# Patient Record
Sex: MALE | Race: WHITE | NOT HISPANIC OR LATINO | Employment: FULL TIME | URBAN - METROPOLITAN AREA
[De-identification: names, ages, dates, MRNs, and addresses within clinical notes are randomized per-mention and may not be internally consistent; named-entity substitution may affect disease eponyms.]

---

## 2023-05-30 LAB
ALANINE AMINOTRANSFERASE (SGPT) (U/L) IN SER/PLAS: 13 U/L (ref 10–52)
ALBUMIN (G/DL) IN SER/PLAS: 4.4 G/DL (ref 3.4–5)
ALKALINE PHOSPHATASE (U/L) IN SER/PLAS: 62 U/L (ref 33–136)
ANION GAP IN SER/PLAS: 12 MMOL/L (ref 10–20)
ASPARTATE AMINOTRANSFERASE (SGOT) (U/L) IN SER/PLAS: 11 U/L (ref 9–39)
BILIRUBIN TOTAL (MG/DL) IN SER/PLAS: 0.7 MG/DL (ref 0–1.2)
CALCIUM (MG/DL) IN SER/PLAS: 9.7 MG/DL (ref 8.6–10.6)
CARBON DIOXIDE, TOTAL (MMOL/L) IN SER/PLAS: 25 MMOL/L (ref 21–32)
CHLORIDE (MMOL/L) IN SER/PLAS: 105 MMOL/L (ref 98–107)
CREATININE (MG/DL) IN SER/PLAS: 0.96 MG/DL (ref 0.5–1.3)
ESTIMATED AVERAGE GLUCOSE FOR HBA1C: 111 MG/DL
GFR MALE: 87 ML/MIN/1.73M2
GLUCOSE (MG/DL) IN SER/PLAS: 83 MG/DL (ref 74–99)
HEMOGLOBIN A1C/HEMOGLOBIN TOTAL IN BLOOD: 5.5 %
POTASSIUM (MMOL/L) IN SER/PLAS: 4.5 MMOL/L (ref 3.5–5.3)
PROTEIN TOTAL: 6.1 G/DL (ref 6.4–8.2)
SODIUM (MMOL/L) IN SER/PLAS: 137 MMOL/L (ref 136–145)
UREA NITROGEN (MG/DL) IN SER/PLAS: 20 MG/DL (ref 6–23)

## 2023-07-25 LAB — PROSTATE SPECIFIC AG (NG/ML) IN SER/PLAS: 0.45 NG/ML (ref 0–4)

## 2023-09-12 ENCOUNTER — HOSPITAL ENCOUNTER (OUTPATIENT)
Dept: DATA CONVERSION | Facility: HOSPITAL | Age: 68
End: 2023-09-12

## 2023-09-12 DIAGNOSIS — M17.12 UNILATERAL PRIMARY OSTEOARTHRITIS, LEFT KNEE: ICD-10-CM

## 2023-10-04 ENCOUNTER — LAB (OUTPATIENT)
Dept: LAB | Facility: LAB | Age: 68
End: 2023-10-04
Payer: MEDICARE

## 2023-10-04 ENCOUNTER — HOSPITAL ENCOUNTER (OUTPATIENT)
Dept: RADIOLOGY | Facility: HOSPITAL | Age: 68
Discharge: HOME | End: 2023-10-04
Payer: MEDICARE

## 2023-10-04 ENCOUNTER — PRE-ADMISSION TESTING (OUTPATIENT)
Dept: PREADMISSION TESTING | Facility: HOSPITAL | Age: 68
End: 2023-10-04
Payer: MEDICARE

## 2023-10-04 ENCOUNTER — DOCUMENTATION (OUTPATIENT)
Dept: ORTHOPEDICS | Facility: HOSPITAL | Age: 68
End: 2023-10-04

## 2023-10-04 VITALS
TEMPERATURE: 97.4 F | OXYGEN SATURATION: 98 % | RESPIRATION RATE: 16 BRPM | SYSTOLIC BLOOD PRESSURE: 126 MMHG | WEIGHT: 189.49 LBS | DIASTOLIC BLOOD PRESSURE: 75 MMHG | HEART RATE: 72 BPM | HEIGHT: 71 IN | BODY MASS INDEX: 26.53 KG/M2

## 2023-10-04 DIAGNOSIS — Z01.818 PREOPERATIVE TESTING: ICD-10-CM

## 2023-10-04 DIAGNOSIS — Z01.818 PREOPERATIVE TESTING: Primary | ICD-10-CM

## 2023-10-04 PROBLEM — R23.9 SKIN CHANGE: Status: ACTIVE | Noted: 2023-10-04

## 2023-10-04 PROBLEM — B02.9 SHINGLES: Status: ACTIVE | Noted: 2023-10-04

## 2023-10-04 PROBLEM — M84.452A: Status: ACTIVE | Noted: 2023-10-04

## 2023-10-04 PROBLEM — Q66.72 CAVUS DEFORMITY OF LEFT FOOT: Status: ACTIVE | Noted: 2023-10-04

## 2023-10-04 PROBLEM — H25.11 AGE-RELATED NUCLEAR CATARACT OF RIGHT EYE: Status: ACTIVE | Noted: 2023-10-04

## 2023-10-04 PROBLEM — M67.952 TENDINOPATHY OF LEFT GLUTEUS MEDIUS: Status: ACTIVE | Noted: 2023-10-04

## 2023-10-04 PROBLEM — N52.9 MALE ERECTILE DISORDER: Status: ACTIVE | Noted: 2023-10-04

## 2023-10-04 PROBLEM — M67.02 ACQUIRED SHORT ACHILLES TENDON OF LEFT LOWER EXTREMITY: Status: ACTIVE | Noted: 2023-10-04

## 2023-10-04 PROBLEM — R71.8 ELEVATED MCV: Status: ACTIVE | Noted: 2023-10-04

## 2023-10-04 PROBLEM — F43.20 ADULT SITUATIONAL STRESS DISORDER: Status: ACTIVE | Noted: 2023-10-04

## 2023-10-04 PROBLEM — M67.951 TENDINOPATHY OF RIGHT GLUTEUS MEDIUS: Status: ACTIVE | Noted: 2023-10-04

## 2023-10-04 PROBLEM — M17.12 PRIMARY LOCALIZED OSTEOARTHRITIS OF LEFT KNEE: Status: ACTIVE | Noted: 2023-10-04

## 2023-10-04 PROBLEM — R79.9 ABNORMAL BLOOD CHEMISTRY: Status: ACTIVE | Noted: 2023-10-04

## 2023-10-04 PROBLEM — H52.03 HYPEROPIA OF BOTH EYES: Status: ACTIVE | Noted: 2023-10-04

## 2023-10-04 PROBLEM — E55.9 VITAMIN D DEFICIENCY: Status: ACTIVE | Noted: 2023-10-04

## 2023-10-04 PROBLEM — M25.851 FEMOROACETABULAR IMPINGEMENT OF BOTH HIPS: Status: ACTIVE | Noted: 2023-10-04

## 2023-10-04 PROBLEM — S83.249A MEDIAL MENISCUS TEAR: Status: ACTIVE | Noted: 2023-10-04

## 2023-10-04 PROBLEM — N40.1 BPH WITH OBSTRUCTION/LOWER URINARY TRACT SYMPTOMS: Status: ACTIVE | Noted: 2023-10-04

## 2023-10-04 PROBLEM — E66.3 OVERWEIGHT WITH BODY MASS INDEX (BMI) OF 28 TO 28.9 IN ADULT: Status: ACTIVE | Noted: 2023-10-04

## 2023-10-04 PROBLEM — N40.0 BPH (BENIGN PROSTATIC HYPERPLASIA): Status: ACTIVE | Noted: 2023-10-04

## 2023-10-04 PROBLEM — G47.9 SLEEP DISTURBANCE: Status: ACTIVE | Noted: 2023-10-04

## 2023-10-04 PROBLEM — H52.03 HYPEROPIA WITH PRESBYOPIA OF BOTH EYES: Status: ACTIVE | Noted: 2023-10-04

## 2023-10-04 PROBLEM — N13.8 BPH WITH OBSTRUCTION/LOWER URINARY TRACT SYMPTOMS: Status: ACTIVE | Noted: 2023-10-04

## 2023-10-04 PROBLEM — R35.1 NOCTURIA: Status: ACTIVE | Noted: 2023-10-04

## 2023-10-04 PROBLEM — H25.12 AGE-RELATED NUCLEAR CATARACT OF LEFT EYE: Status: ACTIVE | Noted: 2023-10-04

## 2023-10-04 PROBLEM — M25.852 FEMOROACETABULAR IMPINGEMENT OF BOTH HIPS: Status: ACTIVE | Noted: 2023-10-04

## 2023-10-04 PROBLEM — R03.0 BLOOD PRESSURE ELEVATED WITHOUT HISTORY OF HTN: Status: ACTIVE | Noted: 2023-10-04

## 2023-10-04 PROBLEM — L30.8 HERPES ZOSTER DERMATITIS: Status: ACTIVE | Noted: 2023-10-04

## 2023-10-04 PROBLEM — R82.90 URINE ABNORMALITY: Status: ACTIVE | Noted: 2023-10-04

## 2023-10-04 PROBLEM — R79.89 LOW TESTOSTERONE: Status: ACTIVE | Noted: 2023-10-04

## 2023-10-04 PROBLEM — H52.4 HYPEROPIA WITH PRESBYOPIA OF BOTH EYES: Status: ACTIVE | Noted: 2023-10-04

## 2023-10-04 PROBLEM — E78.00 ELEVATED LDL CHOLESTEROL LEVEL: Status: ACTIVE | Noted: 2023-10-04

## 2023-10-04 PROBLEM — G47.33 OBSTRUCTIVE SLEEP APNEA SYNDROME: Status: ACTIVE | Noted: 2023-10-04

## 2023-10-04 PROBLEM — B02.8 HERPES ZOSTER DERMATITIS: Status: ACTIVE | Noted: 2023-10-04

## 2023-10-04 PROBLEM — H53.8 BLURRED VISION: Status: ACTIVE | Noted: 2023-10-04

## 2023-10-04 LAB
ANION GAP SERPL CALC-SCNC: 12 MMOL/L
BASOPHILS # BLD AUTO: 0.02 X10*3/UL (ref 0–0.1)
BASOPHILS NFR BLD AUTO: 0.3 %
BUN SERPL-MCNC: 16 MG/DL (ref 8–25)
CALCIUM SERPL-MCNC: 9.3 MG/DL (ref 8.5–10.4)
CHLORIDE SERPL-SCNC: 106 MMOL/L (ref 97–107)
CO2 SERPL-SCNC: 26 MMOL/L (ref 24–31)
CREAT SERPL-MCNC: 0.8 MG/DL (ref 0.4–1.6)
EOSINOPHIL # BLD AUTO: 0.33 X10*3/UL (ref 0–0.7)
EOSINOPHIL NFR BLD AUTO: 5.3 %
ERYTHROCYTE [DISTWIDTH] IN BLOOD BY AUTOMATED COUNT: 13.2 % (ref 11.5–14.5)
GFR SERPL CREATININE-BSD FRML MDRD: >90 ML/MIN/1.73M*2
GLUCOSE SERPL-MCNC: 103 MG/DL (ref 65–99)
HCT VFR BLD AUTO: 41.5 % (ref 41–52)
HGB BLD-MCNC: 13.6 G/DL (ref 13.5–17.5)
IMM GRANULOCYTES # BLD AUTO: 0.01 X10*3/UL (ref 0–0.7)
IMM GRANULOCYTES NFR BLD AUTO: 0.2 % (ref 0–0.9)
LYMPHOCYTES # BLD AUTO: 1.62 X10*3/UL (ref 1.2–4.8)
LYMPHOCYTES NFR BLD AUTO: 25.9 %
MCH RBC QN AUTO: 31 PG (ref 26–34)
MCHC RBC AUTO-ENTMCNC: 32.8 G/DL (ref 32–36)
MCV RBC AUTO: 95 FL (ref 80–100)
MONOCYTES # BLD AUTO: 0.46 X10*3/UL (ref 0.1–1)
MONOCYTES NFR BLD AUTO: 7.4 %
NEUTROPHILS # BLD AUTO: 3.81 X10*3/UL (ref 1.2–7.7)
NEUTROPHILS NFR BLD AUTO: 60.9 %
NRBC BLD-RTO: ABNORMAL /100{WBCS}
PLATELET # BLD AUTO: 176 X10*3/UL (ref 150–450)
PMV BLD AUTO: 10.6 FL (ref 7.5–11.5)
POTASSIUM SERPL-SCNC: 4.5 MMOL/L (ref 3.4–5.1)
RBC # BLD AUTO: 4.39 X10*6/UL (ref 4.5–5.9)
SODIUM SERPL-SCNC: 144 MMOL/L (ref 133–145)
WBC # BLD AUTO: 6.3 X10*3/UL (ref 4.4–11.3)

## 2023-10-04 PROCEDURE — 73562 X-RAY EXAM OF KNEE 3: CPT | Mod: LT

## 2023-10-04 PROCEDURE — 93005 ELECTROCARDIOGRAM TRACING: CPT

## 2023-10-04 PROCEDURE — 87081 CULTURE SCREEN ONLY: CPT

## 2023-10-04 PROCEDURE — 77073 BONE LENGTH STUDIES: CPT | Mod: FY

## 2023-10-04 PROCEDURE — 80048 BASIC METABOLIC PNL TOTAL CA: CPT | Performed by: NURSE PRACTITIONER

## 2023-10-04 PROCEDURE — 85025 COMPLETE CBC W/AUTO DIFF WBC: CPT

## 2023-10-04 PROCEDURE — 36415 COLL VENOUS BLD VENIPUNCTURE: CPT

## 2023-10-04 RX ORDER — BUPROPION HYDROCHLORIDE 100 MG/1
100 TABLET, EXTENDED RELEASE ORAL 2 TIMES DAILY
COMMUNITY
End: 2023-12-21 | Stop reason: SDUPTHER

## 2023-10-04 RX ORDER — TADALAFIL 10 MG/1
1 TABLET ORAL AS NEEDED
COMMUNITY
End: 2023-12-01 | Stop reason: ALTCHOICE

## 2023-10-04 RX ORDER — TRAZODONE HYDROCHLORIDE 50 MG/1
1 TABLET ORAL NIGHTLY
COMMUNITY
End: 2023-12-01 | Stop reason: SDUPTHER

## 2023-10-04 RX ORDER — MELOXICAM 15 MG/1
15 TABLET ORAL DAILY
COMMUNITY
Start: 2023-04-25 | End: 2023-12-01 | Stop reason: ALTCHOICE

## 2023-10-04 RX ORDER — CHLORHEXIDINE GLUCONATE ORAL RINSE 1.2 MG/ML
15 SOLUTION DENTAL AS NEEDED
Qty: 30 ML | Refills: 0 | Status: SHIPPED | OUTPATIENT
Start: 2023-10-22 | End: 2023-12-01 | Stop reason: ALTCHOICE

## 2023-10-04 ASSESSMENT — PAIN DESCRIPTION - DESCRIPTORS: DESCRIPTORS: ACHING;TIGHTNESS

## 2023-10-04 ASSESSMENT — PAIN SCALES - GENERAL: PAINLEVEL_OUTOF10: 1

## 2023-10-04 ASSESSMENT — PAIN - FUNCTIONAL ASSESSMENT: PAIN_FUNCTIONAL_ASSESSMENT: 0-10

## 2023-10-04 NOTE — PROGRESS NOTES
Patient attended Total Joint Replacement class today in preparation for their upcoming surgery.  Topics discussed include:    [id number]  Program Information  Consent to Enroll    Background/Understanding of Joint Replacement Surgery  Potential for same day discharge  Any questions or concerns to be directed to the surgeon's office    How to Prepare for Surgery  Clearance for Surgery  Medical Clearance by Specialists  Dental Clearance  The importance of post-op antibiotics for dental visits per surgeon protocol  Preadmission Testing  **Potential for postponed surgery if appropriate clearance is not obtained  Tips for Preparing the home for discharge from the hospital  Care Partner  Requirement for surgery, the patient must have a plan to have help at home  Potential for postponed surgery if plan for home support cannot be established  How the care partner can help after surgery    What to Expect in the Hospital/At Home  Morning of Surgery NPO Guidelines  Nothing to eat after midnight  Water can be consumed up to 2 hours prior to arrival  Surgical and Post-Surgical Care Team  Surgical Team  Anesthesia Team  Nursing  Physical Therapy  Care Coordinating  Pharmacy  Hospital Arrival Instructions  Arrive at the time provided to you  Consider traffic patterns (rush-hour) based on arrival time  Have arrangements made for a ride home  If discharging same day, care partner should remain at the hospital  Recovering after Surgery  Recovery Room - Visitors are not brought back  Transition to hospital room - 2nd Floor, Visitors will be directed to your room  The presence of and strategies for controlling surgical pain and swelling  The importance of early mobility  Side effects after surgery  What to expect if staying overnight    Discharge Planning  The intended plan for discharge will be for patients to discharge home  All patients require a care partner (family, friend, neighbor, etc.) to stay with the patient for 3-5 nights  after surgery  Home Care Services set up per surgeon order  Physical Therapy  Occupational Therapy  **If desired, private duty care can be arranged by the patient ahead of time**  Outpatient Physical Therapy per surgeon order    Recovering at Home  Wound Care  Follow wound care instructions found in your discharge paperwork  Bandage is waterproof and you may shower with the bandage  Do not scrub directly over the bandage    Post-Op Risk Prevention  Infection Prevention  Promptly seek treatment for any infections post-operatively  Routine dental visits must be postponed for 3 months after surgery  Your surgeon may require antibiotics prior to future dental visits  Any concerns for infection not related directly to the knee or the hip should be managed by your primary care provider  Blood Clots  Be sure to complete the course of blood thinning medication as prescribed by your surgeon  Movement every 1-2 hours during the day is encouraged to prevent blood clots  Monitor for signs of blood clots  Wear compression stockings as prescribed by your surgeon  Constipation  Constipation is common following surgery  Drink plenty of fluids  Take stool softener/laxative as prescribed by your surgeon  Move around frequently  Eat foods high in fiber    Durable Medical Equipment  Cold Therapy  Breg Cold Therapy Machines  Ice/Gel Packs  Assistive Devices  Folding Walker with Wheels (in the front only)  No Rollators  Crutches if approved by Physical Therapy and Surgeon after surgery  Hip Kits  Raised Toilet Seats  Additional Compression Stockings    Joint Preservation  Healthy Activities when Cleared  Walking  Swimming  Bike Riding  Activities to Avoid  Refrain from repetitive motions which have a high impact on the joint  Gradual Progression  Progress activity slowly, listen to your body  Common Findings - NORMAL after surgery  Clicking/Grinding  Numbness near incision    Physical Therapy  Prehabilitation exercises  START TODAY ON  BOTH LEGS  Surgery Specific Precautions  Follow precautions based on your discharge paperwork    For questions regarding class - please send a message to your care team via [facility]

## 2023-10-04 NOTE — CPM/PAT H&P
CPM/PAT Evaluation           Mohit Villatoro is a 67 y.o. male   Chief Complaint: knee pain    HPI:  Patient is a 67 y/o alert and oriented male coming in for PAT for a scheduled left total knee arthroplasty on 10/23/23 w/ Dr. Abdul.  The patient reports dull achy knee pain with prolonged sitting or standing.  His knee does not swell and does not give out. He has tried cortisone injections as well as physical therapy w/o improvement in his discomfort.  Patient denies chest pain, SOB, MAYERS and NVDC.  Patient also denies Hx: DVT/PE.  Current medications were reviewed and a presurgical mediation schedule was provided.  He has no questions at this time.     Past Medical History:   Diagnosis Date    Abnormal finding of blood chemistry, unspecified 11/30/2022    Abnormal blood chemistry    Adjustment disorder, unspecified 12/17/2021    Adult situational stress disorder    Allergic rhinitis, unspecified 09/01/2017    Allergic rhinitis    Benign prostatic hyperplasia without lower urinary tract symptoms 12/16/2022    BPH (benign prostatic hyperplasia)    Encounter for general adult medical examination without abnormal findings 11/30/2022    Welcome to Medicare preventive visit    Encounter for screening for malignant neoplasm of prostate 10/12/2022    Screening for prostate cancer    Nocturia 09/01/2017    Nocturia    Other abnormality of red blood cells 10/12/2022    Elevated MCV    Pain in right knee 01/05/2023    Bilateral knee pain    Poor urinary stream 12/16/2022    Weak urine stream    Pure hypercholesterolemia, unspecified 10/12/2022    Elevated LDL cholesterol level    Sleep disorder, unspecified 04/09/2021    Sleep disturbance    Unspecified abnormal findings in urine 09/10/2020    Urine abnormality    Unspecified skin changes 04/26/2021    Skin change      Past Surgical History:   Procedure Laterality Date    CYSTOSCOPY      OTHER SURGICAL HISTORY  11/27/2018    Hernia repair   Holmium laser of prostate on  4/14/2023    No Known Allergies     Current Outpatient Medications on File Prior to Visit   Medication Sig Dispense Refill    buPROPion SR (Wellbutrin SR) 100 mg 12 hr tablet Take 1 tablet (100 mg) by mouth 2 times a day. Do not crush, chew, or split.      traZODone (Desyrel) 50 mg tablet Take 1 tablet (50 mg) by mouth once daily at bedtime.       No current facility-administered medications on file prior to visit.       Review of Systems   Musculoskeletal:         Dull achy knee pain.  Denies any swelling or giving out of his knee   All other systems reviewed and are negative.     Physical Exam  Vitals and nursing note reviewed.   Constitutional:       Appearance: Normal appearance.   HENT:      Head: Normocephalic and atraumatic.   Eyes:      Pupils: Pupils are equal, round, and reactive to light.   Cardiovascular:      Rate and Rhythm: Normal rate and regular rhythm.      Pulses: Normal pulses.      Heart sounds: Normal heart sounds.      Comments: EKG today shows NSR rate of 71  Pulmonary:      Effort: Pulmonary effort is normal.      Breath sounds: Normal breath sounds.   Abdominal:      General: Bowel sounds are normal.   Musculoskeletal:         General: Normal range of motion.   Skin:     General: Skin is warm and dry.   Neurological:      General: No focal deficit present.      Mental Status: He is alert and oriented to person, place, and time.   Psychiatric:         Mood and Affect: Mood normal.         Behavior: Behavior normal.         Thought Content: Thought content normal.         Judgment: Judgment normal.              Airway  Vitals:    10/04/23 1304   BP: 126/75   Pulse: 72   Resp: 16   Temp: 36.3 °C (97.4 °F)   SpO2: 98%     Stop Bang Score      Assessment and Plan:   Advanced Left Knee Osteoarthritis  Left total knee arthroplasty    ASA II  RCRI - 0 points  Class I Risk 3.9%  BRIAN - points Risk for BRUCE - care notes provided  NSQIP - Predicted length of stay days  ARISCAT - 3 points Low Risk  1.6%  DASI 42.7 Points 7.99 Mets  YAYA - 0.1%  JHFRAT - points risk for falls  Clearance - not indicated  PAT Testing - CBC, CMP, PT/PTT, UA, T&S, MRSA PCR, EKG    CHLORHEXIDINE .12% DENTAL RINSE E PRESCIRBED PER  INFECTION PREVENTION PROTOCOL. PATIENT EDUCATED   Kayce Crystal, APRN-CNP

## 2023-10-04 NOTE — PREPROCEDURE INSTRUCTIONS
Medication List            Accurate as of October 4, 2023  1:33 PM. Always use your most recent med list.                buPROPion  mg 12 hr tablet  Commonly known as: Wellbutrin SR  Medication Adjustments for Surgery: Take morning of surgery with sip of water, no other fluids     traZODone 50 mg tablet  Commonly known as: Desyrel  Medication Adjustments for Surgery: Continue until night before surgery                              NPO Instructions:    Do not eat any food after midnight the night before your surgery/procedure.    Additional Instructions:     Seven/Six Days before Surgery:  Review your medication instructions, stop indicated medications  Five Days before Surgery:  Review your medication instructions, stop indicated medications  Three Days before Surgery:  Review your medication instructions, stop indicated medications  The Day before Surgery:  Review your medication instructions, stop indicated medications  You will be contacted regarding the time of your arrival to facility and surgery time  Do not eat any food after Midnight  Day of Surgery:  Review your medication instructions, take indicated medications  Wear  comfortable loose fitting clothing  Do not use moisturizers, creams, lotions or perfume  All jewelry and valuables should be left at home

## 2023-10-05 ENCOUNTER — APPOINTMENT (OUTPATIENT)
Dept: PHYSICAL THERAPY | Facility: CLINIC | Age: 68
End: 2023-10-05
Payer: MEDICARE

## 2023-10-06 LAB — STAPHYLOCOCCUS SPEC CULT: ABNORMAL

## 2023-10-11 ENCOUNTER — TELEPHONE (OUTPATIENT)
Dept: ORTHOPEDICS | Facility: HOSPITAL | Age: 68
End: 2023-10-11
Payer: MEDICARE

## 2023-10-11 NOTE — TELEPHONE ENCOUNTER
Patient called to confirm appointment and discuss RTW clearance and driving after surgery.  Instructed patient to call post-op when feeling like he can complete his daily work requirements and feels comfortable to drive.  At that time the patient and I will have a follow-up conversation to discuss.  Patient verbalized understanding of conversation and to call with any other questions or concerns.

## 2023-10-12 ENCOUNTER — APPOINTMENT (OUTPATIENT)
Dept: PHYSICAL THERAPY | Facility: CLINIC | Age: 68
End: 2023-10-12
Payer: MEDICARE

## 2023-10-17 ENCOUNTER — PROCEDURE VISIT (OUTPATIENT)
Dept: UROLOGY | Facility: CLINIC | Age: 68
End: 2023-10-17
Payer: MEDICARE

## 2023-10-17 ENCOUNTER — TELEPHONE (OUTPATIENT)
Dept: ORTHOPEDICS | Facility: HOSPITAL | Age: 68
End: 2023-10-17

## 2023-10-17 ENCOUNTER — ANESTHESIA EVENT (OUTPATIENT)
Dept: OPERATING ROOM | Facility: HOSPITAL | Age: 68
End: 2023-10-17
Payer: MEDICARE

## 2023-10-17 VITALS
SYSTOLIC BLOOD PRESSURE: 115 MMHG | DIASTOLIC BLOOD PRESSURE: 76 MMHG | BODY MASS INDEX: 26.74 KG/M2 | HEIGHT: 71 IN | WEIGHT: 191 LBS | HEART RATE: 79 BPM

## 2023-10-17 DIAGNOSIS — N40.1 BPH WITH OBSTRUCTION/LOWER URINARY TRACT SYMPTOMS: Primary | ICD-10-CM

## 2023-10-17 DIAGNOSIS — N13.8 BPH WITH OBSTRUCTION/LOWER URINARY TRACT SYMPTOMS: Primary | ICD-10-CM

## 2023-10-17 DIAGNOSIS — N32.0 BLADDER NECK CONTRACTURE: ICD-10-CM

## 2023-10-17 LAB
POC APPEARANCE, URINE: CLEAR
POC BILIRUBIN, URINE: NEGATIVE
POC BLOOD, URINE: NEGATIVE
POC COLOR, URINE: YELLOW
POC GLUCOSE, URINE: NEGATIVE MG/DL
POC KETONES, URINE: NEGATIVE MG/DL
POC LEUKOCYTES, URINE: NEGATIVE
POC NITRITE,URINE: NEGATIVE
POC PH, URINE: 5.5 PH
POC PROTEIN, URINE: NEGATIVE MG/DL
POC SPECIFIC GRAVITY, URINE: >=1.03
POC UROBILINOGEN, URINE: 0.2 EU/DL

## 2023-10-17 PROCEDURE — 81003 URINALYSIS AUTO W/O SCOPE: CPT | Performed by: UROLOGY

## 2023-10-17 PROCEDURE — 52000 CYSTOURETHROSCOPY: CPT | Performed by: UROLOGY

## 2023-10-17 PROCEDURE — 99214 OFFICE O/P EST MOD 30 MIN: CPT | Performed by: UROLOGY

## 2023-10-17 NOTE — PROGRESS NOTES
Subjective   Mohit Villatoro is a 67 y.o. male presenting today for cystoscopy.   HPI:     Patient is a 67 year old male with history of BPH with LUTs s/p HoLEP on 04/13/2023 presenting today for cystoscopy to r/o [facility]. Patient reports that since his procedure he has had decreased urinary stream.         Past Medical History:   Diagnosis Date    Abnormal finding of blood chemistry, unspecified 11/30/2022    Abnormal blood chemistry    Adjustment disorder, unspecified 12/17/2021    Adult situational stress disorder    Allergic rhinitis, unspecified 09/01/2017    Allergic rhinitis    Benign prostatic hyperplasia without lower urinary tract symptoms 12/16/2022    BPH (benign prostatic hyperplasia)    Encounter for general adult medical examination without abnormal findings 11/30/2022    Welcome to Medicare preventive visit    Encounter for screening for malignant neoplasm of prostate 10/12/2022    Screening for prostate cancer    Nocturia 09/01/2017    Nocturia    Other abnormality of red blood cells 10/12/2022    Elevated MCV    Pain in right knee 01/05/2023    Bilateral knee pain    Poor urinary stream 12/16/2022    Weak urine stream    Pure hypercholesterolemia, unspecified 10/12/2022    Elevated LDL cholesterol level    Sleep disorder, unspecified 04/09/2021    Sleep disturbance    Unspecified abnormal findings in urine 09/10/2020    Urine abnormality    Unspecified skin changes 04/26/2021    Skin change     Past Surgical History:   Procedure Laterality Date    LASER OF PROSTATE W/ GREEN LIGHT PVP      Holmium laser of prostate(not green light)    OTHER SURGICAL HISTORY  11/27/2018    Hernia repair     Family History   Problem Relation Name Age of Onset    Hypertension Mother      Diabetes Sister      Other (cardiac disorder) Other      Other (allergy) Other      Hypertension Other       Current Outpatient Medications   Medication Sig Dispense Refill    buPROPion SR (Wellbutrin SR) 100 mg 12 hr tablet Take 1 tablet  (100 mg) by mouth 2 times a day. Do not crush, chew, or split.      traZODone (Desyrel) 50 mg tablet Take 1 tablet (50 mg) by mouth once daily at bedtime.      [START ON 10/22/2023] chlorhexidine (Peridex) 0.12 % solution Use 15 mL in the mouth or throat if needed for wound care for up to 2 doses. Do not start before October 22, 2023. (Patient not taking: Reported on 10/17/2023 Do not start before October 22, 2023.) 30 mL 0    meloxicam (Mobic) 15 mg tablet Take 1 tablet (15 mg) by mouth once daily. Take with food.      tadalafil (Cialis) 10 mg tablet Take 1 tablet (10 mg) by mouth if needed.       No current facility-administered medications for this visit.     Allergies   Allergen Reactions    Fluoxetine Hcl Unknown     sex dysfunction     Social History     Socioeconomic History    Marital status:      Spouse name: Not on file    Number of children: Not on file    Years of education: Not on file    Highest education level: Not on file   Occupational History    Not on file   Tobacco Use    Smoking status: Former     Packs/day: 0.50     Years: 20.00     Additional pack years: 0.00     Total pack years: 10.00     Types: Cigarettes    Smokeless tobacco: Never    Tobacco comments:     Quit smoking cigarettes 1997, smoked 0.5 ppd x 20 yrs   Vaping Use    Vaping Use: Never used   Substance and Sexual Activity    Alcohol use: Yes     Alcohol/week: 4.0 standard drinks of alcohol     Types: 4 Glasses of wine per week     Comment: occasionally    Drug use: Never    Sexual activity: Not on file   Other Topics Concern    Not on file   Social History Narrative    Not on file     Social Determinants of Health     Financial Resource Strain: Not on file   Food Insecurity: Not on file   Transportation Needs: Not on file   Physical Activity: Not on file   Stress: Not on file   Social Connections: Not on file   Intimate Partner Violence: Not on file   Housing Stability: Not on file       PROCEDURE NOTE:    PREOPERATIVE  "DIAGNOSIS:  BPH     POSTOPERATIVE DIAGNOSIS:  Same    OPERATION:  Flexible Cystourethroscopy          ANESTHESIA:  2%  lidocaine jelly    COMPLICATIONS:  None    EBL: Minimal    DISPOSITION:  The patient was discharged home after the procedure, per routine.    INDICATIONS: :  Mr. Villatoro is a 67 y.o. patient with a history of BPH who presents today for Cystoscopy.     The indications, risks and benefits of this procedure were discussed with the patient, consent was obtained prior to the procedure, and to the best of my judgement the patient seemed to understand and agree to the procedure.    PROCEDURE:  The patient  was brought into the procedure suite and informed consent was reviewed and confirmed. Vital signs were obtained prior to the procedure: /76 (BP Location: Right arm, Patient Position: Sitting, BP Cuff Size: Small adult)   Pulse 79   Ht 1.803 m (5' 11\")   Wt 86.6 kg (191 lb)   BMI 26.64 kg/m² .  The patient was escorted onto the stretcher, placed supine, prepped with betadine and draped in the usual standard surgical fashion.  Intraurethral 2% viscous lidocaine jelly was used for local analgesia.  A 16 Pashto flexible cystourethroscope was inserted into the urethra.       Bladder: Unable to negotiate scope in the bladder      IMPRESSION:    I was unable to negotiate the scope through the bladder, patient has bladder neck contracture.     PLAN:    Will proceed with bladder neck incision.     Objective     Lab Review  Lab Results   Component Value Date    WBC 6.3 10/04/2023    RBC 4.39 (L) 10/04/2023    HGB 13.6 10/04/2023    HCT 41.5 10/04/2023     10/04/2023      Lab Results   Component Value Date    BUN 16 10/04/2023    CREATININE 0.80 10/04/2023      Lab Results   Component Value Date    PSA 0.45 07/25/2023         Assessment/Plan   Diagnoses and all orders for this visit:  BPH with obstruction/lower urinary tract symptoms  -     Cystourethroscopy; Future  -     POCT UA Automated " manually resulted  Bladder neck contracture  -     Case Request Operating Room: Incision Transurethral Bladder Neck; Standing  Other orders  -     Weigh patient; Standing  -     Measure height and length; Standing  -     Place in outpatient/hospital ambulatory surgery; Standing  -     Full code; Standing  -     ceFAZolin (Ancef) 2 g in dextrose 5 % in water (D5W) 50 mL IV      Bladder neck contracture s/p HoLEP on 4/13/2023     Patient has decreased urinary stream due to BNC. We will proceed with bladder neck incision.     Discussed with patient. Risks, benefits, and alternatives were discussed. Patient desires to proceed.    All questions were answered to the patient's satisfaction. Patient agrees with the plan and wishes to proceed. Follow-up will be scheduled appropriately.     I spent 30 minutes of dedicated E&M time, including preparation and review of records, notes, and data, time spent with patient/family, and documentation.     I spent  30 minutes of dedicated E&M time, including preparation and review of records, notes, and data, time spent with patient/family, and documentation.     Scribed for Dr. Leon by Dena Benitez. I , Dr Leon, have personally reviewed and agreed with the information entered by the Virtual Scribe.

## 2023-10-17 NOTE — H&P (VIEW-ONLY)
Subjective   Mohit Villatoro is a 67 y.o. male presenting today for cystoscopy.   HPI:     Patient is a 67 year old male with history of BPH with LUTs s/p HoLEP on 04/13/2023 presenting today for cystoscopy to r/o [facility]. Patient reports that since his procedure he has had decreased urinary stream.         Past Medical History:   Diagnosis Date    Abnormal finding of blood chemistry, unspecified 11/30/2022    Abnormal blood chemistry    Adjustment disorder, unspecified 12/17/2021    Adult situational stress disorder    Allergic rhinitis, unspecified 09/01/2017    Allergic rhinitis    Benign prostatic hyperplasia without lower urinary tract symptoms 12/16/2022    BPH (benign prostatic hyperplasia)    Encounter for general adult medical examination without abnormal findings 11/30/2022    Welcome to Medicare preventive visit    Encounter for screening for malignant neoplasm of prostate 10/12/2022    Screening for prostate cancer    Nocturia 09/01/2017    Nocturia    Other abnormality of red blood cells 10/12/2022    Elevated MCV    Pain in right knee 01/05/2023    Bilateral knee pain    Poor urinary stream 12/16/2022    Weak urine stream    Pure hypercholesterolemia, unspecified 10/12/2022    Elevated LDL cholesterol level    Sleep disorder, unspecified 04/09/2021    Sleep disturbance    Unspecified abnormal findings in urine 09/10/2020    Urine abnormality    Unspecified skin changes 04/26/2021    Skin change     Past Surgical History:   Procedure Laterality Date    LASER OF PROSTATE W/ GREEN LIGHT PVP      Holmium laser of prostate(not green light)    OTHER SURGICAL HISTORY  11/27/2018    Hernia repair     Family History   Problem Relation Name Age of Onset    Hypertension Mother      Diabetes Sister      Other (cardiac disorder) Other      Other (allergy) Other      Hypertension Other       Current Outpatient Medications   Medication Sig Dispense Refill    buPROPion SR (Wellbutrin SR) 100 mg 12 hr tablet Take 1 tablet  (100 mg) by mouth 2 times a day. Do not crush, chew, or split.      traZODone (Desyrel) 50 mg tablet Take 1 tablet (50 mg) by mouth once daily at bedtime.      [START ON 10/22/2023] chlorhexidine (Peridex) 0.12 % solution Use 15 mL in the mouth or throat if needed for wound care for up to 2 doses. Do not start before October 22, 2023. (Patient not taking: Reported on 10/17/2023 Do not start before October 22, 2023.) 30 mL 0    meloxicam (Mobic) 15 mg tablet Take 1 tablet (15 mg) by mouth once daily. Take with food.      tadalafil (Cialis) 10 mg tablet Take 1 tablet (10 mg) by mouth if needed.       No current facility-administered medications for this visit.     Allergies   Allergen Reactions    Fluoxetine Hcl Unknown     sex dysfunction     Social History     Socioeconomic History    Marital status:      Spouse name: Not on file    Number of children: Not on file    Years of education: Not on file    Highest education level: Not on file   Occupational History    Not on file   Tobacco Use    Smoking status: Former     Packs/day: 0.50     Years: 20.00     Additional pack years: 0.00     Total pack years: 10.00     Types: Cigarettes    Smokeless tobacco: Never    Tobacco comments:     Quit smoking cigarettes 1997, smoked 0.5 ppd x 20 yrs   Vaping Use    Vaping Use: Never used   Substance and Sexual Activity    Alcohol use: Yes     Alcohol/week: 4.0 standard drinks of alcohol     Types: 4 Glasses of wine per week     Comment: occasionally    Drug use: Never    Sexual activity: Not on file   Other Topics Concern    Not on file   Social History Narrative    Not on file     Social Determinants of Health     Financial Resource Strain: Not on file   Food Insecurity: Not on file   Transportation Needs: Not on file   Physical Activity: Not on file   Stress: Not on file   Social Connections: Not on file   Intimate Partner Violence: Not on file   Housing Stability: Not on file       PROCEDURE NOTE:    PREOPERATIVE  "DIAGNOSIS:  BPH     POSTOPERATIVE DIAGNOSIS:  Same    OPERATION:  Flexible Cystourethroscopy          ANESTHESIA:  2%  lidocaine jelly    COMPLICATIONS:  None    EBL: Minimal    DISPOSITION:  The patient was discharged home after the procedure, per routine.    INDICATIONS: :  Mr. Villatoro is a 67 y.o. patient with a history of BPH who presents today for Cystoscopy.     The indications, risks and benefits of this procedure were discussed with the patient, consent was obtained prior to the procedure, and to the best of my judgement the patient seemed to understand and agree to the procedure.    PROCEDURE:  The patient  was brought into the procedure suite and informed consent was reviewed and confirmed. Vital signs were obtained prior to the procedure: /76 (BP Location: Right arm, Patient Position: Sitting, BP Cuff Size: Small adult)   Pulse 79   Ht 1.803 m (5' 11\")   Wt 86.6 kg (191 lb)   BMI 26.64 kg/m² .  The patient was escorted onto the stretcher, placed supine, prepped with betadine and draped in the usual standard surgical fashion.  Intraurethral 2% viscous lidocaine jelly was used for local analgesia.  A 16 Azeri flexible cystourethroscope was inserted into the urethra.       Bladder: Unable to negotiate scope in the bladder      IMPRESSION:    I was unable to negotiate the scope through the bladder, patient has bladder neck contracture.     PLAN:    Will proceed with bladder neck incision.     Objective     Lab Review  Lab Results   Component Value Date    WBC 6.3 10/04/2023    RBC 4.39 (L) 10/04/2023    HGB 13.6 10/04/2023    HCT 41.5 10/04/2023     10/04/2023      Lab Results   Component Value Date    BUN 16 10/04/2023    CREATININE 0.80 10/04/2023      Lab Results   Component Value Date    PSA 0.45 07/25/2023         Assessment/Plan   Diagnoses and all orders for this visit:  BPH with obstruction/lower urinary tract symptoms  -     Cystourethroscopy; Future  -     POCT UA Automated " manually resulted  Bladder neck contracture  -     Case Request Operating Room: Incision Transurethral Bladder Neck; Standing  Other orders  -     Weigh patient; Standing  -     Measure height and length; Standing  -     Place in outpatient/hospital ambulatory surgery; Standing  -     Full code; Standing  -     ceFAZolin (Ancef) 2 g in dextrose 5 % in water (D5W) 50 mL IV      Bladder neck contracture s/p HoLEP on 4/13/2023     Patient has decreased urinary stream due to BNC. We will proceed with bladder neck incision.     Discussed with patient. Risks, benefits, and alternatives were discussed. Patient desires to proceed.    All questions were answered to the patient's satisfaction. Patient agrees with the plan and wishes to proceed. Follow-up will be scheduled appropriately.     I spent 30 minutes of dedicated E&M time, including preparation and review of records, notes, and data, time spent with patient/family, and documentation.     I spent  30 minutes of dedicated E&M time, including preparation and review of records, notes, and data, time spent with patient/family, and documentation.     Scribed for Dr. Leon by Dena Benitez. I , Dr Leon, have personally reviewed and agreed with the information entered by the Virtual Scribe.

## 2023-10-17 NOTE — TELEPHONE ENCOUNTER
Thank you for calling today.  All questions were answered at the time of the call, but please feel free to reach out to me via [email or web address] or phone, [phone], with any new questions or concerns.       We confirmed that you opted to enroll in our [id number] program so your discharge prescriptions will be available to take home at the time of discharge.       We confirmed that your plan would be to Discharge Home same day (Rapid Recovery).     Use the provided body wash for 4 days before surgery and complete a 5th shower on the morning of surgery, this includes your body and hair.  Follow the directions as provided during preadmission testing.  The mouth wash will be used the night before and the morning of surgery.       As a reminder, if you do not hear from our team, please call [phone] between 2pm and 3pm the business day before your surgery to confirm your arrival time and details.

## 2023-10-19 ENCOUNTER — HOSPITAL ENCOUNTER (OUTPATIENT)
Facility: HOSPITAL | Age: 68
Setting detail: OUTPATIENT SURGERY
Discharge: HOME | End: 2023-10-19
Attending: UROLOGY | Admitting: UROLOGY
Payer: MEDICARE

## 2023-10-19 ENCOUNTER — ANESTHESIA (OUTPATIENT)
Dept: OPERATING ROOM | Facility: HOSPITAL | Age: 68
End: 2023-10-19
Payer: MEDICARE

## 2023-10-19 VITALS
RESPIRATION RATE: 16 BRPM | SYSTOLIC BLOOD PRESSURE: 116 MMHG | WEIGHT: 189.6 LBS | BODY MASS INDEX: 26.54 KG/M2 | OXYGEN SATURATION: 99 % | HEIGHT: 71 IN | HEART RATE: 66 BPM | TEMPERATURE: 97.7 F | DIASTOLIC BLOOD PRESSURE: 69 MMHG

## 2023-10-19 DIAGNOSIS — N13.8 BPH WITH OBSTRUCTION/LOWER URINARY TRACT SYMPTOMS: Primary | ICD-10-CM

## 2023-10-19 DIAGNOSIS — N32.0 BLADDER NECK CONTRACTURE: ICD-10-CM

## 2023-10-19 DIAGNOSIS — N40.1 BPH WITH OBSTRUCTION/LOWER URINARY TRACT SYMPTOMS: Primary | ICD-10-CM

## 2023-10-19 PROCEDURE — 2500000004 HC RX 250 GENERAL PHARMACY W/ HCPCS (ALT 636 FOR OP/ED): Performed by: NURSE ANESTHETIST, CERTIFIED REGISTERED

## 2023-10-19 PROCEDURE — 3600000008 HC OR TIME - EACH INCREMENTAL 1 MINUTE - PROCEDURE LEVEL THREE: Performed by: UROLOGY

## 2023-10-19 PROCEDURE — 7100000001 HC RECOVERY ROOM TIME - INITIAL BASE CHARGE: Performed by: UROLOGY

## 2023-10-19 PROCEDURE — 2720000007 HC OR 272 NO HCPCS: Performed by: UROLOGY

## 2023-10-19 PROCEDURE — 3700000001 HC GENERAL ANESTHESIA TIME - INITIAL BASE CHARGE: Performed by: UROLOGY

## 2023-10-19 PROCEDURE — A52400 PR CYSTOSCOPY,RESEC CONGEN MUCOSAL FOLDS: Performed by: ANESTHESIOLOGY

## 2023-10-19 PROCEDURE — 52640 RELIEVE BLADDER CONTRACTURE: CPT | Performed by: UROLOGY

## 2023-10-19 PROCEDURE — 2500000004 HC RX 250 GENERAL PHARMACY W/ HCPCS (ALT 636 FOR OP/ED): Performed by: ANESTHESIOLOGIST ASSISTANT

## 2023-10-19 PROCEDURE — 3700000002 HC GENERAL ANESTHESIA TIME - EACH INCREMENTAL 1 MINUTE: Performed by: UROLOGY

## 2023-10-19 PROCEDURE — 7100000010 HC PHASE TWO TIME - EACH INCREMENTAL 1 MINUTE: Performed by: UROLOGY

## 2023-10-19 PROCEDURE — 2500000005 HC RX 250 GENERAL PHARMACY W/O HCPCS: Performed by: NURSE ANESTHETIST, CERTIFIED REGISTERED

## 2023-10-19 PROCEDURE — 2500000004 HC RX 250 GENERAL PHARMACY W/ HCPCS (ALT 636 FOR OP/ED): Performed by: UROLOGY

## 2023-10-19 PROCEDURE — 7100000009 HC PHASE TWO TIME - INITIAL BASE CHARGE: Performed by: UROLOGY

## 2023-10-19 PROCEDURE — 7100000002 HC RECOVERY ROOM TIME - EACH INCREMENTAL 1 MINUTE: Performed by: UROLOGY

## 2023-10-19 PROCEDURE — 3600000003 HC OR TIME - INITIAL BASE CHARGE - PROCEDURE LEVEL THREE: Performed by: UROLOGY

## 2023-10-19 PROCEDURE — A52400 PR CYSTOSCOPY,RESEC CONGEN MUCOSAL FOLDS: Performed by: NURSE ANESTHETIST, CERTIFIED REGISTERED

## 2023-10-19 RX ORDER — ONDANSETRON HYDROCHLORIDE 2 MG/ML
INJECTION, SOLUTION INTRAVENOUS AS NEEDED
Status: DISCONTINUED | OUTPATIENT
Start: 2023-10-19 | End: 2023-10-19

## 2023-10-19 RX ORDER — DEXAMETHASONE SODIUM PHOSPHATE 4 MG/ML
INJECTION, SOLUTION INTRA-ARTICULAR; INTRALESIONAL; INTRAMUSCULAR; INTRAVENOUS; SOFT TISSUE AS NEEDED
Status: DISCONTINUED | OUTPATIENT
Start: 2023-10-19 | End: 2023-10-19

## 2023-10-19 RX ORDER — PROPOFOL 10 MG/ML
INJECTION, EMULSION INTRAVENOUS AS NEEDED
Status: DISCONTINUED | OUTPATIENT
Start: 2023-10-19 | End: 2023-10-19

## 2023-10-19 RX ORDER — SODIUM CHLORIDE, SODIUM LACTATE, POTASSIUM CHLORIDE, CALCIUM CHLORIDE 600; 310; 30; 20 MG/100ML; MG/100ML; MG/100ML; MG/100ML
INJECTION, SOLUTION INTRAVENOUS CONTINUOUS PRN
Status: DISCONTINUED | OUTPATIENT
Start: 2023-10-19 | End: 2023-10-19

## 2023-10-19 RX ORDER — SODIUM CHLORIDE, SODIUM LACTATE, POTASSIUM CHLORIDE, CALCIUM CHLORIDE 600; 310; 30; 20 MG/100ML; MG/100ML; MG/100ML; MG/100ML
100 INJECTION, SOLUTION INTRAVENOUS CONTINUOUS
Status: DISCONTINUED | OUTPATIENT
Start: 2023-10-19 | End: 2023-10-19 | Stop reason: HOSPADM

## 2023-10-19 RX ORDER — LIDOCAINE HYDROCHLORIDE 10 MG/ML
0.1 INJECTION, SOLUTION EPIDURAL; INFILTRATION; INTRACAUDAL; PERINEURAL ONCE
Status: DISCONTINUED | OUTPATIENT
Start: 2023-10-19 | End: 2023-10-19 | Stop reason: HOSPADM

## 2023-10-19 RX ORDER — ONDANSETRON HYDROCHLORIDE 2 MG/ML
4 INJECTION, SOLUTION INTRAVENOUS ONCE AS NEEDED
Status: DISCONTINUED | OUTPATIENT
Start: 2023-10-19 | End: 2023-10-19 | Stop reason: HOSPADM

## 2023-10-19 RX ORDER — LABETALOL HYDROCHLORIDE 5 MG/ML
5 INJECTION, SOLUTION INTRAVENOUS ONCE AS NEEDED
Status: DISCONTINUED | OUTPATIENT
Start: 2023-10-19 | End: 2023-10-19 | Stop reason: HOSPADM

## 2023-10-19 RX ORDER — MIDAZOLAM HYDROCHLORIDE 1 MG/ML
INJECTION, SOLUTION INTRAMUSCULAR; INTRAVENOUS AS NEEDED
Status: DISCONTINUED | OUTPATIENT
Start: 2023-10-19 | End: 2023-10-19

## 2023-10-19 RX ORDER — LIDOCAINE HYDROCHLORIDE 10 MG/ML
INJECTION, SOLUTION EPIDURAL; INFILTRATION; INTRACAUDAL; PERINEURAL AS NEEDED
Status: DISCONTINUED | OUTPATIENT
Start: 2023-10-19 | End: 2023-10-19

## 2023-10-19 RX ORDER — FENTANYL CITRATE 50 UG/ML
INJECTION, SOLUTION INTRAMUSCULAR; INTRAVENOUS AS NEEDED
Status: DISCONTINUED | OUTPATIENT
Start: 2023-10-19 | End: 2023-10-19

## 2023-10-19 RX ORDER — CEFAZOLIN SODIUM 2 G/100ML
2 INJECTION, SOLUTION INTRAVENOUS ONCE
Status: COMPLETED | OUTPATIENT
Start: 2023-10-19 | End: 2023-10-19

## 2023-10-19 RX ADMIN — FENTANYL CITRATE 50 MCG: 50 INJECTION INTRAMUSCULAR; INTRAVENOUS at 12:48

## 2023-10-19 RX ADMIN — LIDOCAINE HYDROCHLORIDE 30 ML: 10 INJECTION, SOLUTION EPIDURAL; INFILTRATION; INTRACAUDAL; PERINEURAL at 12:40

## 2023-10-19 RX ADMIN — SODIUM CHLORIDE, POTASSIUM CHLORIDE, SODIUM LACTATE AND CALCIUM CHLORIDE: 600; 310; 30; 20 INJECTION, SOLUTION INTRAVENOUS at 12:22

## 2023-10-19 RX ADMIN — CEFAZOLIN SODIUM 2 G: 2 INJECTION, SOLUTION INTRAVENOUS at 12:40

## 2023-10-19 RX ADMIN — PROPOFOL 200 MG: 10 INJECTION, EMULSION INTRAVENOUS at 12:40

## 2023-10-19 RX ADMIN — FENTANYL CITRATE 25 MCG: 50 INJECTION INTRAMUSCULAR; INTRAVENOUS at 12:50

## 2023-10-19 RX ADMIN — ONDANSETRON 4 MG: 2 INJECTION, SOLUTION INTRAMUSCULAR; INTRAVENOUS at 12:51

## 2023-10-19 RX ADMIN — DEXAMETHASONE SODIUM PHOSPHATE 4 MG: 4 INJECTION, SOLUTION INTRAMUSCULAR; INTRAVENOUS at 12:51

## 2023-10-19 RX ADMIN — FENTANYL CITRATE 25 MCG: 50 INJECTION INTRAMUSCULAR; INTRAVENOUS at 12:49

## 2023-10-19 RX ADMIN — MIDAZOLAM 2 MG: 1 INJECTION INTRAMUSCULAR; INTRAVENOUS at 12:40

## 2023-10-19 SDOH — HEALTH STABILITY: MENTAL HEALTH: CURRENT SMOKER: 0

## 2023-10-19 ASSESSMENT — PAIN SCALES - GENERAL
PAINLEVEL_OUTOF10: 0 - NO PAIN
PAIN_LEVEL: 3

## 2023-10-19 ASSESSMENT — PAIN - FUNCTIONAL ASSESSMENT
PAIN_FUNCTIONAL_ASSESSMENT: 0-10
PAIN_FUNCTIONAL_ASSESSMENT: WONG-BAKER FACES
PAIN_FUNCTIONAL_ASSESSMENT: 0-10
PAIN_FUNCTIONAL_ASSESSMENT: 0-10

## 2023-10-19 NOTE — PERIOPERATIVE NURSING NOTE
Patient in Phase 2; dressed and up to chair with RN assist. Tolerating po fluids, no complaint of pain and no complaint of nausea.     Family at bedside; discussed discharge instructions with patient and Family. All questions at this time answered.     Patient clinically appropriate for discharge. IV removed and patient transported to discharge area via wheelchair.     Educated patient and son how to remove and care for gutierrez catheter.   Supplies given to patient.

## 2023-10-19 NOTE — ANESTHESIA PROCEDURE NOTES
Airway  Date/Time: 10/19/2023 12:45 PM  Urgency: elective    Airway not difficult    Staffing  Performed: CRNA   Authorized by: Ugo Anand MD    Performed by: JN Galvan-ANSELMO  Patient location during procedure: OR    Indications and Patient Condition  Indications for airway management: anesthesia and airway protection  Spontaneous ventilation: present  Preoxygenated: yes  Patient position: sniffing      Final Airway Details  Final airway type: supraglottic airway      Successful airway: classic  Size 4             DATE OF ADMISSION:  2018

 

DATE OF DISCHARGE:  2018

 

DIAGNOSES:

1.  A 27 to 28 weeks gestation.

2.  Chronic hypertension superimposed gestational hypertension.

 

SUMMARY OF HOSPITAL COURSE:  Ms. Chan is a 26-year-old  female G1, P0 who is 27 to 2
8 weeks gestation by first trimester confirmatory ultrasound who by history reports some previous katie
vated blood pressures while not pregnant in the past while giving plasma at the blood center.  She aust[place]
d initial blood pressure reading 130 over high 80s and 90 at one time, but had been normotensive unti
l recently.  She had initially been assessed for this elevation of blood pressure 150/96 on last week
 and was brought into the hospital with serial blood pressures, labs which were all normalized and 
e patient was discharged home with recommendation for blood pressure checks at home.  She began using
 her mother's blood pressure cuff and had elevated reading in the 160s-170s over 1 teens and came to 
labor and delivery.  When she arrived in L&D, her blood pressure was in the 150-160 over 90s and felt
 that it was technique related due to the fact that the patient does have some morbid obesity and the
 blood pressure cuff, which she was using was not fitting her arm properly.  She was started on radia
l blood pressure checks, which were in the 130s to 140s over mid 80s and the patient had no symptoms.
  Her serial lab assessment with platelets, liver enzymes and creatinine were all normal.  Spot urine
 protein was normal.  She now completed a 24-hour urine protein, which was negative.  She was initiat
ed with Procardia 30 mg XL daily and her blood pressures have remained normotensive in the 1-teens to
 120s over 60-70 range.  She remains asymptomatic and has active fetus.  Biophysical profile most rec
ently on the patient was reactive .  She has had normal interval growth ultrasound in the past wee
k and the fetus is known to have a right club foot, which she has been evaluated by Maternal Fetal [facility]
dicine Service.

 

Plan is to discharge home the patient on Procardia 30 mg XL daily.  She has completed betamethasone a
ntenatal steroids in case she has needed to deliver .  She will have a follow up twice a week 
in my office in 2 days and then Monday, Thursday regimen with weekly lab assessment and interval grow
th ultrasounds every 3 weeks.  If she develops increasing blood pressure on the current blood pressur
e medication or any change in her labs, then we would assess this is worsening in her hypertension an
d worsening preeclampsia.

## 2023-10-19 NOTE — ANESTHESIA POSTPROCEDURE EVALUATION
Patient: Mohit Villatoro    Procedure Summary       Date: 10/19/23 Room / Location: [facility] / Virtual [facility]    Anesthesia Start: 1238 Anesthesia Stop: 1316    Procedure: Incision Transurethral Bladder Neck Diagnosis:       Bladder neck contracture      (Bladder neck contracture [N32.0])    Surgeons: Muna Leon MD Responsible Provider: Ugo Anand MD    Anesthesia Type: general ASA Status: 2            Anesthesia Type: general    Vitals Value Taken Time   BP    10/19/23 1326   Temp  10/19/23 1326   Pulse  10/19/23 1326   Resp  10/19/23 1326   SpO2  10/19/23 1326       Anesthesia Post Evaluation    Patient location during evaluation: PACU  Level of consciousness: awake  Pain score: 3  Pain management: adequate  Airway patency: patent  Cardiovascular status: acceptable  Respiratory status: acceptable  Hydration status: acceptable        There were no known notable events for this encounter.

## 2023-10-19 NOTE — ANESTHESIA PREPROCEDURE EVALUATION
Patient: Mohit Villatoro    Procedure Information       Date/Time: 10/19/23 1245    Procedure: Incision Transurethral Bladder Neck    Location: [facility] / [facility]    Surgeons: Muna Leon MD            Relevant Problems   Neuro/Psych   (+) Adult situational stress disorder      Pulmonary   (+) Obstructive sleep apnea syndrome      Musculoskeletal   (+) Primary localized osteoarthritis of left knee      Infectious Disease   (+) Herpes zoster dermatitis   (+) Shingles     Past Medical History:   Diagnosis Date    Abnormal finding of blood chemistry, unspecified 11/30/2022    Abnormal blood chemistry    Adjustment disorder, unspecified 12/17/2021    Adult situational stress disorder    Allergic rhinitis, unspecified 09/01/2017    Allergic rhinitis    Benign prostatic hyperplasia without lower urinary tract symptoms 12/16/2022    BPH (benign prostatic hyperplasia)    Encounter for general adult medical examination without abnormal findings 11/30/2022    Welcome to Medicare preventive visit    Encounter for screening for malignant neoplasm of prostate 10/12/2022    Screening for prostate cancer    Nocturia 09/01/2017    Nocturia    Other abnormality of red blood cells 10/12/2022    Elevated MCV    Pain in right knee 01/05/2023    Bilateral knee pain    Poor urinary stream 12/16/2022    Weak urine stream    Pure hypercholesterolemia, unspecified 10/12/2022    Elevated LDL cholesterol level    Sleep disorder, unspecified 04/09/2021    Sleep disturbance    Unspecified abnormal findings in urine 09/10/2020    Urine abnormality    Unspecified skin changes 04/26/2021    Skin change      Past Surgical History:   Procedure Laterality Date    LASER OF PROSTATE W/ GREEN LIGHT PVP      Holmium laser of prostate(not green light)    OTHER SURGICAL HISTORY  11/27/2018    Hernia repair        Clinical information reviewed:   Tobacco  Allergies  Meds   Med Hx  Surg Hx   Fam Hx  Soc Hx        NPO Detail:  NPO/Void  Status  Date of Last Liquid: 10/18/23  Time of Last Liquid: 1800  Date of Last Solid: 10/18/23  Time of Last Solid: 1800  Time of Last Void: 1145         Physical Exam    Airway  Mallampati: IV  TM distance: >3 FB  Neck ROM: full     Cardiovascular    Dental    Pulmonary    Abdominal            Anesthesia Plan    ASA 2     general     The patient is not a current smoker.  Patient was not previously instructed to abstain from smoking on day of procedure.  Patient did not smoke on day of procedure.    intravenous induction   Anesthetic plan and risks discussed with patient.    Plan discussed with attending.

## 2023-10-19 NOTE — PERIOPERATIVE NURSING NOTE
Pt awake, reoriented to time and place. Pt denies pain. Marcus cont to drain clear.  Pt sat up, tolerating PO intake.

## 2023-10-19 NOTE — OP NOTE
Incision Transurethral Bladder Neck Operative Note     Date: 10/19/2023  OR Location: [facility]    Name: Mohit Villatoro, : 1955, Age: 67 y.o., MRN: [id number], Sex: male    Diagnosis  Pre-op Diagnosis     * Bladder neck contracture [N32.0] Post-op Diagnosis     * Bladder neck contracture [N32.0]     Procedures  Incision Transurethral Bladder Neck  63179 - AR TRURL RESCJ POSTOP BLADDER NECK CONTRACTURE      Surgeons      * Muna Leon - Primary    Resident/Fellow/Other Assistant:  Surgeon(s) and Role:    Procedure Summary  Anesthesia: General  ASA: II  Anesthesia Staff: Anesthesiologist: Ugo Anand MD  CRNA: JN Galvan-CRNA  C-AA: PEDRITO Abraham  Estimated Blood Loss: 0mL  Intra-op Medications: * No intraprocedure medications in log *           Anesthesia Record               Intraprocedure I/O Totals          Intake    lactated Ringer's infusion 600.00 mL    ceFAZolin in dextrose (iso-os) (Ancef) IVPB 2 g 100.00 mL    Total Intake 700 mL          Specimen: No specimens collected     Staff:   Circulator: Mayelin Ward RN; Tianna Ramirez RN; Julissa Cabrales RN  Scrub Person: Yadira Rojas         Drains and/or Catheters:   Urethral Catheter 18 Fr. (Active)   Site Assessment Clean;Skin intact 10/19/23 1307   Collection Container Standard drainage bag 10/19/23 1307   Securement Method Securing device (Describe) 10/19/23 1317   Reason for Continuing Urinary Catheterization surgical procedures: urological/gynecological, pelvic oncology, anal, prolonged surgical procedure 10/19/23 1317       Tourniquet Times:         Implants:     Findings: tight BNC    Indications: Mohit Villatoro is an 67 y.o. male who is having surgery for Bladder neck contracture [N32.0].     The patient was seen in the preoperative area. The risks, benefits, complications, treatment options, non-operative alternatives, expected recovery and outcomes were discussed with the patient. The possibilities of reaction to  medication, pulmonary aspiration, injury to surrounding structures, bleeding, recurrent infection, the need for additional procedures, failure to diagnose a condition, and creating a complication requiring transfusion or operation were discussed with the patient. The patient concurred with the proposed plan, giving informed consent.  The site of surgery was properly noted/marked if necessary per policy. The patient has been actively warmed in preoperative area. Preoperative antibiotics have been ordered and given within 1 hours of incision. Venous thrombosis prophylaxis have been ordered including bilateral sequential compression devices    Procedure Details: PREOPERATIVE DIAGNOSIS: Bladder neck contracture after outlet procedure    POSTOPERATIVE DIAGNOSIS: same    OPERATION/PROCEDURE:  Incsion of bladder neck contracture with Holmium laser    SURGEON: Muna Leon MD.      ANESTHESIA: General.    ESTIMATED BLOOD LOSS: Minimal.    CATHETERS: 20-Danish Coude Marcus catheter.        CLINICAL HISTORY:    The patient is a 67-year-old male who presented with significant obstructive voiding symptoms.  He underwent Holmium laser encucleation of  prostate previously.  He subsequently presented with recurrent urinary symptoms and was found to have bladder neck contracture with no residual adenoma.  Therefore, he opted to undergo bladder neck incision with holmium laser.   Risks and alternatives of procedure were discussed in detail.  He signed informed consent and agreed to proceed.    OPERATIVE REPORT:    The patient was brought to the operating room, placed on the operating table in supine position.  After induction of general anesthesia and administration of preoperative intravenous  antibiotics, he was repositioned in dorsal lithotomy position.  His perineal and genital region was prepped and draped in usual sterile fashion. Continuous flow laser resectoscope was introduced and pancystoscopy was performed.  Tight bladder  neck contracture was encountered.  This was incised widely using Holmium laser.  The remainder of cystoscopy was then performed.  This revealed a trabeculated bladder with both ureteral orifices in normal anatomic position. There was no residual obstruction. Upon the completion of the procedure, the  channel appeared to be widely capacious and no significant residual adenoma was remaining.  Both ureteral orifices were unharmed and the sphincter appeared to be intact.  Therefore, the resectoscope was withdrawn, and a 20-Zimbabwean coude Marcus catheter was inserted without any difficulty.  The patient was awoken from anesthesia and transferred to PACU in stable condition.    DISPOSITION:    The patient will have a trial of void in 24 hours.   Complications:  None; patient tolerated the procedure well.    Disposition: PACU - hemodynamically stable.  Condition: stable         Additional Details: na    Attending Attestation: I was present and scrubbed for the entire procedure.    Muna Leon  Phone Number: [phone]

## 2023-10-19 NOTE — PROGRESS NOTES
Subjective   Patient ID: Mohit Villatoro is a 67 y.o. male presenting for TOV s/p Transurethral Bladder Neck Incision with Dr. Leon 10/19/2023    HPI  Bladder neck contracture s/p HoLEP on 4/13/2023   Hx decreased urinary stream due to BNC.   He has been doing well since surgery. Urine has become clear, yellow without evidence of gross hematuria or clots. He denies any fevers or chills.    Review of Systems  All other systems have been reviewed and are negative for complaint.    Objective   Physical Exam  General: Well developed, well nourished, alert and cooperative, appears in no acute distress  Eyes: Non-injected conjunctiva, sclera clear, no proptosis  Cardiac: Extremities are warm and well perfused. No edema, cyanosis or pallor.   Lungs: Breathing is easy, non-labored. Speaking in clear and complete sentences. Normal diaphragmatic movement.  MSK: Ambulatory with steady gait, unassisted  Neuro: alert and oriented to person, place and time  Psych: Demonstrates good judgement and reason, without hallucinations, abnormal affect or abnormal behaviors.  Skin: no obvious lesions, no rashes.  :     Assessment/Plan   Diagnoses and all orders for this visit:  Bladder neck contracture      We discussed postoperative urinary retention in great detail. We discussed that different medications, including anesthesia can influence urinary retention. We discussed that postoperative constipation can also contribute to urinary retention. We discussed management of urinary retention to include indwelling catheter or CIC. We discussed risks of unmanaged urinary retention including irreversible kidney injury and increased risk of UTI. We discussed TOV today.    [] TOV today  [] Drink plenty of fluids to maintain hydration and clear urine output  [] You may have some irritative voiding symptoms over the next few days: burning, frequency, urgency  [] Activity restrictions reviewed    He will return to clinic as previously  scheduled for post-operative visit with Dr. Leon, or sooner if needed.    All questions and concerns were addressed. Patient verbalizes understanding and has no other questions at this time.     If you have any questions about your care, do not hesitate to call and leave a message, we return calls in a timely manner.    Trini Lzi-- STEFANO RAGSDALE  Office Phone:  [phone]

## 2023-10-20 ENCOUNTER — APPOINTMENT (OUTPATIENT)
Dept: UROLOGY | Facility: HOSPITAL | Age: 68
End: 2023-10-20
Payer: MEDICARE

## 2023-10-20 ENCOUNTER — ANESTHESIA EVENT (OUTPATIENT)
Dept: OPERATING ROOM | Facility: HOSPITAL | Age: 68
End: 2023-10-20
Payer: MEDICARE

## 2023-10-23 ENCOUNTER — ANESTHESIA (OUTPATIENT)
Dept: OPERATING ROOM | Facility: HOSPITAL | Age: 68
End: 2023-10-23
Payer: MEDICARE

## 2023-10-23 ENCOUNTER — PHARMACY VISIT (OUTPATIENT)
Dept: PHARMACY | Facility: CLINIC | Age: 68
End: 2023-10-23

## 2023-10-23 ENCOUNTER — HOSPITAL ENCOUNTER (OUTPATIENT)
Facility: HOSPITAL | Age: 68
Setting detail: SURGERY ADMIT
Discharge: HOME | End: 2023-10-23
Attending: ORTHOPAEDIC SURGERY | Admitting: ORTHOPAEDIC SURGERY
Payer: MEDICARE

## 2023-10-23 ENCOUNTER — HOME HEALTH ADMISSION (OUTPATIENT)
Dept: HOME HEALTH SERVICES | Facility: HOME HEALTH | Age: 68
End: 2023-10-23
Payer: MEDICARE

## 2023-10-23 ENCOUNTER — HOSPITAL ENCOUNTER (OUTPATIENT)
Dept: DATA CONVERSION | Facility: HOSPITAL | Age: 68
Discharge: HOME | End: 2023-10-23

## 2023-10-23 VITALS
OXYGEN SATURATION: 96 % | HEART RATE: 71 BPM | SYSTOLIC BLOOD PRESSURE: 126 MMHG | BODY MASS INDEX: 26.73 KG/M2 | RESPIRATION RATE: 18 BRPM | HEIGHT: 71 IN | DIASTOLIC BLOOD PRESSURE: 78 MMHG | TEMPERATURE: 97.5 F | WEIGHT: 190.92 LBS

## 2023-10-23 DIAGNOSIS — M17.12 UNILATERAL PRIMARY OSTEOARTHRITIS, LEFT KNEE: ICD-10-CM

## 2023-10-23 DIAGNOSIS — M17.12 PRIMARY LOCALIZED OSTEOARTHRITIS OF LEFT KNEE: Primary | ICD-10-CM

## 2023-10-23 DIAGNOSIS — M17.12 PRIMARY LOCALIZED OSTEOARTHRITIS OF LEFT KNEE: ICD-10-CM

## 2023-10-23 DIAGNOSIS — M17.12 UNILATERAL PRIMARY OSTEOARTHRITIS, LEFT KNEE: Primary | ICD-10-CM

## 2023-10-23 DIAGNOSIS — G89.18 ACUTE POST-OPERATIVE PAIN: ICD-10-CM

## 2023-10-23 PROCEDURE — 97110 THERAPEUTIC EXERCISES: CPT | Mod: GP

## 2023-10-23 PROCEDURE — 2500000004 HC RX 250 GENERAL PHARMACY W/ HCPCS (ALT 636 FOR OP/ED): Performed by: ANESTHESIOLOGY

## 2023-10-23 PROCEDURE — 2500000001 HC RX 250 WO HCPCS SELF ADMINISTERED DRUGS (ALT 637 FOR MEDICARE OP): Performed by: PHYSICIAN ASSISTANT

## 2023-10-23 PROCEDURE — 97530 THERAPEUTIC ACTIVITIES: CPT | Mod: GP

## 2023-10-23 PROCEDURE — 2720000007 HC OR 272 NO HCPCS: Performed by: ORTHOPAEDIC SURGERY

## 2023-10-23 PROCEDURE — 7100000010 HC PHASE TWO TIME - EACH INCREMENTAL 1 MINUTE: Performed by: ORTHOPAEDIC SURGERY

## 2023-10-23 PROCEDURE — 3700000002 HC GENERAL ANESTHESIA TIME - EACH INCREMENTAL 1 MINUTE: Performed by: ORTHOPAEDIC SURGERY

## 2023-10-23 PROCEDURE — 2500000004 HC RX 250 GENERAL PHARMACY W/ HCPCS (ALT 636 FOR OP/ED): Performed by: PHYSICIAN ASSISTANT

## 2023-10-23 PROCEDURE — 7100000001 HC RECOVERY ROOM TIME - INITIAL BASE CHARGE: Performed by: ORTHOPAEDIC SURGERY

## 2023-10-23 PROCEDURE — 2500000005 HC RX 250 GENERAL PHARMACY W/O HCPCS: Performed by: ORTHOPAEDIC SURGERY

## 2023-10-23 PROCEDURE — 97116 GAIT TRAINING THERAPY: CPT | Mod: GP

## 2023-10-23 PROCEDURE — 97161 PT EVAL LOW COMPLEX 20 MIN: CPT | Mod: GP

## 2023-10-23 PROCEDURE — 7100000009 HC PHASE TWO TIME - INITIAL BASE CHARGE: Performed by: ORTHOPAEDIC SURGERY

## 2023-10-23 PROCEDURE — RXMED WILLOW AMBULATORY MEDICATION CHARGE

## 2023-10-23 PROCEDURE — C1776 JOINT DEVICE (IMPLANTABLE): HCPCS | Performed by: ORTHOPAEDIC SURGERY

## 2023-10-23 PROCEDURE — 3700000001 HC GENERAL ANESTHESIA TIME - INITIAL BASE CHARGE: Performed by: ORTHOPAEDIC SURGERY

## 2023-10-23 PROCEDURE — 7100000002 HC RECOVERY ROOM TIME - EACH INCREMENTAL 1 MINUTE: Performed by: ORTHOPAEDIC SURGERY

## 2023-10-23 PROCEDURE — 2500000005 HC RX 250 GENERAL PHARMACY W/O HCPCS: Performed by: NURSE ANESTHETIST, CERTIFIED REGISTERED

## 2023-10-23 PROCEDURE — A27447 PR TOTAL KNEE ARTHROPLASTY: Performed by: NURSE ANESTHETIST, CERTIFIED REGISTERED

## 2023-10-23 PROCEDURE — 3600000018 HC OR TIME - INITIAL BASE CHARGE - PROCEDURE LEVEL SIX: Performed by: ORTHOPAEDIC SURGERY

## 2023-10-23 PROCEDURE — A27447 PR TOTAL KNEE ARTHROPLASTY: Performed by: ANESTHESIOLOGY

## 2023-10-23 PROCEDURE — 27447 TOTAL KNEE ARTHROPLASTY: CPT | Performed by: ORTHOPAEDIC SURGERY

## 2023-10-23 PROCEDURE — 2500000004 HC RX 250 GENERAL PHARMACY W/ HCPCS (ALT 636 FOR OP/ED): Performed by: NURSE ANESTHETIST, CERTIFIED REGISTERED

## 2023-10-23 PROCEDURE — 2500000005 HC RX 250 GENERAL PHARMACY W/O HCPCS: Performed by: PHYSICIAN ASSISTANT

## 2023-10-23 PROCEDURE — 3600000017 HC OR TIME - EACH INCREMENTAL 1 MINUTE - PROCEDURE LEVEL SIX: Performed by: ORTHOPAEDIC SURGERY

## 2023-10-23 PROCEDURE — 2780000003 HC OR 278 NO HCPCS: Performed by: ORTHOPAEDIC SURGERY

## 2023-10-23 PROCEDURE — 2500000001 HC RX 250 WO HCPCS SELF ADMINISTERED DRUGS (ALT 637 FOR MEDICARE OP): Performed by: STUDENT IN AN ORGANIZED HEALTH CARE EDUCATION/TRAINING PROGRAM

## 2023-10-23 PROCEDURE — 64447 NJX AA&/STRD FEMORAL NRV IMG: CPT | Performed by: ANESTHESIOLOGY

## 2023-10-23 DEVICE — ATTUNE KNEE SYSTEM FEMORAL POSTERIOR STABILIZED SIZE 6 LEFT CEMENTED
Type: IMPLANTABLE DEVICE | Site: KNEE | Status: FUNCTIONAL
Brand: ATTUNE

## 2023-10-23 DEVICE — ATTUNE KNEE SYSTEM TIBIAL INSERT FIXED BEARING POSTERIOR STABILIZED 6 6MM AOX
Type: IMPLANTABLE DEVICE | Site: KNEE | Status: FUNCTIONAL
Brand: ATTUNE

## 2023-10-23 DEVICE — SMARTSET HV HIGH VISCOSITY BONE CEMENT 40G
Type: IMPLANTABLE DEVICE | Site: KNEE | Status: FUNCTIONAL
Brand: SMARTSET

## 2023-10-23 DEVICE — ATTUNE PATELLA MEDIALIZED DOME 38MM CEMENTED AOX
Type: IMPLANTABLE DEVICE | Site: KNEE | Status: FUNCTIONAL
Brand: ATTUNE

## 2023-10-23 RX ORDER — HYDROMORPHONE HYDROCHLORIDE 1 MG/ML
1 INJECTION, SOLUTION INTRAMUSCULAR; INTRAVENOUS; SUBCUTANEOUS EVERY 2 HOUR PRN
Status: DISCONTINUED | OUTPATIENT
Start: 2023-10-23 | End: 2023-10-23 | Stop reason: HOSPADM

## 2023-10-23 RX ORDER — ALBUTEROL SULFATE 0.83 MG/ML
2.5 SOLUTION RESPIRATORY (INHALATION) EVERY 30 MIN PRN
Status: DISCONTINUED | OUTPATIENT
Start: 2023-10-23 | End: 2023-10-23 | Stop reason: HOSPADM

## 2023-10-23 RX ORDER — CEFAZOLIN SODIUM 2 G/100ML
2 INJECTION, SOLUTION INTRAVENOUS ONCE
Status: DISCONTINUED | OUTPATIENT
Start: 2023-10-23 | End: 2023-10-23

## 2023-10-23 RX ORDER — ROPIVACAINE/EPI/CLONIDINE/KET 2.46-0.005
SYRINGE (ML) INJECTION AS NEEDED
Status: DISCONTINUED | OUTPATIENT
Start: 2023-10-23 | End: 2023-10-23 | Stop reason: HOSPADM

## 2023-10-23 RX ORDER — OXYCODONE HYDROCHLORIDE 5 MG/1
10 TABLET ORAL EVERY 4 HOURS PRN
Status: DISCONTINUED | OUTPATIENT
Start: 2023-10-23 | End: 2023-10-23 | Stop reason: HOSPADM

## 2023-10-23 RX ORDER — MELOXICAM 7.5 MG/1
7.5 TABLET ORAL ONCE
Status: COMPLETED | OUTPATIENT
Start: 2023-10-23 | End: 2023-10-23

## 2023-10-23 RX ORDER — FENTANYL CITRATE 50 UG/ML
100 INJECTION, SOLUTION INTRAMUSCULAR; INTRAVENOUS ONCE
Status: COMPLETED | OUTPATIENT
Start: 2023-10-23 | End: 2023-10-23

## 2023-10-23 RX ORDER — FENTANYL CITRATE 50 UG/ML
INJECTION, SOLUTION INTRAMUSCULAR; INTRAVENOUS AS NEEDED
Status: DISCONTINUED | OUTPATIENT
Start: 2023-10-23 | End: 2023-10-23

## 2023-10-23 RX ORDER — KETOROLAC TROMETHAMINE 15 MG/ML
15 INJECTION, SOLUTION INTRAMUSCULAR; INTRAVENOUS ONCE
Status: COMPLETED | OUTPATIENT
Start: 2023-10-23 | End: 2023-10-23

## 2023-10-23 RX ORDER — MIDAZOLAM HYDROCHLORIDE 1 MG/ML
2 INJECTION, SOLUTION INTRAMUSCULAR; INTRAVENOUS ONCE
Status: COMPLETED | OUTPATIENT
Start: 2023-10-23 | End: 2023-10-23

## 2023-10-23 RX ORDER — LIDOCAINE 560 MG/1
1 PATCH PERCUTANEOUS; TOPICAL; TRANSDERMAL DAILY
Status: DISCONTINUED | OUTPATIENT
Start: 2023-10-23 | End: 2023-10-23 | Stop reason: HOSPADM

## 2023-10-23 RX ORDER — ACETAMINOPHEN 325 MG/1
650 TABLET ORAL EVERY 4 HOURS PRN
Status: DISCONTINUED | OUTPATIENT
Start: 2023-10-23 | End: 2023-10-23 | Stop reason: HOSPADM

## 2023-10-23 RX ORDER — PANTOPRAZOLE SODIUM 40 MG/1
40 TABLET, DELAYED RELEASE ORAL
Qty: 30 TABLET | Refills: 0 | Status: SHIPPED | OUTPATIENT
Start: 2023-10-23 | End: 2023-12-01 | Stop reason: ALTCHOICE

## 2023-10-23 RX ORDER — OXYCODONE HYDROCHLORIDE 5 MG/1
5 TABLET ORAL EVERY 4 HOURS PRN
Status: DISCONTINUED | OUTPATIENT
Start: 2023-10-23 | End: 2023-10-23 | Stop reason: HOSPADM

## 2023-10-23 RX ORDER — ONDANSETRON HYDROCHLORIDE 2 MG/ML
4 INJECTION, SOLUTION INTRAVENOUS ONCE AS NEEDED
Status: DISCONTINUED | OUTPATIENT
Start: 2023-10-23 | End: 2023-10-23 | Stop reason: HOSPADM

## 2023-10-23 RX ORDER — ALUMINUM HYDROXIDE, MAGNESIUM HYDROXIDE, AND SIMETHICONE 1200; 120; 1200 MG/30ML; MG/30ML; MG/30ML
10 SUSPENSION ORAL ONCE AS NEEDED
Status: DISCONTINUED | OUTPATIENT
Start: 2023-10-23 | End: 2023-10-23 | Stop reason: HOSPADM

## 2023-10-23 RX ORDER — CEFAZOLIN SODIUM 2 G/100ML
2 INJECTION, SOLUTION INTRAVENOUS EVERY 8 HOURS
Status: DISCONTINUED | OUTPATIENT
Start: 2023-10-23 | End: 2023-10-23 | Stop reason: HOSPADM

## 2023-10-23 RX ORDER — TRAMADOL HYDROCHLORIDE 50 MG/1
50 TABLET ORAL EVERY 6 HOURS PRN
Qty: 28 TABLET | Refills: 0 | Status: SHIPPED | OUTPATIENT
Start: 2023-10-23 | End: 2023-10-30 | Stop reason: SDUPTHER

## 2023-10-23 RX ORDER — CYCLOBENZAPRINE HCL 10 MG
10 TABLET ORAL ONCE AS NEEDED
Status: DISCONTINUED | OUTPATIENT
Start: 2023-10-23 | End: 2023-10-23 | Stop reason: HOSPADM

## 2023-10-23 RX ORDER — ACETAMINOPHEN 10 MG/ML
1000 INJECTION, SOLUTION INTRAVENOUS ONCE AS NEEDED
Status: DISCONTINUED | OUTPATIENT
Start: 2023-10-23 | End: 2023-10-23

## 2023-10-23 RX ORDER — PROPOFOL 10 MG/ML
INJECTION, EMULSION INTRAVENOUS CONTINUOUS PRN
Status: DISCONTINUED | OUTPATIENT
Start: 2023-10-23 | End: 2023-10-23

## 2023-10-23 RX ORDER — LIDOCAINE 560 MG/1
1 PATCH PERCUTANEOUS; TOPICAL; TRANSDERMAL DAILY
Status: CANCELLED | OUTPATIENT
Start: 2023-10-24

## 2023-10-23 RX ORDER — FENTANYL CITRATE 50 UG/ML
50 INJECTION, SOLUTION INTRAMUSCULAR; INTRAVENOUS EVERY 5 MIN PRN
Status: DISCONTINUED | OUTPATIENT
Start: 2023-10-23 | End: 2023-10-23 | Stop reason: HOSPADM

## 2023-10-23 RX ORDER — ACETAMINOPHEN 325 MG/1
975 TABLET ORAL ONCE
Status: COMPLETED | OUTPATIENT
Start: 2023-10-23 | End: 2023-10-23

## 2023-10-23 RX ORDER — NALOXONE HYDROCHLORIDE 0.4 MG/ML
0.2 INJECTION, SOLUTION INTRAMUSCULAR; INTRAVENOUS; SUBCUTANEOUS EVERY 5 MIN PRN
Status: DISCONTINUED | OUTPATIENT
Start: 2023-10-23 | End: 2023-10-23 | Stop reason: HOSPADM

## 2023-10-23 RX ORDER — MIDAZOLAM HYDROCHLORIDE 1 MG/ML
INJECTION, SOLUTION INTRAMUSCULAR; INTRAVENOUS AS NEEDED
Status: DISCONTINUED | OUTPATIENT
Start: 2023-10-23 | End: 2023-10-23

## 2023-10-23 RX ORDER — OXYCODONE HYDROCHLORIDE 5 MG/1
10 TABLET ORAL ONCE
Status: COMPLETED | OUTPATIENT
Start: 2023-10-23 | End: 2023-10-23

## 2023-10-23 RX ORDER — PREGABALIN 75 MG/1
75 CAPSULE ORAL ONCE
Status: COMPLETED | OUTPATIENT
Start: 2023-10-23 | End: 2023-10-23

## 2023-10-23 RX ORDER — CEFAZOLIN SODIUM 2 G/100ML
INJECTION, SOLUTION INTRAVENOUS AS NEEDED
Status: DISCONTINUED | OUTPATIENT
Start: 2023-10-23 | End: 2023-10-23

## 2023-10-23 RX ORDER — ASPIRIN 81 MG/1
81 TABLET ORAL 2 TIMES DAILY
Qty: 60 TABLET | Refills: 0 | Status: SHIPPED | OUTPATIENT
Start: 2023-10-23 | End: 2023-11-22

## 2023-10-23 RX ORDER — OXYCODONE HYDROCHLORIDE 5 MG/1
5 TABLET ORAL EVERY 6 HOURS PRN
Qty: 28 TABLET | Refills: 0 | Status: SHIPPED | OUTPATIENT
Start: 2023-10-23 | End: 2023-10-30 | Stop reason: SDUPTHER

## 2023-10-23 RX ORDER — IPRATROPIUM BROMIDE 0.5 MG/2.5ML
500 SOLUTION RESPIRATORY (INHALATION) EVERY 30 MIN PRN
Status: DISCONTINUED | OUTPATIENT
Start: 2023-10-23 | End: 2023-10-23 | Stop reason: HOSPADM

## 2023-10-23 RX ORDER — MELOXICAM 15 MG/1
15 TABLET ORAL DAILY
Qty: 30 TABLET | Refills: 0 | Status: SHIPPED | OUTPATIENT
Start: 2023-10-23 | End: 2023-12-01 | Stop reason: ALTCHOICE

## 2023-10-23 RX ORDER — SODIUM CHLORIDE, SODIUM LACTATE, POTASSIUM CHLORIDE, CALCIUM CHLORIDE 600; 310; 30; 20 MG/100ML; MG/100ML; MG/100ML; MG/100ML
40 INJECTION, SOLUTION INTRAVENOUS CONTINUOUS
Status: DISCONTINUED | OUTPATIENT
Start: 2023-10-23 | End: 2023-10-23

## 2023-10-23 RX ORDER — TRANEXAMIC ACID 100 MG/ML
INJECTION, SOLUTION INTRAVENOUS AS NEEDED
Status: DISCONTINUED | OUTPATIENT
Start: 2023-10-23 | End: 2023-10-23

## 2023-10-23 RX ORDER — ONDANSETRON HYDROCHLORIDE 2 MG/ML
INJECTION, SOLUTION INTRAVENOUS AS NEEDED
Status: DISCONTINUED | OUTPATIENT
Start: 2023-10-23 | End: 2023-10-23

## 2023-10-23 RX ORDER — ACETAMINOPHEN 500 MG
500 TABLET ORAL EVERY 6 HOURS
Qty: 120 TABLET | Refills: 0 | Status: SHIPPED | OUTPATIENT
Start: 2023-10-23 | End: 2023-11-22

## 2023-10-23 RX ORDER — DEXAMETHASONE SODIUM PHOSPHATE 4 MG/ML
INJECTION, SOLUTION INTRA-ARTICULAR; INTRALESIONAL; INTRAMUSCULAR; INTRAVENOUS; SOFT TISSUE AS NEEDED
Status: DISCONTINUED | OUTPATIENT
Start: 2023-10-23 | End: 2023-10-23

## 2023-10-23 RX ORDER — LABETALOL HYDROCHLORIDE 5 MG/ML
5 INJECTION, SOLUTION INTRAVENOUS EVERY 5 MIN PRN
Status: DISCONTINUED | OUTPATIENT
Start: 2023-10-23 | End: 2023-10-23 | Stop reason: HOSPADM

## 2023-10-23 RX ORDER — SODIUM CHLORIDE, SODIUM LACTATE, POTASSIUM CHLORIDE, CALCIUM CHLORIDE 600; 310; 30; 20 MG/100ML; MG/100ML; MG/100ML; MG/100ML
40 INJECTION, SOLUTION INTRAVENOUS CONTINUOUS
Status: DISCONTINUED | OUTPATIENT
Start: 2023-10-23 | End: 2023-10-23 | Stop reason: HOSPADM

## 2023-10-23 RX ORDER — OXYCODONE HYDROCHLORIDE 5 MG/1
10 TABLET ORAL ONCE
Status: DISCONTINUED | OUTPATIENT
Start: 2023-10-23 | End: 2023-10-23

## 2023-10-23 RX ADMIN — FENTANYL CITRATE 50 MCG: 50 INJECTION INTRAMUSCULAR; INTRAVENOUS at 08:28

## 2023-10-23 RX ADMIN — MIDAZOLAM 2 MG: 1 INJECTION INTRAMUSCULAR; INTRAVENOUS at 08:23

## 2023-10-23 RX ADMIN — TRANEXAMIC ACID 1000 MG: 100 INJECTION, SOLUTION INTRAVENOUS at 08:34

## 2023-10-23 RX ADMIN — PROPOFOL 80 MCG/KG/MIN: 10 INJECTION, EMULSION INTRAVENOUS at 08:31

## 2023-10-23 RX ADMIN — OXYCODONE HYDROCHLORIDE 10 MG: 5 TABLET ORAL at 16:41

## 2023-10-23 RX ADMIN — SODIUM CHLORIDE, POTASSIUM CHLORIDE, SODIUM LACTATE AND CALCIUM CHLORIDE: 600; 310; 30; 20 INJECTION, SOLUTION INTRAVENOUS at 08:13

## 2023-10-23 RX ADMIN — FENTANYL CITRATE 100 MCG: 50 INJECTION INTRAMUSCULAR; INTRAVENOUS at 07:30

## 2023-10-23 RX ADMIN — MELOXICAM 7.5 MG: 7.5 TABLET ORAL at 06:50

## 2023-10-23 RX ADMIN — ACETAMINOPHEN 975 MG: 325 TABLET ORAL at 06:49

## 2023-10-23 RX ADMIN — KETOROLAC TROMETHAMINE 15 MG: 15 INJECTION, SOLUTION INTRAMUSCULAR; INTRAVENOUS at 16:41

## 2023-10-23 RX ADMIN — TRANEXAMIC ACID 1000 MG: 100 INJECTION, SOLUTION INTRAVENOUS at 09:31

## 2023-10-23 RX ADMIN — ONDANSETRON 4 MG: 2 INJECTION, SOLUTION INTRAMUSCULAR; INTRAVENOUS at 08:43

## 2023-10-23 RX ADMIN — FENTANYL CITRATE 50 MCG: 50 INJECTION INTRAMUSCULAR; INTRAVENOUS at 08:32

## 2023-10-23 RX ADMIN — LIDOCAINE 1 PATCH: 4 PATCH TOPICAL at 12:00

## 2023-10-23 RX ADMIN — CEFAZOLIN SODIUM 2 G: 2 INJECTION, SOLUTION INTRAVENOUS at 08:38

## 2023-10-23 RX ADMIN — PREGABALIN 75 MG: 75 CAPSULE ORAL at 06:51

## 2023-10-23 RX ADMIN — OXYCODONE 10 MG: 5 TABLET ORAL at 06:45

## 2023-10-23 RX ADMIN — MIDAZOLAM 2 MG: 1 INJECTION INTRAMUSCULAR; INTRAVENOUS at 07:30

## 2023-10-23 RX ADMIN — DEXAMETHASONE SODIUM PHOSPHATE 4 MG: 4 INJECTION, SOLUTION INTRAMUSCULAR; INTRAVENOUS at 08:41

## 2023-10-23 SDOH — HEALTH STABILITY: MENTAL HEALTH: CURRENT SMOKER: 0

## 2023-10-23 ASSESSMENT — COGNITIVE AND FUNCTIONAL STATUS - GENERAL
CLIMB 3 TO 5 STEPS WITH RAILING: A LITTLE
WALKING IN HOSPITAL ROOM: A LITTLE
MOBILITY SCORE: 20
MOVING TO AND FROM BED TO CHAIR: A LITTLE
STANDING UP FROM CHAIR USING ARMS: A LITTLE

## 2023-10-23 ASSESSMENT — COLUMBIA-SUICIDE SEVERITY RATING SCALE - C-SSRS
2. HAVE YOU ACTUALLY HAD ANY THOUGHTS OF KILLING YOURSELF?: NO
1. IN THE PAST MONTH, HAVE YOU WISHED YOU WERE DEAD OR WISHED YOU COULD GO TO SLEEP AND NOT WAKE UP?: NO
6. HAVE YOU EVER DONE ANYTHING, STARTED TO DO ANYTHING, OR PREPARED TO DO ANYTHING TO END YOUR LIFE?: NO

## 2023-10-23 ASSESSMENT — PAIN SCALES - GENERAL
PAINLEVEL_OUTOF10: 7
PAINLEVEL_OUTOF10: 0 - NO PAIN
PAINLEVEL_OUTOF10: 1
PAINLEVEL_OUTOF10: 4
PAINLEVEL_OUTOF10: 0 - NO PAIN
PAIN_LEVEL: 0
PAINLEVEL_OUTOF10: 0 - NO PAIN
PAINLEVEL_OUTOF10: 1
PAINLEVEL_OUTOF10: 0 - NO PAIN
PAINLEVEL_OUTOF10: 1
PAINLEVEL_OUTOF10: 1

## 2023-10-23 ASSESSMENT — ACTIVITIES OF DAILY LIVING (ADL)
ADL_ASSISTANCE: INDEPENDENT
ADLS_ADDRESSED: YES

## 2023-10-23 ASSESSMENT — PAIN - FUNCTIONAL ASSESSMENT
PAIN_FUNCTIONAL_ASSESSMENT: 0-10

## 2023-10-23 NOTE — ANESTHESIA PROCEDURE NOTES
Spinal Block    Patient location during procedure: OR  Start time: 10/23/2023 8:28 AM  End time: 10/23/2023 8:34 AM  Reason for block: primary anesthetic  Staffing  Performed: CRNA   Authorized by: Phillip Daniel MD    Performed by: IRAM Nj    Preanesthetic Checklist  Completed: patient identified, IV checked, risks and benefits discussed, surgical consent, pre-op evaluation, timeout performed and sterile techniques followed  Block Timeout  RN/Licensed healthcare professional reads aloud to the Anesthesia provider and entire team: Patient identity, procedure with side and site, patient position, and as applicable the availability of implants/special equipment/special requirements.  Patient on coagulant treatment: no  Timeout performed at: 10/23/2023 8:26 AM  Spinal Block  Patient position: sitting  Prep: Betadine  Sterility prep: cap, drape, gloves, hand hygiene and mask  Sedation level: moderate sedation  Patient monitoring: blood pressure, continuous pulse oximetry, EKG and ETCO2  Approach: midline  Vertebral space: L4-5  Needle  Needle type: Bebo   Needle gauge: 22 G  Free flowing CSF: yes    Assessment  Block outcome: block to be assessed in the OR  Procedure assessment: patient sedated but conversant throughout procedure  Additional Notes  Bupivacaine 0.75% 7.5mg    EXP 11-  LOT [id number]

## 2023-10-23 NOTE — H&P
[facility] Department of Orthopaedic Surgery   Surgical History & Physical <30 Days    Reason for Surgery: Left knee OA  Planned Procedure: Left total knee arthroplasty    History & Physical Reviewed:  I have reviewed the History and Physical for obtained within the last 30 days. Relevant findings and updates are noted below:  No significant changes.    Home medications were reviewed with significant updates noted below:  No significant changes.    ERAS patient?: No    COVID-19 Risk Consent:   Surgeon has reviewed the key risks related to phil COVID-19 and subsequent sequelae.     10/23/23 at 6:41 AM - Brett Phillip MD

## 2023-10-23 NOTE — PROGRESS NOTES
PCN checked and patient out of surgery at [facility]. Referral placed internally for [facility] team. PCN sent [employer] message to [facility] informing her of the referral. PCN informed that insurance verified prior to surgery. ADOD today 10/23. [facility] team aware. PCN asked if able to accept to please provide SOC. Awaiting response.      Marciano Martinez

## 2023-10-23 NOTE — CARE PLAN
Problem: Balance  Goal: LTG - Patient will maintain balance to allow for safe mobility  Outcome: Progressing     Problem: Mobility  Goal: LTG - Patient will navigate 4-6 steps with rails/device  Outcome: Progressing     Problem: Safety  Goal: LTG - Patient will demonstrate safety requirements appropriate to situation/environment  Outcome: Progressing     Problem: Transfers  Goal: LTG - Patient will demonstrate safe transfer techniques  Outcome: Progressing     Problem: Pain  Goal: LTG - Patient will manage pain with the appropriate technique/Intervention  Outcome: Progressing     Problem: Compromised Skin Integrity  Goal: LTG - Patient will be free from infection  Outcome: Progressing

## 2023-10-23 NOTE — ANESTHESIA PROCEDURE NOTES
Peripheral Block    Patient location during procedure: pre-op  Start time: 10/23/2023 7:34 AM  End time: 10/23/2023 7:36 AM  Reason for block: at surgeon's request  Staffing  Performed: attending   Authorized by: Phillip Daniel MD    Performed by: Phillip Daniel MD  Preanesthetic Checklist  Completed: patient identified, IV checked, site marked, risks and benefits discussed, surgical consent, monitors and equipment checked, pre-op evaluation and timeout performed   Timeout performed at: 10/23/2023 7:29 AM  Peripheral Block  Patient position: laying flat  Prep: ChloraPrep  Patient monitoring: heart rate, cardiac monitor and continuous pulse ox  Block type: adductor canal  Laterality: left  Injection technique: single-shot  Guidance: ultrasound guided  Needle  Needle type: Sinan   Needle gauge: 22 G  Needle length: 10 cm  Needle localization: ultrasound guidance     image stored in chart  Test dose: negative  Assessment  Injection assessment: negative aspiration for heme, no paresthesia on injection, incremental injection and local visualized surrounding nerve on ultrasound  Heart rate change: no  Slow fractionated injection: no  Additional Notes  USED MARCAINE 0.5 % TOTAL 10ML, ROPIVICAINE 0.5% TOTAL 10ml and DEXAMETHASONE 10MG

## 2023-10-23 NOTE — DISCHARGE INSTRUCTIONS
MD Ibeth Dejesus MPAS, SERGIO, ATC  Adult Reconstruction and Joint Replacement Surgery  Phone: [phone]     Fax:[phone]              DISCHARGE INSTRUCTIONS      PLEASE READ CAREFULLY BEFORE CONTACTING YOUR PROVIDER.    WE WORK COLLABORATIVELY AS A TEAM. CALLING MULTIPLE STAFF MEMBERS REGARDING THE SAME ISSUE WILL DELAY YOUR CARE.    [employer] IS THE PREFERRED COMMUNICATION FOR ALL TEAM MEMBERS.    POSTOPERATIVE INSTRUCTIONS: TOTAL HIP & TOTAL KNEE ARTHROPLASTY    JOINT CARE TEAM  Please use the information below to contact your care team following surgery.  If you are leaving a message or using the [employer] Chart portal, please include your full name, date of birth and date of surgery so that we can correctly identify you.  Your call will be returned within 1-2 business days, please do not leave multiple messages with other staff regarding a single issue while you are awaiting a return call.     Who to call Contact Information Matters needing handled   Ibeth Christian PA-C  Physician Assistant [employer] [facility]  [phone] opt. 2 Prescription Refills   Medical questions/concerns       Tierra Caldwell MBA, BSN, RN-BC  Ortho Coordinator Margaret Barrow, RN, BSN  Ortho Coordinator Lisa DÍAZN-BC  Ortho Nurse Navigator   [phone] Nursing, medical question related questions or concerns within 6 weeks of surgery   Orders for Outpatient Physical Therapy  Prescription refills         Darshana Hook-    [email or web address]           [phone]  opt. 2    [phone] fax  [phone]         Prescription Refills  Scheduling office Visits  Medical questions/concerns  Leave of Absence or other paperwork  Any concerns more than 6 weeks from surgery - an appointment will need to be made     MEDICATION REFILLS - Ibeth Christian PA-C ([employer], pharmacy request or Main Office [phone].    -You  will NOT receive a call indicating that your prescription has been filled.  Please contact your pharmacy with any questions.    Medication refills will be filled Monday-Friday 7am to 1pm ONLY. Please call the office or send a [employer] message for a refill request.  Any requests received outside of this timeframe will be handled on the next business day.  Please do not call multiple times or call other members of the care team for medication needs, this will cause the refill to take longer.    Per State and Institutional policy, pain medications can only be refilled every 7 days for up to six weeks following surgery.    My Chart Portal: If you are using the My Chart portal and are requesting a medication refill, please list what type of surgery you had and left or right side, medication that needs refilled, and pharmacy you would like your medication sent.     WEIGHT BEARING-As Tolerated    ACTIVITY-As Tolerated    DRIVING & TRAVEL AFTER SURGERY   Patients should anticipate waiting at least 4-6 weeks before traveling long distances after surgery.  You will need to stop to walk around ever 1 hour during your travel to help with blood clot prevention.    Patients may not drive until cleared by the joint nurse or the office and you are off of all narcotics.    DENTAL PROCEDURES & CLEANINGS  You must wait a minimum of 3 months for elective dental appointments after a total joint replacement, including routine cleanings or dental work including bridges, crowns, extractions, etc.. Unless, it is an emergency. You will need a prophylactic antibiotic lifelong prior to any dental visit, cleaning or procedure. Your surgeons office or your dentist may provide a prescription antibiotic. Antibiotics are a lifelong need before dental appointments.      You do not need antibiotics for endoscopic procedures such as colononoscopy or EGD, dematologic biopsies or eye surgeries.    WOUND CARE  If you experience continued drainage or  bleeding, you may cover with abdominal/Maxi pads (purchase at local drug store).  Knee replacements should wrap with an ace wrap.  You may shower with waterproof dressing on. Your surgical bandage will be removed by your home therapist 1 week after surgery. If you have staples intact, home care will remove in 2 weeks. If you have sutures intact, you will need to return to the office in 2 weeks for suture removal. Once the dressing is removed by home care, you may continue to shower. Let soap and water wash over the wound. DO NOT SCRUB.  Steri-strips under the bandage will remain in place until they fall off on their own.  If they are loose, you may gently remove.  If they have not fallen off in two weeks, gently peel them off. Do not remove if pulling causes resistance against the incision.  You will see suture tails sticking out of the ends of the incision.  DO NOT CUT THEM.  They will fall off when the sutures dissolve.  If they are bothersome, cover with a band aid.  Do not soak in a bath tub, hot tub, pool or lake until you are 8 weeks out from surgery.  Do not apply lotions, creams or ointments until you are 6 weeks out from surgery,    PAIN, SWELLING, BRUISING & CLICKING  Pain and swelling are a natural part of your recovery which is considered normal for up to a year after surgery.  Symptoms may be treated with movement, ice, compression stockings, elevating your leg, and by following the pain medication regimen as prescribed.  Bruising is normal for several weeks after surgery. You may also have leg swelling and pain in your shin.  You may ice areas that are tender to help with discomfort.  You are required to wear the provided compression stockings, every day, for 4 weeks following surgery.  Remove the stockings at night and place them back on in the morning.  Pain and swelling may temporarily increase with an increase in activity or exercise.  Use ice after activity.  Audible clicking with movement or  exercises is considered normal following joint replacement.  If this persists at 6 months or 1 year, please notify your surgeon.  You may also feel decreased sensation or numbness near the incision site.  This is normal and sensation may or may not return.    PERSONAL HYGIENE  You may shower upon discharging from the hospital.  Soap and water is permitted to run over the surgical dressing, steri-strips and incision.  Do not scrub directly over these items.  DO NOT soak your incision in a bath, hot tub, pool or pond/lake for a minimum of 8 weeks following your surgery.  DO NOT use lotions, creams, ointments on your wound for a minimum of 6 weeks following your surgery. At that time you may use vitamin E to assist with softening of your incision.      RESTARTING HOME ROUTINE - DIET & MEDICATIONS  Post-operative constipation can result due to a combination of inactivity, anesthesia and pain medication. To help prevent this, you should increase your water and fiber intake. Physical activity such as walking will also help stimulate the bowels.   You may resume your normal diet when you discharge home.  Choose foods that help promote good bowel habits and prevent constipation, such as foods high in fiber.  You may restart your home medications the following day after your surgery UNLESS you have been given alternate instructions.  Follow the instructions given to you on your hospital discharge instructions for more information regarding your home medications.      IN-HOME PHYSICAL THERAPY & OUTPATIENT PHYSICAL THERAPY  In-home physical therapy will start 1-2 days after you get home from the hospital.    The home care agency will call within the first 24-48 hours to set up their first visit.  Please do not call your care team to inquire during this timeframe.  Continue the exercises you were given in the hospital until you have been seen by in-home therapy.  Make sure to provide a phone number with the ability for the home  care staff to leave a message if you do not answer your phone.    Outpatient physical therapy following knee replacement surgery should begin 2-3 weeks after surgery.  You will be given physical therapy order prior to discharge from the hospital. You should call to schedule this appointment ASAP if not already scheduled before surgery.  Waiting until you are ready for outpatient physical therapy will cause a delay in your care.  You may choose any outpatient physical therapy location.      EMERGENCIES - WHEN TO CONTACT THE SURGEON'S OFFICE IMMEDIATELY  Fever >101 with chills that has been present for at least 48 hours.   Excessive bleeding from incision that will not slow down. A small amount of drainage is normal and expected.  Once pressure is applied and the area is covered, do not continue to check the area regularly.  This will remove pressure and bleeding will continue.  Leave in place for 4-6 hours.  Signs of infection of incision-excessive drainage that is soaking through your dressing (especially if it is pus-like), redness that is spreading out from the edges of your incision, or increased warmth around the area.  Excruciating pain for which the pain medication, taken as instructed, is not helping.  Severe calf pain.  Go directly to the emergency room or call 911, if you are experiencing chest pain or difficulty breathing.    ICE & COLD THERAPY INSTRUCTIONS    To assist with pain control and post-op swelling, you should be using ice regularly throughout recovery, especially for the first 6 weeks, regardless of the cold therapy method you use.      Always make sure there is a layer of protection between the cold pad and your skin.    If you are using ICE PACKS or GEL PACKS, you will need to alternate 20 minutes on, 20 minutes off twice per hour.    If you are using an ICE MACHINE, please follow the provided ice machine instructions.  These devices differ from ice or ice packs whereas the mechanism  circulates water through tubing and a pad to provide longer periods of cold therapy to the desired site.  You can use your cold devices around the clock for optimal comfort.  We recommend using cold therapy after working with therapy or completing exercises on your own.  There is no set schedule in which you must follow while using cold therapy.  Below are a few points to remember when using a cold therapy device:    You do not need to need to use the 20 on, 20 off method.  Detach the pad from the cooler and ambulate at least once every hour.  You can check your skin under the pad at this time.  You may wear the cold therapy device during periods of sleep including overnight.  If you wake up during the night, you can check the skin at this time.  You do not need to wake up specifically to perform skin checks.  Empty the cooler and pad when device is not in use.  Follow 's instructions for cleaning your cold therapy device.    Travel Program-If you are a [employer], [employer], [employer] travel patient, the above instructions apply BUT you will need to follow-up with your surgeon 1 week prior to traveling home. If you have any questions, please reach out to Aimee John RN, BSN,ONC at [phone].    DISCHARGE MEDICATIONS - Please reference the sample schedule on the reverse side for instructions on how to best schedule medications.    PAIN MEDICATION    ___X_ Tramadol / Oxycodone  Tramadol and Oxycodone have been prescribed for post-operative pain control.    These medications will only be refilled ONCE every 7 days for a period of up to 6 weeks following surgery.  After 6 weeks, you will transition to acetaminophen and over -the- counter anti-inflammatories such as Ibuprofen, Advil or Aleve in conjunction with ICE/COLD THERAPY.   Side effects may be constipation and nausea, vomiting, sleepiness, dizziness, lightheadedness, headache, blurred vision, dry mouth sweating, itching (if you have itching, over-the -counter  Benadryl can be used as needed).  You may NOT operate a motor vehicle while taking these medications or have been cleared by your care team.     ___X_ Acetaminophen (Tylenol)  Acetaminophen has been prescribed as an adjunct for pain control. Take two 500 mg tablets every 6 hours for 4 weeks. You will not receive a refill on this medication.  Do not exceed 4000mg of acetaminophen within a 24 hour period.  Side effects may include nausea, heartburn, drowsiness, and headache.    ___X_ Meloxicam (Mobic)-Meloxicam has been prescribed as an adjunct anti-inflammatory to assist in pain control.    Take one 15mg tablet once daily for 4 weeks.  You will not receive refills on this medication.   Side effects may include nausea.  May not be prescribed if you are on a more potent blood thinner than aspirin or have chronic kidney disease.    BLOOD THINNER    ___X_ Blood Thinner   A blood thinner has been prescribed to prevent blood clots in your leg or lungs. Take as prescribed on the bottle for 4 weeks. You will not receive a refill on this medication.        ANTI NAUSEA    ___X_ Proton Pump Inhibitor (PPI)-Stomach Acid Reduction Medication  If you are already on a PPI, you will continue your regular medication. If you are not, you will be prescribed Pantoprazole to help with nausea and protect your stomach while taking pain medication.  You will not receive a refill on this medication.    STOOL SOFTENERS    ___X_ Colace (Docusate Sodium) & Miralax (polyethylene glycol)  Take both medications to help with constipation while using the Oxycodone and Tramadol for pain control.  You will not receive a refill on this medication.    Continued Constipation  If you continue to be constipated despite daily use of Miralax and colace, you try an over-the-counter Dulcolax Suppository and use per instructions on the package.     You will not receive refills on the following medications.   Acetaminophen  (Tylenol  Meloxicam  Miralax  Colace  Pantoprazole  Blood Thinner  Pain Medication Refills -[phone] or MyChart- Monday through Friday 7am-1pm    FOLLOW-UP- You should have an appointment with either Dr. Abdul or LISETTE Grayson in 6 weeks.         SAMPLE              The times below are an example of how to organize medications to optimize pain control  Your actual medication schedule may vary based on your last dose taken IN THE HOSPITAL      Time 3:00 am 6:00 am 9:00 am 12:00 pm 3:00 pm 6:00 pm 9:00 pm 12:00 am   Medications Tramadol Tylenol  Oxycodone  Miralax   Blood Thinner  Colace  Pantoprazole or other PPI  Tramadol  Meloxicam Tylenol  Oxycodone Tramadol Tylenol  Oxycodone  Miralax Blood Thinner  Colace  Tramadol   Tylenol  Oxycodone            You may begin to wean off the pain medication as your pain remains controlled with increased activity.  The schedules provided are meant to serve as an example.  You may wean off based on your pain control.  Please note that pain medications are not filled beyond 6 weeks after surgery.              The times below are an example of how to WEAN OFF medications WHILE CONTINUING TO OPTIMIZE PAIN CONTROL.  Your actual medication schedule may vary based on your last dose taken.  Time 12:00am 4:00am 8:00am 12:00pm 4:00pm 8:00pm   Med Tramadol Oxycodone   Tramadol Oxycodone Tramadol Oxycodone     Time 12:00am 6:00am 12:00pm 6:00pm   Med Tramadol Oxycodone   Tramadol Oxycodone     Time 12:00am 8:00am 4:00pm   Med Tramadol Oxycodone   Tramadol     Time 12:00am 12:00pm   Med Tramadol Tramadol

## 2023-10-23 NOTE — BRIEF OP NOTE
Date: 10/23/2023  OR Location: [facility]    Name: Mohit Villatoro, : 1955, Age: 67 y.o., MRN: [id number], Sex: male    Diagnosis  * No Diagnosis Codes entered * * No Diagnosis Codes entered *     Procedures    * LEFT TOTAL KNEE ARTHROPLASTY (DEPUY ATTUNE, PINEAPPLE HUE) *RAPID RECOVERY    Surgeons      * Kameron Abdul - Primary    Resident/Fellow/Other Assistant:  Surgeon(s) and Role:    Procedure Summary  Anesthesia: Consult  ASA: I  Anesthesia Staff: Anesthesiologist: Phillip Daniel MD  CRNA: JN [id number]  Estimated Blood Loss: 25mL  Intra-op Medications:   Medication Name Total Dose   ropivacaine-epinephrine-clonidine-ketorolac 2.46-0.005- 0.0008-0.3mg/mL periarticular syringe 50 mL   lactated Ringer's infusion 45 mL              Anesthesia Record               Intraprocedure I/O Totals          Intake    Propofol Drip 0.00 mL    The total shown is the total volume documented since Anesthesia Start was filed.    lactated Ringer's infusion 1300.00 mL    ceFAZolin in dextrose (iso-os) 2 gram/100 mL 100.00 mL    Total Intake 1400 mL       Output    Est. Blood Loss 25 mL    Total Output 25 mL       Net    Net Volume 1375 mL          Specimen: No specimens collected     Staff:   Circulator: Crystal Bruce RN  Scrub Person: Celia De Dios PA-C; Yissel Ruiz          Findings: Left knee osteoarthritis    Complications:  None; patient tolerated the procedure well.     Disposition: PACU - hemodynamically stable.  Condition: stable

## 2023-10-23 NOTE — PROGRESS NOTES
Medication Education     Medication education for Mohit Villatoro was provided to the patient and family for the following medication(s):  Oxycodone  Tramadol  Miralax/Senna  Docusate  Tylenol  Meloxicam  Pantoprazole  Aspirin      Medication education provided by a Pharmacist:  -Proper dose, indication, possible ADRs   -How the medication works and benefits of taking it   -Importance of compliance   -Potential duration of therapy    Identified potential barriers to education:  None    Method(s) of Education:  Verbal Written materials provided and reviewed    An opportunity to ask questions and receive answers was provided.     Assessment of understanding the patient and family:  2= meets goals/outcomes    Additional Notes (if applicable): Meds to beds given to patient.    Alicia Waters, AbigailD

## 2023-10-23 NOTE — PROGRESS NOTES
PCN received [employer] message from [facility] team and they are able to accept and SOC will be tomorrow 10/24. PCN informed GEMMA Sheets at [facility] as well.     Marciano Martinez

## 2023-10-23 NOTE — PERIOPERATIVE NURSING NOTE
Handoff report called from Larry GIBBONS RN     Patient arrived to rapid recovery room at 1121 - [facility]. Patient a&0x4, VS stable, denies pain and nausea, assessment completed.     Patient son to arrive at 12pm.    Patient resting comfortable in bed, bed locked and lowered, call light in reach.

## 2023-10-23 NOTE — DISCHARGE SUMMARY
MD Ibeth Dejesus, TANNERS, PARheaC, ATC  Adult Reconstruction and Joint Replacement Surgery  Phone: [phone]     Fax:[phone]                      Discharge Summary    Discharge Diagnosis  Left Total Knee Arthroplasty    Issues Requiring Follow-Up  Home care services to start within 48 hours. Outpatient PT to start 2 weeks  S/P total Joint for Knees only.    Test Results Pending At Discharge  Pending Labs       No current pending labs.            Hospital Course  Patient underwent Left Total Knee Arthroplasty on 10/23/23 without complications. The patient was then taken to the PACU in stable condition. Patient was then transferred to the or.  Pain was appropriately controlled. Diet was advanced as tolerated. Patient progressed adequately through their recovery during hospital stay including PT/ OT and were recommended for discharge. Patient was then discharged on  to home in stable condition. Patient had uneventful hospital course. Patient was instructed on the use of pain medications as needed for pain. The signs and symptoms of infection were discussed and the patient was given our number to call should they have any questions or concerns following discharge.    Based on my clinical judgment, the patient was provided with a 7-day prescription for opioid medication at 30 MED, indicated for treatment of acute pain in the setting of recent Total Joint Arthroplasty. OARRS report was run and has demonstrated an appropriate time course.  The patient has been provided with counseling pertaining to safe use of opioid medication.    Patient may use operative extremity WBAT with use of walker for assistance with ambulation .  Mepilex dressing to be removed POD # 7 by home care and incision left open to air  OAC for DVT prophylaxis started on POD #1 and to be taken for 30 days    Patient is to follow-up in 6 weeks at scheduled post-op visit.     Face-to Face  Pertinent Physical Exam At  Time of Discharge  Physical Exam  Vitals and nursing note reviewed. VSS, Afebrile  Constitutional:       Appearance: Normal appearance, awake and alert.  HENT:      Head: Normocephalic and atraumatic.       Pupils: Pupils are equal, round, and reactive to light.   Cardiovascular:      Rate and Rhythm: Normal rate and regular rhythm.   Pulmonary:      Effort: Pulmonary effort is normal.     Abdominal:         Palpations: Abdomen is soft.   Musculoskeletal:   Sensation intact bilaterally, sural/saph/sp/tibal n.  Motor intact flexion/extension/DF/PF/EHL/FHL bilaterally. Palpable symmetric DP/PT pulse bilaterally.    Skin:      Bulky Dressing intact to the surgical extremity. No signs of gross bloody or purulent drainage.     General: Skin is warm and dry.      Capillary Refill: Capillary refill takes less than 2 seconds.   Neurological:      General: No focal deficit present.      Mental Status: She is alert and oriented to person, place, and time. Mental status is at baseline.   Psychiatric:         Mood and Affect: Mood normal.        Home Medications  Scheduled medications  No current facility-administered medications for this encounter.    Current Outpatient Medications:     buPROPion SR (Wellbutrin SR) 100 mg 12 hr tablet, Take 1 tablet (100 mg) by mouth 2 times a day. Do not crush, chew, or split., Disp: , Rfl:     chlorhexidine (Peridex) 0.12 % solution, Use 15 mL in the mouth or throat if needed for wound care for up to 2 doses. Do not start before October 22, 2023. (Patient not taking: Reported on 10/17/2023 Do not start before October 22, 2023.), Disp: 30 mL, Rfl: 0    meloxicam (Mobic) 15 mg tablet, Take 1 tablet (15 mg) by mouth once daily. Take with food., Disp: , Rfl:     tadalafil (Cialis) 10 mg tablet, Take 1 tablet (10 mg) by mouth if needed., Disp: , Rfl:     traZODone (Desyrel) 50 mg tablet, Take 1 tablet (50 mg) by mouth once daily at bedtime., Disp: , Rfl:      PRN medications      Discharge  medications     Your medication list         Notice    Cannot display discharge medications because the patient has not yet been admitted.         You have not been prescribed any medications.       Outpatient Follow-Up  Patient to follow-up with /Ibeth Christian PA-C.  Thank you for trusting us with your care. You should be scheduled for a follow-up post-surgical visit in 6 weeks.      Please read discharge instructions provided by your surgeon before calling with questions as this will delay care.    Medication refills-Oxycodone and Tramadol will be refilled every 7 days per state law. [facility] All medication requests may take up to 72 hours to refill and refills after Friday 1pm will be refilled on the next business day.      No future appointments.

## 2023-10-23 NOTE — PROGRESS NOTES
Physical Therapy    Physical Therapy Evaluation & Treatment    Patient Name: Mohit Villatoro  MRN: [id number]  Today's Date: 10/23/2023   Time Calculation  Start Time: 1510  Stop Time: 1603  Time Calculation (min): 53 min    Assessment/Plan   Pt presents with impaired functional mobility s/p L TKR. Recommend discharge home with 24 hour supervision for the first day due mildly soft L knee, then after that intermittent assistance and home health PT. Pt was issued HEP handout. Pt verbalized and demonstrated understanding.   PT Assessment  PT Assessment Results: Decreased strength, Decreased range of motion, Decreased endurance, Impaired balance, Decreased mobility, Decreased coordination, Orthopedic restrictions, Pain  Rehab Prognosis: Excellent  Evaluation/Treatment Tolerance: Patient tolerated treatment well  Medical Staff Made Aware: No  Strengths: Ability to acquire knowledge, Support of extended family/friends, Premorbid level of function  End of Session Communication: Bedside nurse  End of Session Patient Position: Up in chair  IP OR SWING BED PT PLAN  Inpatient or Swing Bed: Inpatient  PT Plan  Treatment/Interventions: Bed mobility, Transfer training, Gait training, Stair training, Balance training, Therapeutic exercise, Range of motion, Endurance training, Strengthening, Therapeutic activity, Home exercise program  PT Plan: Skilled PT  PT Frequency: BID  PT Discharge Recommendations: Low intensity level of continued care  Equipment Recommended upon Discharge: Wheeled walker  PT Recommended Transfer Status: Stand by assist  PT - OK to Discharge: Yes      Subjective     General Visit Information:  General  Reason for Referral: L TKR  Referred By: Dr. Abdul  Past Medical History Relevant to Rehab: ADEAL B hips, L femur fracture, shingles, OA, BRUCE.  Family/Caregiver Present: Yes (son)  Prior to Session Communication: Bedside nurse  Home Living:  Home Living  Type of Home: House  Lives With: Adult children  Home  Adaptive Equipment: Walker rolling or standard, Cane  Home Layout: Multi-level, Bed/bath upstairs, Stairs to alternate level with rails  Alternate Level Stairs-Number of Steps: 12  Home Access: Stairs to enter without rails  Entrance Stairs-Rails: None  Entrance Stairs-Number of Steps: 1  Bathroom Shower/Tub: Walk-in shower  Bathroom Equipment: Built-in shower seat  Prior Level of Function:  Prior Function Per Pt/Caregiver Report  Level of [place]: Independent with ADLs and functional transfers, Independent with homemaking with ambulation  ADL Assistance: Independent  Homemaking Assistance: Independent  Ambulatory Assistance: Needs assistance  Transfers: Independent  Gait: Assistive device (cane PRN)  Stairs: Assistive device (cane PRN)  Vocational: Full time employment (desk job)  Prior Function Comments: pt denies falls prior to admission.  Precautions:  Precautions  LE Weight Bearing Status: Weight Bearing as Tolerated  Post-Surgical Precautions: Left total knee precautions    Objective   Pain:  Pain Assessment  Pain Assessment: 0-10  Pain Score: 1  Pain Type: Surgical pain  Pain Location: Knee  Pain Orientation: Left  Cognition:  Cognition  Overall Cognitive Status: Within Functional Limits  Attention: Within Functional Limits  Memory: Within Funtional Limits  Safety/Judgement: Within Functional Limits    General Assessments:  Activity Tolerance  Endurance: Tolerates 10 - 20 min exercise with multiple rests    Sensation  Light Touch: No apparent deficits    Coordination  Movements are Fluid and Coordinated: No  Lower Body Coordination: mild LLE deficits due to pre-op nerve block.    Static Sitting Balance  Static Sitting-Balance Support: Feet supported  Static Sitting-Level of Assistance: Independent  Dynamic Sitting Balance  Dynamic Sitting-Balance Support: Feet supported  Dynamic Sitting-Comments: independent    Static Standing Balance  Static Standing-Balance Support: Bilateral upper extremity supported  (with RW)  Static Standing-Level of Assistance: Close supervision  Dynamic Standing Balance  Dynamic Standing-Balance Support: Bilateral upper extremity supported (with RW)  Dynamic Standing-Comments: close supervision  Functional Assessments:  ADL  ADL's Addressed: Yes  LE Dressing Assistance: Stand by  LE Dressing Deficit: Verbal cueing, Thread LLE into underwear    Bed Mobility  Bed Mobility: Yes  Bed Mobility 1  Bed Mobility 1: Supine to sitting  Level of Assistance 1: Distant supervision    Transfers  Transfer: Yes  Transfer 1  Transfer From 1: Bed to, Chair with arms to, Wheelchair to  Transfer to 1: Wheelchair, Chair with arms  Technique 1: Stand to sit, Sit to stand  Transfer Device 1: Walker  Transfer Level of Assistance 1: Minimal verbal cues, Minimum assistance, Close supervision  Trials/Comments 1: verbal cues for hand placement and L quad contraction due to mild L knee hyperextension    Ambulation/Gait Training  Ambulation/Gait Training Performed: Yes  Ambulation/Gait Training 1  Surface 1: Level tile  Device 1: Rolling walker  Assistance 1: Close supervision  Quality of Gait 1:  (Pt demonstrated decreased sunshine, decreased step length, step to pattern, progressing to reciprocal pattern, mild L knee hyperextension, cues for L quad contraction during stance phase, (see below))  Comments/Distance (ft) 1: 150 feet x 1 ((continued from above) one episode of very minor L knee buckle, in which patient self corrected with BUE support on AD.)    Stairs  Stairs: Yes  Stairs  Rails 1: Left  Curb Step 1: No  Device 1: Single point cane  Assistance 1: Close supervision, Minimal verbal cues  Comment/Number of Steps 1: 3 six inch steps (Pt demonstrated step to pattern, decreased sunshine, verbal cues to contract L quad during stance phase to provent L knee buckling, no LOB noted, no buckling noted on stairs.)  Extremity/Trunk Assessments:  RUE   RUE : Within Functional Limits  LUE   LUE: Within Functional Limits  RLE    RLE : Within Functional Limits  LLE   LLE : Exceptions to WFL  AROM LLE (degrees)  LLE AROM Comment: knee ROM limited due to post-op pain.  Strength LLE  LLE Overall Strength: Greater than or equal to 3/5 as evidenced by functional mobility (mildly soft L knee with mobility)  Treatments:  Therapeutic Exercise  Therapeutic Exercise Performed: Yes  B ankle pumps, L quad sets, L gluteal sets, L heel slides, L SAQ, L hip abduction, and L SLR x 10 reps each.    Outcome Measures:  [facility] Basic Mobility  Turning from your back to your side while in a flat bed without using bedrails: None  Moving from lying on your back to sitting on the side of a flat bed without using bedrails: None  Moving to and from bed to chair (including a wheelchair): A little  Standing up from a chair using your arms (e.g. wheelchair or bedside chair): A little  To walk in hospital room: A little  Climbing 3-5 steps with railing: A little  Basic Mobility - Total Score: 20    Encounter Problems       Encounter Problems (Active)       Balance       LTG - Patient will maintain balance to allow for safe mobility (Progressing)       Start:  10/23/23               Compromised Skin Integrity       LTG - Patient will be free from infection (Progressing)       Start:  10/23/23               Mobility       LTG - Patient will navigate 4-6 steps with rails/device (Progressing)       Start:  10/23/23               Pain          Safety       LTG - Patient will demonstrate safety requirements appropriate to situation/environment (Progressing)       Start:  10/23/23               Transfers       LTG - Patient will demonstrate safe transfer techniques (Progressing)       Start:  10/23/23                   Education Documentation  Handouts, taught by Ro Peguero PT at 10/23/2023  4:05 PM.  Learner: Family, Patient  Readiness: Eager  Method: Explanation, Handout, Demonstration  Response: Verbalizes Understanding, Demonstrated Understanding    Precautions,  taught by Ro Peguero PT at 10/23/2023  4:05 PM.  Learner: Family, Patient  Readiness: Eager  Method: Explanation, Handout, Demonstration  Response: Verbalizes Understanding, Demonstrated Understanding    Body Mechanics, taught by Ro Peguero PT at 10/23/2023  4:05 PM.  Learner: Family, Patient  Readiness: Eager  Method: Explanation, Handout, Demonstration  Response: Verbalizes Understanding, Demonstrated Understanding    Home Exercise Program, taught by Ro Peguero PT at 10/23/2023  4:05 PM.  Learner: Family, Patient  Readiness: Eager  Method: Explanation, Handout, Demonstration  Response: Verbalizes Understanding, Demonstrated Understanding    Mobility Training, taught by Ro Peguero PT at 10/23/2023  4:05 PM.  Learner: Family, Patient  Readiness: Eager  Method: Explanation, Handout, Demonstration  Response: Verbalizes Understanding, Demonstrated Understanding    Education Comments  No comments found.    Ro Peguero, RAMON, DPT

## 2023-10-23 NOTE — OP NOTE
LEFT TOTAL KNEE ARTHROPLASTY (DEPUY ATTUNE, PINEAPPLE HUE) *RAPID RECOVERY (L) Operative Note     Date: 10/23/2023  OR Location: [facility]    Name: Mohit Villatoro, : 1955, Age: 67 y.o., MRN: [id number], Sex: male    Diagnosis  M17.12 * No Diagnosis Codes entered *     Procedures    * LEFT TOTAL KNEE ARTHROPLASTY (DEPUY ATTUNE, PINEAPPLE HUE) *RAPID RECOVERY    Surgeons      * Kameron Abdul - Primary    Resident/Fellow/Other Assistant:  Kenji    Procedure Summary  Anesthesia: Consult  ASA: I  Anesthesia Staff: Anesthesiologist: Phillip Daniel MD  CRNA: [id number]  Estimated Blood Loss: 25mL  Intra-op Medications:   Medication Name Total Dose   ropivacaine-epinephrine-clonidine-ketorolac 2.46-0.005- 0.0008-0.3mg/mL periarticular syringe 50 mL   lactated Ringer's infusion 45 mL              Anesthesia Record               Intraprocedure I/O Totals          Intake    Propofol Drip 0.00 mL    The total shown is the total volume documented since Anesthesia Start was filed.    lactated Ringer's infusion 1300.00 mL    ceFAZolin in dextrose (iso-os) 2 gram/100 mL 100.00 mL    Total Intake 1400 mL       Output    Est. Blood Loss 25 mL    Total Output 25 mL       Net    Net Volume 1375 mL          Specimen: No specimens collected     Staff:   Circulator: Crystal Bruce RN  Scrub Person: Celia De Dios PA-C; Yissel Ruiz         Drains and/or Catheters:   Closed/Suction Drain Left Knee Accordion (Active)       Implants:  Implants       Type Name Action Serial No.      Joint Knee INSERT, ATTUNE PS FB, SZ 6, 6MM - [id number] Implanted       SIZE 5 ATTUNE FIXED BEARING TIBIAL BASE, CEMENTED DUPLICATE ENTRY, PLEASE TRANSITION TO CATALOG # [id number] Implanted      Joint Knee DOME, PATELLA, MEDIALIZED, 38MM - [id number] Implanted      Joint Knee FEMORAL, ATTUNE PS, RAJNI, SZ 6, LT - [id number] Implanted      Joint Knee BONE CEMENT, SMART SET, HIGH VISCOSITY, 40GM - [id number] Implanted                Findings: Advanced OA    Indications: Mohit Villatoro is an 67 y.o. male who is having surgery for left knee OA    PREOPERATIVE DIAGNOSIS:  left knee osteoarthritis     POSTOPERATIVE DIAGNOSIS:  left knee osteoarthritis     OPERATION/PROCEDURE:  left total knee arthroplasty     SURGEON:  Kameron Abdul MD     ASSISTANT(S):  Kenji     ANESTHESIA:  spinal     LOCATION:  [facility]     ESTIMATED BLOOD LOSS AND INTRAVENOUS FLUIDS:  Please see Anesthesia record.     COMPONENTS USED:  DePuy Attune knee system  1. 5 femur  2. 4 tibia  3. 38 patella  4. 6mm insert    PRE-OP ROM:      OPERATIVE REPORT:  The patient was transferred to the operating room table.  Time-out  was performed confirming patient name, medical record number,  surgical site, and adequate and appropriate imaging.  The patient  received appropriate IV antibiotics as well as tranexamic acid.  Once we were prepped and draped,midline skin incision was performed.  Hemostasis was obtained using electrocautery.  Underlying extensor mechanism was easily identified and entered using a standard medial parapatellar arthrotomy performedsharply.  The infrapatellar and suprapatellar fat pads were debrided.Initial medial release was performed.  The patella was subluxed laterally.  Distal femur was entered using a step drill.  Distal  femoral cut was performed, and the femur was sized and prepared.  The tibia was subluxed forward using a double-prong PCL retractor.  The proximal tibia was cut in neutral mechanical axis using an  extramedullary tibial guide.  We then used the lamina  to clear out the posterior osteophytes and clear the meniscal remnants. We then sized the tibia and prepared it. We cut the patella freehand using a caliper to restore the native patellar height. We then trialed with multiple polyethylene inserts.  Once I was happy with the position of components and stability of the knee, all trial components were removed.  The  cut bony  surfaces were thoroughly irrigated and dried.  We then cemented into place the real components.  The knee was held in extension until all cement was hardened.  All extraneous cement was  removed.  We then closed the extensor mechanism over a drain using interrupted #2 Ethibond as well as interrupted 0 Vicryl.  The subcu was closed with interrupted 2-0 Vicryl.  The skin was closed with  running 3-0 Biosyn followed by Dermabond and Steri-Strips.  Dry sterile dressing was placed.  The patient was transferred back to the hospital bed without incident or complication.  She will be  weightbearing as tolerated.  They will be on ASA and SCDs for DVT  prophylaxis.     Additional Details: WBAT, ASA    Attending Attestation: I was present and scrubbed for the key portions of the procedure.    Kameron Abdul  Phone Number: [phone]

## 2023-10-23 NOTE — ANESTHESIA PREPROCEDURE EVALUATION
Patient: Mohit Villatoro    Procedure Information       Date/Time: 10/23/23 0830    Procedure: LEFT TOTAL KNEE ARTHROPLASTY (DEPUY ATTUNE, PINEAPPLE HUE) *RAPID RECOVERY (Left: Knee)    Location: [facility] / [facility]    Surgeons: Kameron Abdul MD            Relevant Problems   Neuro/Psych   (+) Adult situational stress disorder      Pulmonary   (+) Obstructive sleep apnea syndrome      Musculoskeletal   (+) Primary localized osteoarthritis of left knee      Infectious Disease   (+) Herpes zoster dermatitis   (+) Shingles     Past Medical History:   Diagnosis Date    Abnormal finding of blood chemistry, unspecified 11/30/2022    Abnormal blood chemistry    Adjustment disorder, unspecified 12/17/2021    Adult situational stress disorder    Allergic rhinitis, unspecified 09/01/2017    Allergic rhinitis    Benign prostatic hyperplasia without lower urinary tract symptoms 12/16/2022    BPH (benign prostatic hyperplasia)    Encounter for general adult medical examination without abnormal findings 11/30/2022    Welcome to Medicare preventive visit    Encounter for screening for malignant neoplasm of prostate 10/12/2022    Screening for prostate cancer    Nocturia 09/01/2017    Nocturia    Other abnormality of red blood cells 10/12/2022    Elevated MCV    Pain in right knee 01/05/2023    Bilateral knee pain    Poor urinary stream 12/16/2022    Weak urine stream    Pure hypercholesterolemia, unspecified 10/12/2022    Elevated LDL cholesterol level    Sleep disorder, unspecified 04/09/2021    Sleep disturbance    Unspecified abnormal findings in urine 09/10/2020    Urine abnormality    Unspecified skin changes 04/26/2021    Skin change     Past Surgical History:   Procedure Laterality Date    LASER OF PROSTATE W/ GREEN LIGHT PVP      Holmium laser of prostate(not green light)    OTHER SURGICAL HISTORY  11/27/2018    Hernia repair       Clinical information reviewed:   Tobacco  Allergies  Meds   Med Hx  Surg  Hx   Fam Hx  Soc Hx        NPO Detail:  NPO/Void Status  Date of Last Liquid: 10/22/23  Time of Last Liquid: 0900  Date of Last Solid: 10/22/23  Time of Last Solid: 1800  Time of Last Void: 0630         Physical Exam    Airway  Mallampati: I  TM distance: >3 FB  Neck ROM: full     Cardiovascular - normal exam     Dental - normal exam     Pulmonary - normal exam     Abdominal            Anesthesia Plan    ASA 1     spinal   (PREOP BLK)  The patient is not a current smoker.    Anesthetic plan and risks discussed with patient.    Plan discussed with CRNA and CAA.

## 2023-10-23 NOTE — ANESTHESIA POSTPROCEDURE EVALUATION
Patient: Mohit Villatoro    Procedure Summary       Date: 10/23/23 Room / Location: [facility] / [facility]    Anesthesia Start: 0823 Anesthesia Stop: 1033    Procedure: LEFT TOTAL KNEE ARTHROPLASTY (DEPUY ATTUNE, PINEAPPLE HUE) *RAPID RECOVERY (Left: Knee) Diagnosis:     Surgeons: Kameron Abdul MD Responsible Provider: Phillip Daniel MD    Anesthesia Type: spinal ASA Status: 1            Anesthesia Type: spinal    Vitals Value Taken Time   /60 10/23/23 1045   Temp 36.4 °C (97.5 °F) 10/23/23 1030   Pulse 71 10/23/23 1045   Resp 18 10/23/23 1045   SpO2 98 % 10/23/23 1045       Anesthesia Post Evaluation    Patient location during evaluation: bedside  Patient participation: complete - patient participated  Level of consciousness: responsive to verbal stimuli  Pain score: 0  Pain management: adequate  Multimodal analgesia pain management approach  Cardiovascular status: acceptable  Respiratory status: acceptable  Hydration status: acceptable        There were no known notable events for this encounter.

## 2023-10-23 NOTE — PERIOPERATIVE NURSING NOTE
Respiratory therapy has completed IS education with patient.   Physical therapy has completed assessment and education; patient has functionally cleared for discharge.  Patient has voided.   Patient has eaten.  no complaint of pain.  no complaint of nausea.   Family at bedside; discussed discharge instructions with patient and Family. All questions at this time answered.   Pharmacy has delivered patient discharge medications.   Patient has completed requirements for discharge from [facility].   Patient clinically appropriate for discharge. IV removed and patient transported to discharge area via wheelchair.

## 2023-10-24 ENCOUNTER — HOME CARE VISIT (OUTPATIENT)
Dept: HOME HEALTH SERVICES | Facility: HOME HEALTH | Age: 68
End: 2023-10-24
Payer: MEDICARE

## 2023-10-24 VITALS
DIASTOLIC BLOOD PRESSURE: 66 MMHG | SYSTOLIC BLOOD PRESSURE: 100 MMHG | TEMPERATURE: 97.2 F | RESPIRATION RATE: 16 BRPM | HEART RATE: 70 BPM

## 2023-10-24 PROCEDURE — 1090000001 HH PPS REVENUE CREDIT

## 2023-10-24 PROCEDURE — 1090000002 HH PPS REVENUE DEBIT

## 2023-10-24 PROCEDURE — 0023 HH SOC

## 2023-10-24 PROCEDURE — G0151 HHCP-SERV OF PT,EA 15 MIN: HCPCS | Mod: HHH

## 2023-10-24 PROCEDURE — 169592 NO-PAY CLAIM PROCEDURE

## 2023-10-24 ASSESSMENT — BALANCE ASSESSMENTS
SITTING BALANCE: 1 - STEADY, SAFE
BALANCE SCORE: 9
ARISES: 1 - ABLE, USES ARMS TO HELP
IMMEDIATE STANDING BALANCE FIRST 5 SECONDS: 1 - STEADY BUT USES WALKER OR OTHER SUPPORT
ARISING SCORE: 1
STANDING BALANCE: 1 - STEADY BUT WIDE STANCE AND USES CANE OR OTHER SUPPORT
NUDGED SCORE: 2
TURNING 360 DEGREES STEPS: 0 - DISCONTINUOUS STEPS
ATTEMPTS TO ARISE: 1 - ABLE, REQUIRES MORE THAN ONE ATTEMPT
EYES CLOSED AT MAXIMUM POSITION NUDGED: 1 - STEADY
NUDGED: 2 - STEADY
SITTING DOWN: 1 - USES ARMS OR NOT SMOOTH MOTION

## 2023-10-24 ASSESSMENT — GAIT ASSESSMENTS
STEP CONTINUITY: 0 - STOPPING OR DISCONTINUITY BETWEEN STEPS
PATH SCORE: 1
WALKING STANCE: 0 - HEELS APART
PATH: 1 - MILD/MODERATE DEVIATION OR USES WALKING AID
BALANCE AND GAIT SCORE: 13
INITIATION OF GAIT IMMEDIATELY AFTER GO: 0 - ANY HESITANCY OR MULTIPLE ATTEMPTS TO START
TRUNK: 0 - MARKED SWAY OR USES WALKING AID
TRUNK SCORE: 0
GAIT SCORE: 4
STEP SYMMETRY: 0 - RIGHT AND LEFT STEP LENGTH NOT EQUAL

## 2023-10-24 ASSESSMENT — ENCOUNTER SYMPTOMS
SUBJECTIVE PAIN PROGRESSION: GRADUALLY IMPROVING
PERSON REPORTING PAIN: PATIENT
PAIN SEVERITY GOAL: 0/10
PAIN: 1
PAIN LOCATION - PAIN SEVERITY: 2/10
HIGHEST PAIN SEVERITY IN PAST 24 HOURS: 2/10
PAIN LOCATION: LEFT KNEE
LOWEST PAIN SEVERITY IN PAST 24 HOURS: 1/10

## 2023-10-24 ASSESSMENT — ACTIVITIES OF DAILY LIVING (ADL)
OASIS_M1830: 05
ENTERING_EXITING_HOME: STAND BY ASSIST
AMBULATION ASSISTANCE ON FLAT SURFACES: 1

## 2023-10-24 NOTE — Clinical Note
PT start of care completed today. Pt doing well at 75 degrees flexion and lacking 6 degrees extension. Minimal pain thus far and ambulating well. No seepage or signs of infection. OT to follow for ADLs. Pt scheduled to begin outpatient PT on 11/9/23.

## 2023-10-24 NOTE — HOME HEALTH
S: This pt was referred to home health PT following L TKA. He is demonstrating increased difficulty with mobility as a result.    B: Pt lives with his sons in a multistory house with 4 stairs to exit the building. His bedroom is on the 2nd floor with 15 stairs to navigate. Pt is using wheeled walker for all ambulation but was independent with ambulation without use of assistive device prior to current episode of care. Pt reports no recent falls and rates current pain level at 2/10. Medications reconciled in the home and educated on risks involved with use of oxycodone and aspirin.    A: Pt presents with decreased L knee ROM at 75 degrees flexion and lacking 6 degrees extension, L LE weakness affecting bed mobility, transfers, gait / stairs and balance, as well as gait and balance impairment as illustrated by Tinetti score of 13/28. Pt ambulates using cane today as he used it to come down the stairs this morning and left the walker upstairs. Pt presents with antalgic gait demonstrating decreased L hip and knee flexion during swing through phase, decreased L knee extension in stance phase, decreased L stance phase time, and decreased R stride length. Pt instructed in signs and symptoms of DVT and infection and to seek immediate medical attention should any arise. Mepilex dressing intact with no seepage or signs of infection noted. HEP issued from hospital reviewed and inctructed in proper technique and frequency.     R: Pt will benefit from skilled PT for L knee ROM / stretching, L LE strengthening to facilitate bed mobility, transfers, gait / stairs and balance, as well as transfer, gait and balance training to improve functional mobility and independence.

## 2023-10-25 ENCOUNTER — TELEPHONE (OUTPATIENT)
Dept: ORTHOPEDIC SURGERY | Facility: HOSPITAL | Age: 68
End: 2023-10-25
Payer: MEDICARE

## 2023-10-25 ENCOUNTER — HOME CARE VISIT (OUTPATIENT)
Dept: HOME HEALTH SERVICES | Facility: HOME HEALTH | Age: 68
End: 2023-10-25
Payer: MEDICARE

## 2023-10-25 PROCEDURE — 1090000001 HH PPS REVENUE CREDIT

## 2023-10-25 PROCEDURE — 1090000002 HH PPS REVENUE DEBIT

## 2023-10-25 NOTE — TELEPHONE ENCOUNTER
Patient is having pain after surgery which is  not managed with pain medications.  Patient want medical advice.      YOLETTE CRESPO  [id number] [facility]  10/23, [facility]     Thanks,  Darshana Hook

## 2023-10-26 ENCOUNTER — HOME CARE VISIT (OUTPATIENT)
Dept: HOME HEALTH SERVICES | Facility: HOME HEALTH | Age: 68
End: 2023-10-26
Payer: MEDICARE

## 2023-10-26 VITALS
OXYGEN SATURATION: 98 % | HEART RATE: 92 BPM | TEMPERATURE: 98.1 F | SYSTOLIC BLOOD PRESSURE: 122 MMHG | DIASTOLIC BLOOD PRESSURE: 74 MMHG | RESPIRATION RATE: 16 BRPM

## 2023-10-26 PROCEDURE — 1090000001 HH PPS REVENUE CREDIT

## 2023-10-26 PROCEDURE — G0157 HHC PT ASSISTANT EA 15: HCPCS | Mod: HHH

## 2023-10-26 PROCEDURE — 1090000002 HH PPS REVENUE DEBIT

## 2023-10-26 PROCEDURE — G0152 HHCP-SERV OF OT,EA 15 MIN: HCPCS | Mod: HHH

## 2023-10-26 SDOH — HEALTH STABILITY: PHYSICAL HEALTH: PHYSICAL EXERCISE: 10

## 2023-10-26 SDOH — HEALTH STABILITY: PHYSICAL HEALTH: EXERCISE ACTIVITIES SETS: 2

## 2023-10-26 SDOH — HEALTH STABILITY: PHYSICAL HEALTH: EXERCISE ACTIVITY: HEELSIDES

## 2023-10-26 SDOH — HEALTH STABILITY: PHYSICAL HEALTH: EXERCISE ACTIVITY: LAQ

## 2023-10-26 SDOH — HEALTH STABILITY: PHYSICAL HEALTH: EXERCISE TYPE: LLE EXERCISES FOR R TKA REHAB

## 2023-10-26 SDOH — HEALTH STABILITY: PHYSICAL HEALTH: EXERCISE ACTIVITIES SETS: 1

## 2023-10-26 SDOH — HEALTH STABILITY: PHYSICAL HEALTH: PHYSICAL EXERCISE: SUPINE

## 2023-10-26 SDOH — HEALTH STABILITY: PHYSICAL HEALTH: EXERCISE ACTIVITY: KNEE FLEXION STRETCH ON 6 INCH STEP

## 2023-10-26 SDOH — HEALTH STABILITY: PHYSICAL HEALTH: PHYSICAL EXERCISE: SEATED

## 2023-10-26 SDOH — HEALTH STABILITY: PHYSICAL HEALTH: EXERCISE ACTIVITY: QUAD SETS

## 2023-10-26 SDOH — HEALTH STABILITY: PHYSICAL HEALTH: EXERCISE COMMENTS: AAROM NEEDED FOR KNEE EXTENSION AND KNEE FLESION.

## 2023-10-26 SDOH — HEALTH STABILITY: PHYSICAL HEALTH: PHYSICAL EXERCISE: STANDING

## 2023-10-26 SDOH — HEALTH STABILITY: PHYSICAL HEALTH: EXERCISE ACTIVITY: SAQ

## 2023-10-26 ASSESSMENT — ENCOUNTER SYMPTOMS
LOWEST PAIN SEVERITY IN PAST 24 HOURS: 3/10
PAIN LOCATION - PAIN FREQUENCY: CONSTANT
HIGHEST PAIN SEVERITY IN PAST 24 HOURS: 7/10
PAIN SEVERITY GOAL: 1/10
SUBJECTIVE PAIN PROGRESSION: GRADUALLY IMPROVING
PAIN LOCATION: LEFT KNEE
PERSON REPORTING PAIN: PATIENT
PAIN LOCATION - PAIN QUALITY: ACHE
PAIN LOCATION - PAIN SEVERITY: 3/10
PAIN: 1
PAIN LOCATION: LEFT KNEE
LOWEST PAIN SEVERITY IN PAST 24 HOURS: 0/10
MUSCLE WEAKNESS: 1
PAIN: 1
PAIN SEVERITY GOAL: 1/10
PAIN LOCATION - PAIN SEVERITY: 3/10
LIMITED RANGE OF MOTION: 1
HIGHEST PAIN SEVERITY IN PAST 24 HOURS: 7/10
SUBJECTIVE PAIN PROGRESSION: WAXING AND WANING

## 2023-10-26 ASSESSMENT — ACTIVITIES OF DAILY LIVING (ADL)
TOILETING: 1
TOILETING: INDEPENDENT
BATHING_CURRENT_FUNCTION: SUPERVISION
DRESSING_LB_CURRENT_FUNCTION: INDEPENDENT
EFFECT OF PAIN ON DAILY ACTIVITIES: INTERRUPTED SLEEP
BATHING ASSESSED: 1

## 2023-10-26 ASSESSMENT — PAIN DESCRIPTION - PAIN TYPE: TYPE: SURGICAL PAIN

## 2023-10-26 NOTE — TELEPHONE ENCOUNTER
Pernell Rueda, thank you for taking my call today.  Ad discussed, you feel that your pain is doing better today compared to Tuesday.  You are continuing to alternate between oxycodone and tramadol, icing regularly and ambulating approximately every hour throughout the day.    Please don't hesitate to reach out if you have any additional questions or concerns.    Tierra Caldwell MBA, BSN, RN-BC  Orthopedic   [facility] [phone]

## 2023-10-27 PROCEDURE — 1090000002 HH PPS REVENUE DEBIT

## 2023-10-27 PROCEDURE — 1090000001 HH PPS REVENUE CREDIT

## 2023-10-28 PROCEDURE — 1090000002 HH PPS REVENUE DEBIT

## 2023-10-28 PROCEDURE — 1090000001 HH PPS REVENUE CREDIT

## 2023-10-29 PROCEDURE — 1090000001 HH PPS REVENUE CREDIT

## 2023-10-29 PROCEDURE — 1090000002 HH PPS REVENUE DEBIT

## 2023-10-30 ENCOUNTER — HOME CARE VISIT (OUTPATIENT)
Dept: HOME HEALTH SERVICES | Facility: HOME HEALTH | Age: 68
End: 2023-10-30
Payer: MEDICARE

## 2023-10-30 VITALS
RESPIRATION RATE: 16 BRPM | DIASTOLIC BLOOD PRESSURE: 80 MMHG | TEMPERATURE: 98.2 F | SYSTOLIC BLOOD PRESSURE: 110 MMHG | HEART RATE: 93 BPM

## 2023-10-30 DIAGNOSIS — G89.18 ACUTE POST-OPERATIVE PAIN: ICD-10-CM

## 2023-10-30 PROCEDURE — 1090000001 HH PPS REVENUE CREDIT

## 2023-10-30 PROCEDURE — 1090000002 HH PPS REVENUE DEBIT

## 2023-10-30 PROCEDURE — G0157 HHC PT ASSISTANT EA 15: HCPCS | Mod: HHH

## 2023-10-30 RX ORDER — OXYCODONE HYDROCHLORIDE 5 MG/1
5 TABLET ORAL EVERY 6 HOURS PRN
Qty: 28 TABLET | Refills: 0 | Status: SHIPPED | OUTPATIENT
Start: 2023-10-30 | End: 2023-11-02 | Stop reason: SDUPTHER

## 2023-10-30 RX ORDER — TRAMADOL HYDROCHLORIDE 50 MG/1
50 TABLET ORAL EVERY 6 HOURS PRN
Qty: 28 TABLET | Refills: 0 | Status: SHIPPED | OUTPATIENT
Start: 2023-10-30 | End: 2023-11-02 | Stop reason: SDUPTHER

## 2023-10-30 SDOH — HEALTH STABILITY: PHYSICAL HEALTH: EXERCISE ACTIVITY: SLR

## 2023-10-30 SDOH — HEALTH STABILITY: PHYSICAL HEALTH: EXERCISE ACTIVITY: LAQ

## 2023-10-30 SDOH — HEALTH STABILITY: PHYSICAL HEALTH: EXERCISE ACTIVITIES SETS: 2

## 2023-10-30 SDOH — HEALTH STABILITY: PHYSICAL HEALTH: EXERCISE COMMENTS: AAROM NEEDED FOR LAQ, SAQ, SLR, AND HEELSIDES

## 2023-10-30 SDOH — HEALTH STABILITY: PHYSICAL HEALTH: EXERCISE TYPE: LEFT TKA REHAB

## 2023-10-30 SDOH — HEALTH STABILITY: PHYSICAL HEALTH: PHYSICAL EXERCISE: 10

## 2023-10-30 SDOH — HEALTH STABILITY: PHYSICAL HEALTH: EXERCISE ACTIVITY: SEATED HEELSLIDES

## 2023-10-30 SDOH — HEALTH STABILITY: PHYSICAL HEALTH: EXERCISE ACTIVITIES SETS: 1

## 2023-10-30 SDOH — HEALTH STABILITY: PHYSICAL HEALTH: PHYSICAL EXERCISE: 5

## 2023-10-30 SDOH — HEALTH STABILITY: PHYSICAL HEALTH: EXERCISE ACTIVITY: KNEE FLEXION STRETCH ON 6 INCH STEP

## 2023-10-30 SDOH — HEALTH STABILITY: PHYSICAL HEALTH: EXERCISE ACTIVITY: QS

## 2023-10-30 SDOH — HEALTH STABILITY: PHYSICAL HEALTH: EXERCISE ACTIVITY: HEELSLIDES

## 2023-10-30 SDOH — HEALTH STABILITY: PHYSICAL HEALTH: EXERCISE ACTIVITY: SAQ

## 2023-10-30 ASSESSMENT — ENCOUNTER SYMPTOMS
PAIN LOCATION: LEFT KNEE
PAIN LOCATION - PAIN QUALITY: ACHE
PAIN LOCATION - PAIN FREQUENCY: CONSTANT
SUBJECTIVE PAIN PROGRESSION: WAXING AND WANING
PAIN SEVERITY GOAL: 0/10
HIGHEST PAIN SEVERITY IN PAST 24 HOURS: 8/10
PAIN LOCATION - PAIN SEVERITY: 7/10
LOWEST PAIN SEVERITY IN PAST 24 HOURS: 3/10
PAIN: 1
MUSCLE WEAKNESS: 1
PAIN LOCATION - EXACERBATING FACTORS: MOVEMENT
PERSON REPORTING PAIN: PATIENT
LIMITED RANGE OF MOTION: 1

## 2023-10-30 NOTE — TELEPHONE ENCOUNTER
Pt called for pain medicine. DOS 10/23/23. Rates pain a 6/10. I have personally reviewed the OARRS report for the patient, no issues identified. This report is scanned into the EMR. I have considered the risks of abuse, dependence, addiction, and diversion. The 7 day Rx sent to pharmacy on file. NIHARIKA JUSTIN

## 2023-10-31 PROCEDURE — 1090000001 HH PPS REVENUE CREDIT

## 2023-10-31 PROCEDURE — 1090000002 HH PPS REVENUE DEBIT

## 2023-11-01 PROCEDURE — 1090000001 HH PPS REVENUE CREDIT

## 2023-11-01 PROCEDURE — 1090000002 HH PPS REVENUE DEBIT

## 2023-11-02 DIAGNOSIS — G89.18 ACUTE POST-OPERATIVE PAIN: Primary | ICD-10-CM

## 2023-11-02 PROCEDURE — 1090000002 HH PPS REVENUE DEBIT

## 2023-11-02 PROCEDURE — 1090000001 HH PPS REVENUE CREDIT

## 2023-11-02 RX ORDER — OXYCODONE HYDROCHLORIDE 5 MG/1
5 TABLET ORAL EVERY 6 HOURS PRN
Qty: 28 TABLET | Refills: 0 | Status: SHIPPED | OUTPATIENT
Start: 2023-11-02 | End: 2023-11-09

## 2023-11-02 RX ORDER — TRAMADOL HYDROCHLORIDE 50 MG/1
50 TABLET ORAL EVERY 6 HOURS PRN
Qty: 28 TABLET | Refills: 0 | Status: SHIPPED | OUTPATIENT
Start: 2023-11-02 | End: 2023-11-09

## 2023-11-02 NOTE — PROGRESS NOTES
Due to the nature of the surgery and the necessary post-operative rehabilitation, the patient will require more than 30 morphine MEQ per day for 7 days. The patient occasionally patient rates his pain a 9 or 10 on the pain scale.  I have personally reviewed the OARRS report for this patient. This report is scanned into the electronic medical record. I have considered the risks of abuse, dependence, addiction and diversion. The patient does not exhibit any of the signs of above.

## 2023-11-03 ENCOUNTER — HOME CARE VISIT (OUTPATIENT)
Dept: HOME HEALTH SERVICES | Facility: HOME HEALTH | Age: 68
End: 2023-11-03
Payer: MEDICARE

## 2023-11-03 PROCEDURE — 1090000001 HH PPS REVENUE CREDIT

## 2023-11-03 PROCEDURE — G0157 HHC PT ASSISTANT EA 15: HCPCS | Mod: HHH

## 2023-11-03 PROCEDURE — 1090000002 HH PPS REVENUE DEBIT

## 2023-11-03 SDOH — HEALTH STABILITY: PHYSICAL HEALTH: PHYSICAL EXERCISE: SUPINE

## 2023-11-03 SDOH — HEALTH STABILITY: PHYSICAL HEALTH: EXERCISE ACTIVITY: KNEE FLEXION STRETCH WITH 6 INCH, 12 INCH STEP

## 2023-11-03 SDOH — HEALTH STABILITY: PHYSICAL HEALTH: EXERCISE ACTIVITY: KNEE FLEXION

## 2023-11-03 SDOH — HEALTH STABILITY: PHYSICAL HEALTH: PHYSICAL EXERCISE: STANDING

## 2023-11-03 SDOH — HEALTH STABILITY: PHYSICAL HEALTH: PHYSICAL EXERCISE: 10

## 2023-11-03 SDOH — HEALTH STABILITY: PHYSICAL HEALTH: EXERCISE ACTIVITIES SETS: 2

## 2023-11-03 SDOH — HEALTH STABILITY: PHYSICAL HEALTH: EXERCISE ACTIVITY: SAQ

## 2023-11-03 SDOH — HEALTH STABILITY: PHYSICAL HEALTH: PHYSICAL EXERCISE: SEATED

## 2023-11-03 SDOH — HEALTH STABILITY: PHYSICAL HEALTH: EXERCISE ACTIVITIES SETS: 1

## 2023-11-03 SDOH — HEALTH STABILITY: PHYSICAL HEALTH: EXERCISE ACTIVITY: STAND TO SIT

## 2023-11-03 SDOH — HEALTH STABILITY: PHYSICAL HEALTH: EXERCISE COMMENTS: AAROM DURING HEEL SLIDES, SAQ AND LAQ.

## 2023-11-03 SDOH — HEALTH STABILITY: PHYSICAL HEALTH: PHYSICAL EXERCISE: SUPINE/SEATED

## 2023-11-03 SDOH — HEALTH STABILITY: PHYSICAL HEALTH: EXERCISE ACTIVITY: SLR

## 2023-11-03 SDOH — HEALTH STABILITY: PHYSICAL HEALTH: EXERCISE ACTIVITY: LAQ

## 2023-11-03 SDOH — HEALTH STABILITY: PHYSICAL HEALTH: EXERCISE ACTIVITY: MARCHING LLE

## 2023-11-03 SDOH — HEALTH STABILITY: PHYSICAL HEALTH: EXERCISE ACTIVITY: QS WITH HEEL PROP

## 2023-11-03 SDOH — HEALTH STABILITY: PHYSICAL HEALTH: EXERCISE ACTIVITY: HEEL SLIDES

## 2023-11-03 ASSESSMENT — ENCOUNTER SYMPTOMS
MUSCLE WEAKNESS: 1
PAIN SEVERITY GOAL: 0/10
PAIN LOCATION - RELIEVING FACTORS: PAIN MEDICATION
PAIN LOCATION - PAIN QUALITY: ACHE
PAIN LOCATION - PAIN FREQUENCY: CONSTANT
HIGHEST PAIN SEVERITY IN PAST 24 HOURS: 7/10
SUBJECTIVE PAIN PROGRESSION: GRADUALLY IMPROVING
PAIN LOCATION - PAIN SEVERITY: 2/10
PAIN LOCATION: LEFT KNEE
LIMITED RANGE OF MOTION: 1
LOWEST PAIN SEVERITY IN PAST 24 HOURS: 2/10

## 2023-11-03 ASSESSMENT — ACTIVITIES OF DAILY LIVING (ADL): AMBULATION ASSISTANCE ON FLAT SURFACES: 1

## 2023-11-03 ASSESSMENT — PAIN DESCRIPTION - PAIN TYPE: TYPE: SURGICAL PAIN

## 2023-11-04 PROCEDURE — 1090000002 HH PPS REVENUE DEBIT

## 2023-11-04 PROCEDURE — 1090000001 HH PPS REVENUE CREDIT

## 2023-11-05 PROCEDURE — 1090000001 HH PPS REVENUE CREDIT

## 2023-11-05 PROCEDURE — 1090000002 HH PPS REVENUE DEBIT

## 2023-11-06 PROCEDURE — 1090000001 HH PPS REVENUE CREDIT

## 2023-11-06 PROCEDURE — 1090000002 HH PPS REVENUE DEBIT

## 2023-11-07 ENCOUNTER — HOME CARE VISIT (OUTPATIENT)
Dept: HOME HEALTH SERVICES | Facility: HOME HEALTH | Age: 68
End: 2023-11-07
Payer: MEDICARE

## 2023-11-07 VITALS
SYSTOLIC BLOOD PRESSURE: 120 MMHG | TEMPERATURE: 98.1 F | RESPIRATION RATE: 18 BRPM | DIASTOLIC BLOOD PRESSURE: 75 MMHG | HEART RATE: 81 BPM

## 2023-11-07 PROCEDURE — 1090000001 HH PPS REVENUE CREDIT

## 2023-11-07 PROCEDURE — 1090000002 HH PPS REVENUE DEBIT

## 2023-11-07 PROCEDURE — G0151 HHCP-SERV OF PT,EA 15 MIN: HCPCS | Mod: HHH

## 2023-11-07 ASSESSMENT — BALANCE ASSESSMENTS
TURNING 360 DEGREES STEPS: 1 - CONTINUOUS STEPS
BALANCE SCORE: 15
SITTING DOWN: 2 - SAFE, SMOOTH MOTION
ARISES: 1 - ABLE, USES ARMS TO HELP
ATTEMPTS TO ARISE: 2 - ABLE TO RISE, ONE ATTEMPT
SITTING BALANCE: 1 - STEADY, SAFE
EYES CLOSED AT MAXIMUM POSITION NUDGED: 1 - STEADY
ARISING SCORE: 1
STANDING BALANCE: 2 - NARROW STANCE WITHOUT SUPPORT
NUDGED SCORE: 2
IMMEDIATE STANDING BALANCE FIRST 5 SECONDS: 2 - STEADY WITHOUT WALKER OR OTHER SUPPORT
NUDGED: 2 - STEADY

## 2023-11-07 ASSESSMENT — GAIT ASSESSMENTS
PATH SCORE: 2
WALKING STANCE: 1 - HEELS ALMOST TOUCHING WHILE WALKING
GAIT SCORE: 12
STEP SYMMETRY: 1 - RIGHT AND LEFT STEP LENGTH APPEAR EQUAL
BALANCE AND GAIT SCORE: 27
INITIATION OF GAIT IMMEDIATELY AFTER GO: 1 - NO HESITANCY
STEP CONTINUITY: 1 - STEPS APPEAR CONTINUOUS
PATH: 2 - STRAIGHT WITHOUT WALKING AID
TRUNK: 2 - NO SWAY, NO FLEXION, NO USE OF ARMS, NO WALKING AID
TRUNK SCORE: 2

## 2023-11-07 ASSESSMENT — ACTIVITIES OF DAILY LIVING (ADL)
HOME_HEALTH_OASIS: 00
OASIS_M1830: 01

## 2023-11-07 ASSESSMENT — ENCOUNTER SYMPTOMS
PAIN: 1
PERSON REPORTING PAIN: PATIENT
SUBJECTIVE PAIN PROGRESSION: GRADUALLY IMPROVING
PAIN LOCATION: LEFT KNEE
PAIN LOCATION - PAIN SEVERITY: 1/10
HIGHEST PAIN SEVERITY IN PAST 24 HOURS: 3/10
LOWEST PAIN SEVERITY IN PAST 24 HOURS: 0/10
PAIN SEVERITY GOAL: 0/10

## 2023-11-07 NOTE — HOME HEALTH
S: PT reassessment for discharge. Pt being seen for decreased mobility related to L TKA.    B: Pt reports he is still taking pain meds and still has difficulty sleeping at night, but feels his mobility has improved a lot. HE reports no falls, no change in medications, and rates current pain level at 1/10. Pt has pictoral handouts for HEP and has been performing daily. He is scheduled to begin outpatient PT this Thursday.    A: Pt demonstrates good improvement in functional mobility as illustrated by improved gait technique and independence and improved Tinetti score from 13/28 on SOC to 27/28 today. Pt ambulates safely and independently without assistive device within the house including navigating stairs using 1 railing demonstrating appropriate step to pattern. Pt instructed in and performed forward step ups onto bottom stair using 2 hand support 10x on L LE with good return demo and good understanding to add to HEP. Pt is comfortable and independent with HEP and is appropriate for discharge from home health today and transition to outpatient PT this Thursday.    P: Pt has met goals and is in agreement with discharge from home health PT today.

## 2023-11-07 NOTE — Clinical Note
Pt discharged from home health PT today. Doing very well with L Knee flexion at 100 degrees and full extension. He starts outpatient PT this Thursday

## 2023-11-08 PROCEDURE — G0180 MD CERTIFICATION HHA PATIENT: HCPCS | Performed by: ORTHOPAEDIC SURGERY

## 2023-11-09 ENCOUNTER — EVALUATION (OUTPATIENT)
Dept: PHYSICAL THERAPY | Facility: CLINIC | Age: 68
End: 2023-11-09
Payer: MEDICARE

## 2023-11-09 DIAGNOSIS — M17.12 PRIMARY LOCALIZED OSTEOARTHRITIS OF LEFT KNEE: ICD-10-CM

## 2023-11-09 DIAGNOSIS — M25.562 LEFT KNEE PAIN: Primary | ICD-10-CM

## 2023-11-09 PROCEDURE — 97161 PT EVAL LOW COMPLEX 20 MIN: CPT | Mod: GP

## 2023-11-09 PROCEDURE — 97110 THERAPEUTIC EXERCISES: CPT | Mod: GP

## 2023-11-09 ASSESSMENT — PAIN SCALES - GENERAL: PAINLEVEL_OUTOF10: 1

## 2023-11-09 NOTE — Clinical Note
November 9, 2023     Patient: Mohit Villatoro   YOB: 1955   Date of Visit: 11/9/2023       To Whom it May Concern:    Mohit Villatoro was seen in my clinic on 11/9/2023. He {Return to school/sport:63677}.    If you have any questions or concerns, please don't hesitate to call.         Sincerely,          Dede Arreguin, PT        CC: No Recipients

## 2023-11-09 NOTE — PROGRESS NOTES
Physical Therapy  Physical Therapy Orthopedic Evaluation    Patient Name: Mohit Villatoro  MRN: [id number]  Today's Date: 11/9/2023  Time Calculation  Start Time: 0745  Stop Time: 0830  Time Calculation (min): 45 min    Insurance:  Visit number: 1  [place]ved [place] of visits:  [place]  Insurance: Medicare   Medicare cert date 11/9/23  to 2/1/24    Current Problem  1. Left knee pain  Follow Up In Physical Therapy      2. Primary localized osteoarthritis of left knee  Referral to Physical Therapy          General  Reason for Referral: TKA  Referred By: felisha  Precautions  Precautions  Precautions Comment: none  Pain  Pain Score: 1    Subjective:   Subjective   Chief Complaint: L TKA    Onset: 10/23/23  ISAAC: knee OA    Current Condition:   better   PAIN  increases pain/difficulty:  bending, long distance walking, stairs reciprocally, sleeping  decrease pain/:  icing,     Intensity (0-10): current 1, on a bad day 4, on a good day 1  Location: anterior knee  Description: discomfort    Relevant Information (PMH & Previous Tests/Imaging): PT in home  Previous Interventions/Treatments: PT in home  Current level of function/exercise: bike ride, swimming, weight traning  Work status: desk work, computer    Living situation   - house   - lives with older sons    Personal factors Impacting care:   - language: eng    Objective:  Objective       LE strength  Hip flexion 5/4  Hip abd 5/5  Hip add 5/5  Quads 5/4  HS 5/4+  DF 5/5  PF  5/5    Knee ROM  L 1-113    circumferential measurements knee in cm  above knee  38/41.5  at joint line 37/40  below knee 31.5/33.5  Amb standard cane, lacking fluid swing phase L     Scar covered in steri-strips healing mild scabbing and moderate swelling.    Outcome Measures:  Other Measures  Lower Extremity Funtional Score (LEFS): 41     EDUCATION: home exercise program, plan of care, activity modifications, pain management, and injury pathology     Access Code: [email or web address]  URL:  [email or web address]  Date: 11/09/2023  Prepared by: Dede Arreguin    Exercises  - Long Sitting Calf Stretch with Strap  - 1 x daily - 7 x weekly - 2-3 reps - 30-60 seconds hold  - Supine Bridge  - 1 x daily - 7 x weekly - 2-3 sets - 10 reps - 2-5 seconds hold  - Seated Table Hamstring Stretch  - 1 x daily - 7 x weekly - 2-3 reps - 30-60 seconds. hold  - Standing Quad Stretch with Table and Chair Support  - 1 x daily - 7 x weekly - 3 sets - 30-60 hold  - Hip Flexor Stretch at Edge of Bed  - 1 x daily - 7 x weekly - 2-3 reps - 30-60 seconds hold  Bike 5 min  Assessment: Patient is a 66 yo m s/p L TKA 10/23/23.   Pt presents with signs and symptoms consistent with post op procedure, resulting in limited participation in pain-free ADLs and inability to perform at their prior level of function. Pt would benefit from physical therapy to address the impairments found & listed previously in the objective section in order to return to safe and pain-free ADLs and prior level of function.       Plan:      Planned Interventions include: therapeutic exercise, self-care home management, manual therapy, therapeutic activities, gait training, neuromuscular coordination, aquatic therapy  Frequency: 2  Duration: 6-8 weeks    Goals:  R Knee Pain, B Hip Pain Problems       R Knee Pain, B Hip Pain Problems (Resolved)       PT Problem       PT Goal 1 (Adequate for Discharge)       Start:  09/20/23    Expected End:  10/04/23    Resolved:  11/09/23    Patient demonstrate home exercise program adherence to supplement symptom reduction and functional gains made in clinic in 3 weeks         PT Goal 2 (Adequate for Discharge)       Start:  09/20/23    Expected End:  10/18/23    Resolved:  11/09/23    Patient will verbalize pain (0-10) <0/10 at best, and <3/10 at worst to improve ADL tolerance in 8 weeks   Patient will demonstrate pain-free hip and knee ROM WFL to improve joint mechanics during ADLs in 8 weeks  Patient  will demonstrate gross L hip/knee strength 4/5 MMT to improve ease with functional mobility in 8 weeks  Patient will perform DL squat with appropriate LE alignment with minimal symptoms to improve ease with transfers in 8 weeks  Pt will demonstrate MCID (9 points) improvement on LEFS score (>52 points) to demonstrate improved functional ability in 8 weeks  Pt will report 75% improvement in activity tolerance to assist in return to PLOF in 8 weeks              TKA Problems       TKA Problems (Active)       PT Problem       PT Goal 1       Start:  11/09/23    Expected End:  11/24/23       [place] in HEP by 2 weeks  Reciprocal stairs with ease by 4 weeks         PT Goal 2       Start:  11/09/23    Expected End:  12/29/23         Goal Note       Knee ROM 0-125 L by 6 weeks  MMT 5/5 by 6-8  weeks  Resume working out at gym by 6 weeks    Reports sleeping through the night by 4-6 weeks  Improved LEFS by 10 points by 6-8 weeks

## 2023-11-09 NOTE — Clinical Note
November 9, 2023     Patient: Mohit Villatoro   YOB: 1955   Date of Visit: 11/9/2023       To Whom It May Concern:    It is my medical opinion that Mohit Villatoro {Work release (duty restriction):68500}.    If you have any questions or concerns, please don't hesitate to call.         Sincerely,        Dede Arreguin, PT    CC: No Recipients

## 2023-11-16 ENCOUNTER — TREATMENT (OUTPATIENT)
Dept: PHYSICAL THERAPY | Facility: CLINIC | Age: 68
End: 2023-11-16
Payer: MEDICARE

## 2023-11-16 DIAGNOSIS — M25.562 LEFT KNEE PAIN: ICD-10-CM

## 2023-11-16 PROCEDURE — 97110 THERAPEUTIC EXERCISES: CPT | Mod: GP

## 2023-11-16 PROCEDURE — 97112 NEUROMUSCULAR REEDUCATION: CPT | Mod: GP

## 2023-11-16 ASSESSMENT — PAIN SCALES - GENERAL: PAINLEVEL_OUTOF10: 0 - NO PAIN

## 2023-11-16 NOTE — PROGRESS NOTES
Physical Therapy Treatment    Patient Name: Mohit Villatoro  MRN: [id number]  Today's Date: 11/16/2023  Time Calculation  Start Time: 0745  Stop Time: 0830  Time Calculation (min): 45 min    Insurance:   Visit number: 2  Approved number of visits:  [place]  Insurance: Medicare   Medicare cert date 11/9/23  to 2/1/24    Current Problem  1. Left knee pain  Follow Up In Physical Therapy          General  Reason for Referral: TKA  Referred By: felisha  Precautions  Precautions  Precautions Comment: none  Pain  Pain Score: 0 - No pain    Subjective:   Subjective   Patient reports knee very stiff today.      Compliant with HEP? Yes     Objective:   Objective     121  Treatments:   Bike 5 min  Bosu lunges 20 x B  Step up 6 inch 15 x L  Retro step 6 inch 20 x L  Valslide groin 15 x 2 way B  Tandem stance kb pass cw & ccw 20 x R/L, L/R  Sit-stand 4 kg 12 x 2  SB bridging 15 x  SB HSC 10 x 2   Prone quad stretch 1 min x 3 L  Incline stretch 1 min  SS HS stretch 1 min       Assessment: tolerated very well.  Improved ROM.  Balance work challenging.       Plan: continue general strengthening flexibility, ROM

## 2023-11-24 ENCOUNTER — TREATMENT (OUTPATIENT)
Dept: PHYSICAL THERAPY | Facility: CLINIC | Age: 68
End: 2023-11-24
Payer: MEDICARE

## 2023-11-24 DIAGNOSIS — M25.562 LEFT KNEE PAIN: Primary | ICD-10-CM

## 2023-11-24 PROCEDURE — 97112 NEUROMUSCULAR REEDUCATION: CPT | Mod: GP,CQ | Performed by: SPECIALIST/TECHNOLOGIST

## 2023-11-24 PROCEDURE — 97110 THERAPEUTIC EXERCISES: CPT | Mod: GP,CQ | Performed by: SPECIALIST/TECHNOLOGIST

## 2023-11-24 PROCEDURE — 97016 VASOPNEUMATIC DEVICE THERAPY: CPT | Mod: GP,CQ | Performed by: SPECIALIST/TECHNOLOGIST

## 2023-11-24 ASSESSMENT — PAIN SCALES - GENERAL: PAINLEVEL_OUTOF10: 1

## 2023-11-24 ASSESSMENT — PAIN - FUNCTIONAL ASSESSMENT: PAIN_FUNCTIONAL_ASSESSMENT: 0-10

## 2023-11-24 NOTE — PROGRESS NOTES
Physical Therapy Treatment    Patient Name: Mohit Villatoro  MRN: [id number]  Today's Date: 11/24/2023  Time Calculation  Start Time: 0807  Stop Time: 0901  Time Calculation (min): 54 min    Insurance:   Visit number: 3  Approved number of visits:  [place]  Insurance: Medicare   Medicare cert date 11/9/23  to 2/1/24    Current Problem  1. Left knee pain  Follow Up In Physical Therapy        Precautions  Precautions  Precautions Comment: none  Pain  Pain Assessment: 0-10  Pain Score: 1    Subjective:   Subjective   Patient arrived full weight bearing with a slight limp noting discomfort rather than pain.  Patient is able to reciprocate on stairs.     Compliant with HEP? Yes     Objective:   Objective   Effusion jt line: 3.0 cm   Suprapatellar edema: 0.5 cm   Knee AROM Pre: 0°-118°  post 0°-122°    Patellar mobility: ML 03, IS 02, tilt 01    TTP: L proximal rectus    Treatments:   Stepper L2 5 min   Airex MIP 2'   DBE 2x2 min   4 kg KB Tandem & swing cw/ccw 10x R/L   Step up 6 inch 15 x L  Hip ext R/L 55#/55# 20x   Leg Press 50# 2x20x  Hams 30# 2x20x  Incline stretch 1 min  SS HS stretch 1 min   Heel slides 2 min   Game ready 10 min medium L knee     Charges: TE x2, NME, Vaso (cq)     Assessment: Patient tolerated intensity with mild fatigue noting good effort and improving AROM by end of session.        Plan: Continue to improve ROM, strength, flexibility and balance to improve gait and ADL

## 2023-11-27 ENCOUNTER — TREATMENT (OUTPATIENT)
Dept: PHYSICAL THERAPY | Facility: CLINIC | Age: 68
End: 2023-11-27
Payer: MEDICARE

## 2023-11-27 DIAGNOSIS — M25.562 LEFT KNEE PAIN: ICD-10-CM

## 2023-11-27 PROCEDURE — 97112 NEUROMUSCULAR REEDUCATION: CPT | Mod: GP

## 2023-11-27 PROCEDURE — 97110 THERAPEUTIC EXERCISES: CPT | Mod: GP

## 2023-11-27 ASSESSMENT — PAIN SCALES - GENERAL: PAINLEVEL_OUTOF10: 1

## 2023-11-27 NOTE — PROGRESS NOTES
Physical Therapy Treatment    Patient Name: Mohit Villatoro  MRN: [id number]  Today's Date: 11/27/2023  Time Calculation  Start Time: 1003  Stop Time: 1049  Time Calculation (min): 46 min    Insurance:   Visit number: 4  Approved number of visits:  [place]  Insurance: Medicare   Medicare cert date 11/9/23  to 2/1/24    Current Problem  1. Left knee pain  Follow Up In Physical Therapy          General  Reason for Referral: TKA  Referred By: felisha  Precautions  Precautions  Precautions Comment: none  Pain  Pain Score: 1    Subjective:   Subjective   Patient reports knee still about a 1, still having problems sleeping.  Last night able to sleep from     Compliant with HEP? yes    Objective:   Objective   0-125  Trendelenburg gait uncompensated    Treatments:   Bike 5 min  Valslide 3 way 12 x L  Bosu step ups 30 x L  Bosu squats 20 x   SLS L 1 min L  Toe walk/heel walk 2 x around gym  Sit-stand 8kg 12 x 2   1/2 foam rebounder toss 25 x 2 way  Prone quad stretch 1 min x 2 L  Leg press 90# 12 x 3    Assessment:  challenged with balance exercises.  Noted trendelenburg gait upon leaving.         Plan: glut strengthening

## 2023-11-29 ENCOUNTER — TELEPHONE (OUTPATIENT)
Dept: PRIMARY CARE | Facility: CLINIC | Age: 68
End: 2023-11-29
Payer: MEDICARE

## 2023-11-29 DIAGNOSIS — K21.9 GASTROESOPHAGEAL REFLUX DISEASE, UNSPECIFIED WHETHER ESOPHAGITIS PRESENT: ICD-10-CM

## 2023-11-29 DIAGNOSIS — E78.00 ELEVATED LDL CHOLESTEROL LEVEL: Primary | ICD-10-CM

## 2023-11-29 DIAGNOSIS — Z12.5 PROSTATE CANCER SCREENING: ICD-10-CM

## 2023-11-29 NOTE — TELEPHONE ENCOUNTER
Patient called and he has an annual exam coming up and wanted to have his lab work done  Prior.  He wanted to know if you could put an order in the system for him?    [phone]

## 2023-11-30 ENCOUNTER — TREATMENT (OUTPATIENT)
Dept: PHYSICAL THERAPY | Facility: CLINIC | Age: 68
End: 2023-11-30
Payer: MEDICARE

## 2023-11-30 DIAGNOSIS — M25.562 LEFT KNEE PAIN: ICD-10-CM

## 2023-11-30 PROCEDURE — 97112 NEUROMUSCULAR REEDUCATION: CPT | Mod: GP,CQ | Performed by: SPECIALIST/TECHNOLOGIST

## 2023-11-30 PROCEDURE — 97110 THERAPEUTIC EXERCISES: CPT | Mod: GP,CQ | Performed by: SPECIALIST/TECHNOLOGIST

## 2023-11-30 ASSESSMENT — PAIN - FUNCTIONAL ASSESSMENT: PAIN_FUNCTIONAL_ASSESSMENT: 0-10

## 2023-11-30 NOTE — PROGRESS NOTES
"Physical Therapy Treatment    Patient Name: Mohit Villatoro  MRN: [id number]  Today's Date: 11/30/2023  Time Calculation  Start Time: 1115  Stop Time: 1159  Time Calculation (min): 44 min    Insurance:   Visit number: 5  Approved number of visits:  [place]  Insurance: Medicare   Medicare cert date 11/9/23  to 2/1/24    Current Problem  1. Left knee pain  Follow Up In Physical Therapy          General  Reason for Referral: TKA  Referred By: felisha  Precautions  Precautions  Precautions Comment: none  Pain  Pain Assessment: 0-10    Subjective:   Subjective   Patient reports knee pain 3 of 10 yesterday and returned to 1 of 10 today.  Patient notes much more soreness after last session for unknown reason.     Compliant with HEP? yes    Objective:   Objective   Knee effusion 1.0 cm jt line  Knee AROM pre 0°-122° post   Trendelenburg gait uncompensated    Treatments:   Bike 5 min  Airex MIP 2'   DBE 2x2 min   Red TB side steps // bars 2 laps   Bosu lunges 10x   6\" step ups 10x R/L   Hip ext R/L 55#/55# 20x   Leg press 70# 2x20   Hams 35# 20x  SS hams R/L 1'       Assessment:  Patient noted good effort without increased symptoms today.  Omitted some of the SL work to decrease pain the day after the session.     Plan: glut strengthening. Continue to improve ROM, strength, flexibility and balance to improve gait and ADL      "

## 2023-12-01 ENCOUNTER — OFFICE VISIT (OUTPATIENT)
Dept: PRIMARY CARE | Facility: CLINIC | Age: 68
End: 2023-12-01
Payer: MEDICARE

## 2023-12-01 VITALS
SYSTOLIC BLOOD PRESSURE: 122 MMHG | WEIGHT: 185 LBS | BODY MASS INDEX: 25.9 KG/M2 | HEIGHT: 71 IN | DIASTOLIC BLOOD PRESSURE: 76 MMHG

## 2023-12-01 DIAGNOSIS — R79.9 ABNORMAL BLOOD CHEMISTRY: ICD-10-CM

## 2023-12-01 DIAGNOSIS — G47.9 SLEEP DISTURBANCE: Primary | ICD-10-CM

## 2023-12-01 DIAGNOSIS — Z00.00 MEDICARE ANNUAL WELLNESS VISIT, SUBSEQUENT: ICD-10-CM

## 2023-12-01 PROCEDURE — 1160F RVW MEDS BY RX/DR IN RCRD: CPT | Performed by: INTERNAL MEDICINE

## 2023-12-01 PROCEDURE — 1125F AMNT PAIN NOTED PAIN PRSNT: CPT | Performed by: INTERNAL MEDICINE

## 2023-12-01 PROCEDURE — 1036F TOBACCO NON-USER: CPT | Performed by: INTERNAL MEDICINE

## 2023-12-01 PROCEDURE — G0439 PPPS, SUBSEQ VISIT: HCPCS | Performed by: INTERNAL MEDICINE

## 2023-12-01 PROCEDURE — 99213 OFFICE O/P EST LOW 20 MIN: CPT | Performed by: INTERNAL MEDICINE

## 2023-12-01 PROCEDURE — 1170F FXNL STATUS ASSESSED: CPT | Performed by: INTERNAL MEDICINE

## 2023-12-01 PROCEDURE — 1159F MED LIST DOCD IN RCRD: CPT | Performed by: INTERNAL MEDICINE

## 2023-12-01 PROCEDURE — 1157F ADVNC CARE PLAN IN RCRD: CPT | Performed by: INTERNAL MEDICINE

## 2023-12-01 RX ORDER — TRAZODONE HYDROCHLORIDE 100 MG/1
100 TABLET ORAL NIGHTLY
Qty: 90 TABLET | Refills: 2 | Status: SHIPPED | OUTPATIENT
Start: 2023-12-01

## 2023-12-01 SDOH — HEALTH STABILITY: PHYSICAL HEALTH

## 2023-12-01 ASSESSMENT — ACTIVITIES OF DAILY LIVING (ADL)
MANAGING_FINANCES: INDEPENDENT
DRESSING: INDEPENDENT
BATHING: INDEPENDENT
DOING_HOUSEWORK: INDEPENDENT
TAKING_MEDICATION: INDEPENDENT
GROCERY_SHOPPING: INDEPENDENT

## 2023-12-01 ASSESSMENT — ENCOUNTER SYMPTOMS
OCCASIONAL FEELINGS OF UNSTEADINESS: 0
LOSS OF SENSATION IN FEET: 0
DEPRESSION: 0

## 2023-12-01 ASSESSMENT — PATIENT HEALTH QUESTIONNAIRE - PHQ9
SUM OF ALL RESPONSES TO PHQ9 QUESTIONS 1 AND 2: 0
2. FEELING DOWN, DEPRESSED OR HOPELESS: NOT AT ALL
10. IF YOU CHECKED OFF ANY PROBLEMS, HOW DIFFICULT HAVE THESE PROBLEMS MADE IT FOR YOU TO DO YOUR WORK, TAKE CARE OF THINGS AT HOME, OR GET ALONG WITH OTHER PEOPLE: SOMEWHAT DIFFICULT
SUM OF ALL RESPONSES TO PHQ9 QUESTIONS 1 AND 2: 2
1. LITTLE INTEREST OR PLEASURE IN DOING THINGS: SEVERAL DAYS
2. FEELING DOWN, DEPRESSED OR HOPELESS: SEVERAL DAYS
1. LITTLE INTEREST OR PLEASURE IN DOING THINGS: NOT AT ALL

## 2023-12-01 ASSESSMENT — LIFESTYLE VARIABLES: HOW OFTEN DO YOU HAVE A DRINK CONTAINING ALCOHOL: 2-3 TIMES A WEEK

## 2023-12-01 NOTE — PROGRESS NOTES
Subjective   Patient ID: oMhit Villatoro is a 68 y.o. male who presents for Annual Exam.    HPI  Patient comes in for a physical exam last one done over a year ago , doing well over-all with no particular complaints. Also is in for laboratory review and health maintenance update.  Updating family history as well.  Interval event - past medical history, surgical, social, and family history reviewed and updated.  Interval care -  Patient is    up to date with dental care.  Patient does     receive routine vision care.    Review of Systems  General: Denies fever, chills, night sweats, changes in appetite or weight  ENT: Negative for ear pain, hearing loss, headache, difficulty swallowing, up to date with dental checks   Eyes: Negative for recent visual changes, up to date with eye exams  Dermatologic: Negative for new skin conditions, rash  Respiratory: Negative for paroxysmal nocturnal dyspnea, wheezing,shortness of breath, cough  Cardiovascular: Negative for chest pain, palpitations, or leg swelling  Gastrointestinal: Negative for nausea/vomiting, abdominal pain, changes in bowel habits  Genitourinary: Negative for dysuria, urgency, frequency  URINARY INCONTINENCE   Neurological: Negative for headaches, tremors, dizziness, memory loss, confusion, weakness, paresthesias  Psychiatric: Negative for sleep problems, anxiety, depression, conditions are stable  Endocrine: Negative for heat or cold intolerance, polyuria, polydipsia  Other:All systems have been reviewed and are negative except as previously noted.    Previous history  Past Medical History:   Diagnosis Date    Abnormal finding of blood chemistry, unspecified 11/30/2022    Abnormal blood chemistry    Adjustment disorder, unspecified 12/17/2021    Adult situational stress disorder    Allergic rhinitis, unspecified 09/01/2017    Allergic rhinitis    Benign prostatic hyperplasia without lower urinary tract symptoms 12/16/2022    BPH (benign prostatic hyperplasia)     Encounter for general adult medical examination without abnormal findings 11/30/2022    Welcome to Medicare preventive visit    Encounter for screening for malignant neoplasm of prostate 10/12/2022    Screening for prostate cancer    Nocturia 09/01/2017    Nocturia    Other abnormality of red blood cells 10/12/2022    Elevated MCV    Pain in right knee 01/05/2023    Bilateral knee pain    Poor urinary stream 12/16/2022    Weak urine stream    Pure hypercholesterolemia, unspecified 10/12/2022    Elevated LDL cholesterol level    Sleep disorder, unspecified 04/09/2021    Sleep disturbance    Unspecified abnormal findings in urine 09/10/2020    Urine abnormality    Unspecified skin changes 04/26/2021    Skin change     Past Surgical History:   Procedure Laterality Date    LASER OF PROSTATE W/ GREEN LIGHT PVP      Holmium laser of prostate(not green light)    OTHER SURGICAL HISTORY  11/27/2018    Hernia repair     Social History     Tobacco Use    Smoking status: Former     Packs/day: 0.50     Years: 20.00     Additional pack years: 0.00     Total pack years: 10.00     Types: Cigarettes    Smokeless tobacco: Never    Tobacco comments:     Quit smoking cigarettes 1997, smoked 0.5 ppd x 20 yrs   Vaping Use    Vaping Use: Never used   Substance Use Topics    Alcohol use: Yes     Alcohol/week: 4.0 standard drinks of alcohol     Types: 4 Glasses of wine per week     Comment: occasionally    Drug use: Never     Family History   Problem Relation Name Age of Onset    Hypertension Mother      Diabetes Sister      Other (cardiac disorder) Other      Other (allergy) Other      Hypertension Other       Allergies   Allergen Reactions    Fluoxetine Hcl Unknown     sex dysfunction     Current Outpatient Medications   Medication Instructions    buPROPion SR (WELLBUTRIN SR) 100 mg, oral, 2 times daily, Do not crush, chew, or split.    meloxicam (MOBIC) 15 mg, oral, Daily, Take with food.    pantoprazole (PROTONIX) 40 mg, oral, Daily  before breakfast, Do not crush, chew, or split.    tadalafil (Cialis) 10 mg tablet 1 tablet, oral, As needed    traZODone (Desyrel) 50 mg tablet 1 tablet, oral, Nightly       Objective       Physical Exam  Vital Signs: as recorded above  General: Well groomed, well nourished   Orientation:  Alert , oriented to time, place , and person   Mood and Affect:  Cooperative , no apparent distress normal affect  Skin: Good color, good turgor  Eyes: Extra ocular muscle movements intact, anicteric sclerae  Neck: Supple, full range of movement  Chest: Normal breath sounds, normal chest wall exam, symmetric, good air entry, clear to auscultation  Heart: Regular rate and rhythm, without murmur, gallop, or rubs  Abdomen soft nontender no masses felt no hepatosplenomegaly, no rebound or guarding  BACK:  no CTLS spine tenderness, no flank tenderness  Extremities: full range of movement  bilateral UE and bilateral LE,  no lower extremity edema  Neurological: Alert, oriented, cranial nerves II-XII intact except for visual acuity  Sensation:  Intact   Gait: normal steady      Assessment/Plan   Mohit Villatoro is a 68 y.o. male who presents for the concerns below:    Problem List Items Addressed This Visit    None    SLEEP DISTURBANCE PLAN:  goal is 7-9 hours of sleep contributes to optimal physical and brain health.   With his knee pain recovery and urology changes he has been getting 4 hours , will inc trazodone to 100 mg hoping in spring and with complete recovery from other conditions will get better sleep and may be able to reduce to 50 mg again  Sleep hygiene,     DEPRESSION he is on wellbutrin , seeing /talking to counselor,  with knee and urology changes again not being able to exercise has to bother his well being , may try upper body exercises.  Write re: recipes until he can stand long periods of time cooking     Annual Wellness Visit  the risks and benefits of influenza vaccination were discussed with the patient, influenza  vaccine is up to date this year  the risks and benefits of Prevnar 20  vaccination were discussed with the patient, Prevnar 20 vaccine is    up to date  the risks and benefits of pneumonia vaccination were discussed with the patient, pneumonia vaccine is     up to date   the risks and benefits of  Shingrix  vaccination were discussed with the patient, Shingrix  vaccine is   not     up to date ?  the risks and benefits of tetanus vaccination were discussed with the patient, tetanus vaccine is   not   up to date  on our records 2014 so due next year    Cardiovascular screening and counseling the risk and benefits of screening were discussed counseling on maintaining a healthy diet and due for lipid panel  Colorectal cancer screening and counseling; the risks and benefits of screening were discussed with the patient, colon cancer screening is     up to date did amber last year negative looking for the actual results report since switched to new system epic    Abdominal aortic aneurysm screening and counseling,  the risks and benefits of screening were discussed with the patient, screening is not indicated   Visual acuity / Glaucoma screening and counseling , the risks and benefits of vision screening were discussed with the patient, screening is current   HIV screening and Counseling, the risks and benefits of screening were discussed with the patient, screening is not indicated   Advance directive planning : needs to be updated   Other Preventive Counseling Provided :  fall risk reduction   and eventually increasing physical activity .  Patient Discussion:  plan discussed with the patient and follow-up visit needed          Discussed with:   Return in :  4 months  Portions of this note were generated using digital voice recognition software, and may contain grammatical errors       Valentin Vazquez MD  12/01/23  9:27 AM

## 2023-12-04 ENCOUNTER — LAB (OUTPATIENT)
Dept: LAB | Facility: LAB | Age: 68
End: 2023-12-04
Payer: MEDICARE

## 2023-12-04 DIAGNOSIS — E78.00 ELEVATED LDL CHOLESTEROL LEVEL: ICD-10-CM

## 2023-12-04 DIAGNOSIS — K21.9 GASTROESOPHAGEAL REFLUX DISEASE, UNSPECIFIED WHETHER ESOPHAGITIS PRESENT: ICD-10-CM

## 2023-12-04 DIAGNOSIS — Z12.5 PROSTATE CANCER SCREENING: ICD-10-CM

## 2023-12-04 DIAGNOSIS — R79.9 ABNORMAL BLOOD CHEMISTRY: ICD-10-CM

## 2023-12-04 LAB
ALBUMIN SERPL BCP-MCNC: 4.2 G/DL
ALP SERPL-CCNC: 71 U/L
ALT SERPL W P-5'-P-CCNC: 11 U/L
ANION GAP SERPL CALC-SCNC: 13 MMOL/L
AST SERPL W P-5'-P-CCNC: 12 U/L
BASOPHILS # BLD AUTO: 0.02 X10*3/UL (ref 0–0.1)
BASOPHILS NFR BLD AUTO: 0.3 %
BILIRUB SERPL-MCNC: 1.1 MG/DL (ref 0–1.2)
BUN SERPL-MCNC: 17 MG/DL
CALCIUM SERPL-MCNC: 9.4 MG/DL
CHLORIDE SERPL-SCNC: 105 MMOL/L
CHOLEST SERPL-MCNC: 151 MG/DL (ref 133–200)
CHOLEST/HDLC SERPL: 2.6 {RATIO}
CO2 SERPL-SCNC: 24 MMOL/L
CREAT SERPL-MCNC: 0.82 MG/DL
EOSINOPHIL # BLD AUTO: 0.24 X10*3/UL (ref 0–0.7)
EOSINOPHIL NFR BLD AUTO: 3.6 %
ERYTHROCYTE [DISTWIDTH] IN BLOOD BY AUTOMATED COUNT: 13.4 % (ref 11.5–14.5)
EST. AVERAGE GLUCOSE BLD GHB EST-MCNC: 105 MG/DL
GFR SERPL CREATININE-BSD FRML MDRD: >90 ML/MIN/1.73M*2
GLUCOSE SERPL-MCNC: 102 MG/DL
HBA1C MFR BLD: 5.3 %
HCT VFR BLD AUTO: 44.1 % (ref 41–52)
HDLC SERPL-MCNC: 59 MG/DL
HGB BLD-MCNC: 14.7 G/DL (ref 13.5–17.5)
IMM GRANULOCYTES # BLD AUTO: 0.01 X10*3/UL (ref 0–0.7)
IMM GRANULOCYTES NFR BLD AUTO: 0.2 % (ref 0–0.9)
LDLC SERPL CALC-MCNC: 71 MG/DL (ref 65–130)
LYMPHOCYTES # BLD AUTO: 1.66 X10*3/UL (ref 1.2–4.8)
LYMPHOCYTES NFR BLD AUTO: 25.2 %
MCH RBC QN AUTO: 30.7 PG (ref 26–34)
MCHC RBC AUTO-ENTMCNC: 33.3 G/DL (ref 32–36)
MCV RBC AUTO: 92 FL (ref 80–100)
MONOCYTES # BLD AUTO: 0.53 X10*3/UL (ref 0.1–1)
MONOCYTES NFR BLD AUTO: 8 %
NEUTROPHILS # BLD AUTO: 4.13 X10*3/UL (ref 1.2–7.7)
NEUTROPHILS NFR BLD AUTO: 62.7 %
PLATELET # BLD AUTO: 192 X10*3/UL (ref 150–450)
PMV BLD AUTO: 10.2 FL (ref 7.5–11.5)
POTASSIUM SERPL-SCNC: 4.3 MMOL/L
PROT SERPL-MCNC: 5.9 G/DL
PSA SERPL-MCNC: 0.6 NG/ML
RBC # BLD AUTO: 4.79 X10*6/UL (ref 4.5–5.9)
SODIUM SERPL-SCNC: 138 MMOL/L
TRIGL SERPL-MCNC: 107 MG/DL (ref 40–150)
TSH SERPL DL<=0.05 MIU/L-ACNC: 1.84 MIU/L (ref 0.27–4.2)
WBC # BLD AUTO: 6.6 X10*3/UL (ref 4.4–11.3)

## 2023-12-04 PROCEDURE — 85025 COMPLETE CBC W/AUTO DIFF WBC: CPT

## 2023-12-04 PROCEDURE — 80053 COMPREHEN METABOLIC PANEL: CPT

## 2023-12-04 PROCEDURE — 84443 ASSAY THYROID STIM HORMONE: CPT

## 2023-12-04 PROCEDURE — 36415 COLL VENOUS BLD VENIPUNCTURE: CPT

## 2023-12-04 PROCEDURE — 83036 HEMOGLOBIN GLYCOSYLATED A1C: CPT

## 2023-12-04 PROCEDURE — G0103 PSA SCREENING: HCPCS

## 2023-12-04 PROCEDURE — 80061 LIPID PANEL: CPT

## 2023-12-05 ENCOUNTER — TREATMENT (OUTPATIENT)
Dept: PHYSICAL THERAPY | Facility: CLINIC | Age: 68
End: 2023-12-05
Payer: MEDICARE

## 2023-12-05 DIAGNOSIS — M25.562 LEFT KNEE PAIN: Primary | ICD-10-CM

## 2023-12-05 PROCEDURE — 97112 NEUROMUSCULAR REEDUCATION: CPT | Mod: GP,CQ | Performed by: SPECIALIST/TECHNOLOGIST

## 2023-12-05 PROCEDURE — 97016 VASOPNEUMATIC DEVICE THERAPY: CPT | Mod: GP,CQ | Performed by: SPECIALIST/TECHNOLOGIST

## 2023-12-05 PROCEDURE — 97110 THERAPEUTIC EXERCISES: CPT | Mod: GP,CQ | Performed by: SPECIALIST/TECHNOLOGIST

## 2023-12-05 ASSESSMENT — PAIN SCALES - GENERAL: PAINLEVEL_OUTOF10: 1

## 2023-12-05 ASSESSMENT — PAIN - FUNCTIONAL ASSESSMENT: PAIN_FUNCTIONAL_ASSESSMENT: 0-10

## 2023-12-05 NOTE — PROGRESS NOTES
"Physical Therapy Treatment    Patient Name: Mohit Villatoro  MRN: [id number]  Today's Date: 12/5/2023  Time Calculation  Start Time: 1055  Stop Time: 1150  Time Calculation (min): 55 min    Insurance:   Visit number: 6  [place] of visits:  [place]  Insurance: [place]   Medicare cert date 11/9/23  to 2/1/24    Current Problem  1. Left knee pain  Follow Up In Physical Therapy        General  Reason for Referral: TKA  Referred By: felisha  Precautions  Precautions  Precautions Comment: none  Pain  Pain Assessment: 0-10  Pain Score: 1    Subjective:   Subjective   Patient reports generally feeling better but still swollen.     Compliant with HEP? yes    Objective:   Objective   Knee effusion 1.5 cm jt line  Knee AROM pre 0°-125° post   Trendelenburg gait uncompensated    Treatments:   Bike 5 min  DBE 2x2 min   Green TB side steps 20 feet x 2 laps   Bosu lunges 10x   6\" step ups 10x R/L   Hip ext R/L 66#/66# 20x   Leg press 80# 2x20   Hams 35# 2x20  SS hams R/L 1'  Incline stretch 2 pos 1'   1/2 roll hip hike 20x   Patellar mobs (emphasize tilt)   SLR 10x10s  Game ready 15 min medium L knee     Charges: TE x2 NME, vaso (cq)     Assessment:  Patient tolerated increased intensity noting mild effort without increased symptoms today.      Plan: Continue to improve ROM, strength, flexibility and balance to improve gait and ADL      "

## 2023-12-07 ENCOUNTER — ANCILLARY PROCEDURE (OUTPATIENT)
Dept: RADIOLOGY | Facility: CLINIC | Age: 68
End: 2023-12-07
Payer: MEDICARE

## 2023-12-07 ENCOUNTER — TREATMENT (OUTPATIENT)
Dept: PHYSICAL THERAPY | Facility: CLINIC | Age: 68
End: 2023-12-07
Payer: MEDICARE

## 2023-12-07 ENCOUNTER — OFFICE VISIT (OUTPATIENT)
Dept: ORTHOPEDIC SURGERY | Facility: CLINIC | Age: 68
End: 2023-12-07
Payer: MEDICARE

## 2023-12-07 VITALS — WEIGHT: 185 LBS | HEIGHT: 71 IN | BODY MASS INDEX: 25.9 KG/M2

## 2023-12-07 DIAGNOSIS — Z96.652 AFTERCARE FOLLOWING LEFT KNEE JOINT REPLACEMENT SURGERY: ICD-10-CM

## 2023-12-07 DIAGNOSIS — Z47.1 AFTERCARE FOLLOWING LEFT KNEE JOINT REPLACEMENT SURGERY: ICD-10-CM

## 2023-12-07 DIAGNOSIS — M25.562 LEFT KNEE PAIN: ICD-10-CM

## 2023-12-07 DIAGNOSIS — M23.92 INTERNAL DERANGEMENT OF LEFT KNEE: Primary | ICD-10-CM

## 2023-12-07 PROCEDURE — 1036F TOBACCO NON-USER: CPT | Performed by: PHYSICIAN ASSISTANT

## 2023-12-07 PROCEDURE — 97016 VASOPNEUMATIC DEVICE THERAPY: CPT | Mod: GP,CQ | Performed by: SPECIALIST/TECHNOLOGIST

## 2023-12-07 PROCEDURE — 99024 POSTOP FOLLOW-UP VISIT: CPT | Performed by: PHYSICIAN ASSISTANT

## 2023-12-07 PROCEDURE — 73562 X-RAY EXAM OF KNEE 3: CPT | Mod: LT,FY

## 2023-12-07 PROCEDURE — 1126F AMNT PAIN NOTED NONE PRSNT: CPT | Performed by: PHYSICIAN ASSISTANT

## 2023-12-07 PROCEDURE — 97112 NEUROMUSCULAR REEDUCATION: CPT | Mod: GP,CQ | Performed by: SPECIALIST/TECHNOLOGIST

## 2023-12-07 PROCEDURE — 1159F MED LIST DOCD IN RCRD: CPT | Performed by: PHYSICIAN ASSISTANT

## 2023-12-07 PROCEDURE — 1160F RVW MEDS BY RX/DR IN RCRD: CPT | Performed by: PHYSICIAN ASSISTANT

## 2023-12-07 PROCEDURE — 97110 THERAPEUTIC EXERCISES: CPT | Mod: GP,CQ | Performed by: SPECIALIST/TECHNOLOGIST

## 2023-12-07 PROCEDURE — 73562 X-RAY EXAM OF KNEE 3: CPT | Mod: LEFT SIDE | Performed by: RADIOLOGY

## 2023-12-07 ASSESSMENT — PAIN - FUNCTIONAL ASSESSMENT
PAIN_FUNCTIONAL_ASSESSMENT: NO/DENIES PAIN
PAIN_FUNCTIONAL_ASSESSMENT: 0-10

## 2023-12-07 ASSESSMENT — PAIN SCALES - GENERAL: PAINLEVEL_OUTOF10: 0 - NO PAIN

## 2023-12-07 NOTE — PROGRESS NOTES
Ibeth Christian, BARBARA, PARheaC, ATC  Adult Reconstruction and Joint Replacement Surgery  Phone: [phone]     Fax:[phone]            Chief Complaint   Patient presents with    Left Knee - Post-op     6 wk s/p L TKA, DOS 10/23/23       HPI:  Mohit Villatoro is a pleasant 68 y.o. year-old male here for follow-up of their side: left total knee arthroplasty by Dr. Abdul.  The patient is approximately 6 week(s) postop.The patient has no mechanical symptoms.  The patient has mild swelling and pain.   The patients wound has healed uneventfully.  The patient has been doing HEP and/or outpatient PT.  No complications postoperatively.      Review of Systems  Past Medical History:   Diagnosis Date    Abnormal finding of blood chemistry, unspecified 11/30/2022    Abnormal blood chemistry    Adjustment disorder, unspecified 12/17/2021    Adult situational stress disorder    Allergic rhinitis, unspecified 09/01/2017    Allergic rhinitis    Benign prostatic hyperplasia without lower urinary tract symptoms 12/16/2022    BPH (benign prostatic hyperplasia)    Encounter for general adult medical examination without abnormal findings 11/30/2022    Welcome to Medicare preventive visit    Encounter for screening for malignant neoplasm of prostate 10/12/2022    Screening for prostate cancer    Nocturia 09/01/2017    Nocturia    Obstructive sleep apnea syndrome 10/04/2023    Other abnormality of red blood cells 10/12/2022    Elevated MCV    Pain in right knee 01/05/2023    Bilateral knee pain    Poor urinary stream 12/16/2022    Weak urine stream    Pure hypercholesterolemia, unspecified 10/12/2022    Elevated LDL cholesterol level    Sleep disorder, unspecified 04/09/2021    Sleep disturbance    Unspecified abnormal findings in urine 09/10/2020    Urine abnormality    Unspecified skin changes 04/26/2021    Skin change     Patient Active Problem List   Diagnosis    Acquired short Achilles tendon of left lower  extremity    Adult situational stress disorder    Age-related nuclear cataract of left eye    Age-related nuclear cataract of right eye    Benign neoplasm of colon    BPH (benign prostatic hyperplasia)    Blood pressure elevated without history of HTN    Closed subchondral insufficiency fracture of condyle of left femur (CMS/HCC)    Herpes zoster dermatitis    Shingles    Elevated LDL cholesterol level    Elevated MCV    Hyperopia of both eyes    Hypersomnia with sleep apnea    Medial meniscus tear    Nocturia    Obstructive sleep apnea syndrome    Skin change    Sleep disturbance    Blurred vision    Urine abnormality    Abnormal blood chemistry    BPH with obstruction/lower urinary tract symptoms    Cavus deformity of left foot    Femoroacetabular impingement of both hips    Hyperopia with presbyopia of both eyes    Low testosterone    Male erectile disorder    Overweight with body mass index (BMI) of 28 to 28.9 in adult    Primary localized osteoarthritis of left knee    Vitamin D deficiency    Tendinopathy of left gluteus medius    Tendinopathy of right gluteus medius    Bladder neck contracture    Left knee pain     Medication Documentation Review Audit       Reviewed by Roya Cleary, PCNA (Patient Care Technician) on 12/07/23 at 1128      Medication Order Taking? Sig Documenting Provider Last Dose Status   buPROPion SR (Wellbutrin SR) 100 mg 12 hr tablet [id number] Yes Take 1 tablet (100 mg) by mouth 2 times a day. Do not crush, chew, or split. Historical Provider, MD Taking Active    Patient not taking:   Discontinued 12/01/23 0926     Discontinued 12/01/23 0929    Patient not taking:   Discontinued 12/01/23 0926     Discontinued 12/01/23 0929     Discontinued 12/01/23 0929   traZODone (Desyrel) 100 mg tablet [id number] Yes Take 1 tablet (100 mg) by mouth once daily at bedtime. Valentin Vazquez MD Taking Active     Discontinued 12/01/23 0931                  Allergies   Allergen Reactions     Fluoxetine Hcl Unknown     sex dysfunction     Social History     Socioeconomic History    Marital status:      Spouse name: Not on file    Number of children: Not on file    Years of education: Not on file    Highest education level: Not on file   Occupational History     Employer: [employer]   Tobacco Use    Smoking status: Former     Packs/day: 0.50     Years: 20.00     Additional pack years: 0.00     Total pack years: 10.00     Types: Cigarettes    Smokeless tobacco: Never    Tobacco comments:     Quit smoking cigarettes 1997, smoked 0.5 ppd x 20 yrs   Vaping Use    Vaping Use: Never used   Substance and Sexual Activity    Alcohol use: Yes     Alcohol/week: 4.0 standard drinks of alcohol     Types: 4 Glasses of wine per week     Comment: occasionally 1-4    Drug use: Never    Sexual activity: Not on file   Other Topics Concern    Not on file   Social History Narrative    Not on file     Social Determinants of Health     Financial Resource Strain: Not on file   Food Insecurity: Not on file   Transportation Needs: No Transportation Needs (11/7/2023)    [place] : Transportation     Lack of Transportation (Medical): No     Lack of Transportation (Non-Medical): No     Patient Unable or Declines to Respond: No   Physical Activity: Not on file   Stress: Not on file   Social Connections: Feeling Somewhat Isolated (11/7/2023)    [place] : Social Isolation     Frequency of experiencing loneliness or isolation: Sometimes   Intimate Partner Violence: Not on file   Housing Stability: Not on file     Past Surgical History:   Procedure Laterality Date    LASER OF PROSTATE W/ GREEN LIGHT PVP      Holmium laser of prostate(not green light)    OTHER SURGICAL HISTORY  11/27/2018    Hernia repair       Physical Exam  side: left Knee  There were no vitals filed for this visit.  AxO x 3 in NAD.   Assistive Device: no device. Coordination and balance intact.  Normal bilateral upper and lower extremities.  No  erythema, ecchymosis, temperature changes. No popliteal lymphadenopathy,  No overlying lesion  Mood: euthymic  Respirations non-labored  The incision is midline healing well with no signs of surrounding infection, dehiscence or drainage.   Neurovascular exam is at baseline.  No instability varus or valgus stressing the knee at 0, 30 or 60 degrees.  No instability in the AP plane at 90 degrees.  Range of motion: 0 degrees extension, 120 degrees flexion  5/5 hip flexion/knee extension/DF/PF/EHL  SILT in darline/saph/ per/tib distribution   Extremities warm and well perfused.  No lower extremity calf tenderness, warmth or swelling. Lower extremity well perfused  2+ Femoral/DP/PT pulses bilaterally    Imaging:  No images are attached to the encounter.    I personally reviewed multiple views of the knee today in clinic. Status post side: left Total Knee aArthroplasty. The implant is well fixed, well aligned.  No evidence of janna-implant fracture, lucency or dislocation.    Impression/Plan:  Mohit Villatoro is doing well post-operatively and happy with the results of the operation.     S/P side: left Total Knee Arthroplasty  I talked with patient at length about activity precautions and progression of activities. The patient understands their permanent precautions. At this time, you may gradually increase your activities and get back to a normal, low-impact lifestyle. Please avoid running, jumping, and heavy lifting.      3. Continue HEP or outpatient PT, per protocol.    4. Continue Post-operative instructions.    5. Discussed the importance of dental prophylactic dental antibiotics lifelong. Patient may request medication refill through MyChart,       Pharmacy or surgeons office.    All questions answered.    Follow-up 1 year with x-rays at next visit.    Ibeth Christian MPAS, PA-C, ATC  Orthopedic Physician Assisant  Adult Reconstruction and Total Joint Replacement  General Orthopedics  Department of Orthopaedic  Surgery  [facility]  [id number] [place], [place] [address]  Milana messaging preferred

## 2023-12-07 NOTE — PROGRESS NOTES
"Physical Therapy Treatment    Patient Name: Mohit Villatoro  MRN: [id number]  Today's Date: 12/7/2023  Time Calculation  Start Time: 0940  Stop Time: 1040  Time Calculation (min): 60 min    Insurance:   Visit number: 7  [place] of visits:  [place]  Insurance: Medicare   Medicare cert date 11/9/23  to 2/1/24    Current Problem  1. Left knee pain  Follow Up In Physical Therapy        General  Reason for Referral: TKA  Referred By: felisha  Precautions  Precautions  Precautions Comment: none  Pain  Pain Assessment: 0-10  Pain Score: 0 - No pain    Subjective:   Subjective   Patient reports having stiffness in both knees this morning.  Patient notes typical discomfort but not pain.     Compliant with HEP? yes    Objective:   Objective   Knee effusion 2.5 cm jt line  Suprapatellar edema 2.0 cm   Knee AROM pre 0°-118° post 0°-126°    Treatments:   Swisswing quads in standing 2'   Bike 5 min  DBE 2x2 min   Green TB side steps 20 feet x 2 laps   Bosu lunges 15x   6\" step ups 10x R/L   Hip ext R/L 77#/77# 20x   Leg press 90# 2x20   Hams 35# 2x20  SS hams R/L 1'  Incline stretch 2 pos 1'   1/2 roll hip hike 20x   Game ready 10 min medium L knee     Charges: TE x2 NME, vaso (cq)     Assessment:  Patient tolerated increased intensity noting mild effort without increased symptoms today.  Patient improved AROM by end of session.      Plan: Continue to improve ROM, strength, flexibility and balance to improve gait and ADL.  Patient has MD follow-up today after therapy.       "

## 2023-12-12 ENCOUNTER — TREATMENT (OUTPATIENT)
Dept: PHYSICAL THERAPY | Facility: CLINIC | Age: 68
End: 2023-12-12
Payer: MEDICARE

## 2023-12-12 DIAGNOSIS — M25.562 LEFT KNEE PAIN: ICD-10-CM

## 2023-12-12 PROCEDURE — 97110 THERAPEUTIC EXERCISES: CPT | Mod: GP

## 2023-12-12 PROCEDURE — 97112 NEUROMUSCULAR REEDUCATION: CPT | Mod: GP

## 2023-12-12 ASSESSMENT — PAIN SCALES - GENERAL: PAINLEVEL_OUTOF10: 0 - NO PAIN

## 2023-12-12 NOTE — PROGRESS NOTES
Physical Therapy Treatment    Patient Name: Mohit Villatoro  MRN: [id number]  Today's Date: 12/12/2023  Time Calculation  Start Time: 0920  Stop Time: 1002  Time Calculation (min): 42 min    Insurance:     Visit number: 8  [place]ved [place] of visits:  [place]  Insurance: Medicare   Medicare cert date 11/9/23  to 2/1/24    Current Problem  1. Left knee pain  Follow Up In Physical Therapy          General  Reason for Referral: TKA  Referred By: felisha  Precautions  Precautions  Precautions Comment: none  Pain  Pain Score: 0 - No pain    Subjective:   Subjective   Patient reports knee doing pretty good but still feels it.     Compliant with HEP? yes    Objective:   Objective   Knee flexion 126    Treatments:   Bike 5 min  SLS L 1 min  Bosu lunges 20 x B  Bosu step up L 20 x   Inch worms BTB 20' x 2  Monster walks 20' x 2  Bosu squats 20 x  Figure 4 1 min x 2 L  Lunges in // bars 10 x 2 1 airex B  1/2 foam rebounder toss 25 x 2 way  Tandem stance rebounder toss 25 x R/L, L/r  Leg press 90# 12 x 3, unilateral 40# 15 x B    Assessment: tolerated well, challenged with balance which may be contributing to his antalgic gait.        Plan: start 1x for 4 weeks in jan and prep for discharge at that point

## 2023-12-14 ENCOUNTER — TREATMENT (OUTPATIENT)
Dept: PHYSICAL THERAPY | Facility: CLINIC | Age: 68
End: 2023-12-14
Payer: MEDICARE

## 2023-12-14 DIAGNOSIS — M25.562 LEFT KNEE PAIN: ICD-10-CM

## 2023-12-14 PROCEDURE — 97112 NEUROMUSCULAR REEDUCATION: CPT | Mod: GP,KX

## 2023-12-14 PROCEDURE — 97110 THERAPEUTIC EXERCISES: CPT | Mod: GP,KX

## 2023-12-14 ASSESSMENT — PAIN SCALES - GENERAL: PAINLEVEL_OUTOF10: 0 - NO PAIN

## 2023-12-14 NOTE — PROGRESS NOTES
Physical Therapy Treatment    Patient Name: Mohit Villatoro  MRN: [id number]  Today's Date: 12/14/2023  Time Calculation  Start Time: 0915  Stop Time: 1000  Time Calculation (min): 45 min    Insurance:   Visit number: 9  [place] of visits:  [place]  Insurance: [place]   Medicare cert date 11/9/23  to 2/1/24    Current Problem  1. Left knee pain  Follow Up In Physical Therapy          General  Reason for Referral: TKA  Referred By: felisha  Precautions  Precautions  Precautions Comment: none  Pain  Pain Score: 0 - No pain    Subjective:   Subjective   Patient reports usual stiffness. Starting to notice clicking in the knee as swelling is going down.     Compliant with HEP? yes    Objective:   Objective       Treatments:   Upright bike 5 min  Lateral tap down 4 inch 10 x 3 L  Step up 12 inch 12 x 2 L  Calf raise 3 way 15/15 7#   RDL 4 kg 15 x 2 L  Sit to stand 4 kg 4 inch under R foot 12 x 2  Eccentric HSC 30# 15 x B  Figure 4 stretch  1 min L  Resisted walking 4 way 10 x     Assessment: challenged with balance exercises       Plan: balance stability, strengthening

## 2023-12-19 ENCOUNTER — TREATMENT (OUTPATIENT)
Dept: PHYSICAL THERAPY | Facility: CLINIC | Age: 68
End: 2023-12-19
Payer: MEDICARE

## 2023-12-19 DIAGNOSIS — M25.562 LEFT KNEE PAIN: ICD-10-CM

## 2023-12-19 PROCEDURE — 97112 NEUROMUSCULAR REEDUCATION: CPT | Mod: GP,CQ | Performed by: SPECIALIST/TECHNOLOGIST

## 2023-12-19 PROCEDURE — 97110 THERAPEUTIC EXERCISES: CPT | Mod: GP,CQ | Performed by: SPECIALIST/TECHNOLOGIST

## 2023-12-19 ASSESSMENT — PAIN SCALES - GENERAL: PAINLEVEL_OUTOF10: 0 - NO PAIN

## 2023-12-19 NOTE — PROGRESS NOTES
"Physical Therapy Treatment    Patient Name: Mohit Villatoro  MRN: [id number]  Today's Date: 12/19/2023  Time Calculation  Start Time: 0915  Stop Time: 1000  Time Calculation (min): 45 min    Insurance:   Visit number: 10  [place] of visits:  [place]  Insurance: [place]   Medicare cert date 11/9/23  to 2/1/24    Current Problem  1. Left knee pain  Follow Up In Physical Therapy          General  Reason for Referral: TKA  Referred By: felisha  Precautions  Precautions  Precautions Comment: none  Pain  Pain Score: 0 - No pain    Subjective:   Subjective   Patient reports usual stiffness. Starting to notice clicking in the knee as swelling is going down.     Compliant with HEP? yes    Objective:   Objective   Effusion 1.7 cm jt line   Knee AROM 0°-127°    Treatments:   Upright bike 5 min  Airex MIP 2'   4 kg KB SLS & Swing cw/ccw 10x R/L   4 kg KB SL RDL to 4\" step 10x R/L   6\" step ups R/L 15x  Hip ext R/L 77#/77# 20x   4# rebounder on airex 10x R/L   Eccentric HSC 35# 20 x R/L   SS R/L Hams 1'   BOSU Step ups R/L 1'   Leg Press 120# 20x, L/R/L 50# 20x  Toe press 30# 3 pos 20x  Figure 4 stretch  1 min L  Resisted walking 4 way 10 x     Charges: TE x2, NME (cq)     Assessment: Patient tolerated intensity with mild fatigue noting challenge of balance.  Patient noted feeling good post treatment.        Plan: Continue to improve ROM, strength, flexibility and balance to improve gait and ADL      "

## 2023-12-21 ENCOUNTER — TREATMENT (OUTPATIENT)
Dept: PHYSICAL THERAPY | Facility: CLINIC | Age: 68
End: 2023-12-21
Payer: MEDICARE

## 2023-12-21 DIAGNOSIS — F43.29 STRESS AND ADJUSTMENT REACTION: Primary | ICD-10-CM

## 2023-12-21 DIAGNOSIS — M25.562 LEFT KNEE PAIN: Primary | ICD-10-CM

## 2023-12-21 PROCEDURE — 97110 THERAPEUTIC EXERCISES: CPT | Mod: GP,CQ | Performed by: SPECIALIST/TECHNOLOGIST

## 2023-12-21 PROCEDURE — 97112 NEUROMUSCULAR REEDUCATION: CPT | Mod: GP,CQ | Performed by: SPECIALIST/TECHNOLOGIST

## 2023-12-21 RX ORDER — BUPROPION HYDROCHLORIDE 100 MG/1
100 TABLET, EXTENDED RELEASE ORAL 2 TIMES DAILY
Qty: 180 TABLET | Refills: 1 | Status: SHIPPED | OUTPATIENT
Start: 2023-12-21

## 2023-12-21 ASSESSMENT — PAIN - FUNCTIONAL ASSESSMENT: PAIN_FUNCTIONAL_ASSESSMENT: 0-10

## 2023-12-21 ASSESSMENT — PAIN SCALES - GENERAL: PAINLEVEL_OUTOF10: 0 - NO PAIN

## 2023-12-21 NOTE — PROGRESS NOTES
"Physical Therapy Treatment    Patient Name: Mohit Villatoro  MRN: [id number]  Today's Date: 12/21/2023  Time Calculation  Start Time: 0905  Stop Time: 0949  Time Calculation (min): 44 min    Insurance:   Visit number: 11  [place] of visits:  [place]  Insurance: [place]   Medicare cert date 11/9/23  to 2/1/24    Current Problem  1. Left knee pain  Follow Up In Physical Therapy        General  Reason for Referral: TKA  Referred By: felisha    Precautions  Precautions  Precautions Comment: none    Pain  Pain Assessment: 0-10  Pain Score: 0 - No pain    Subjective:   Subjective   Patient arrived full weight bearing with a slight limp noting no pain on arrival.  Patient notes no soreness after last session.  Patient still feels that he is walking funny.       Compliant with HEP? yes    Objective:   Objective   Effusion 1.0 cm jt line   Knee AROM 0°-126°    Treatments:   Stepper L2 5 min   DBE 2x2 min   4 kg KB SLS & Swing cw/ccw 10x R/L   4 kg KB SL RDL to 4\" step 10x R/L   6\" step ups R/L 15x  Hip ext R/L 88#/88# 20x   4# rebounder on airex 10x R/L   Eccentric HSC 55# 20x B/R/L     SS R/L Hams 1'   BOSU Step ups R/L 15x   Leg Press 130# 20x, L/R/L 50# 20x  Toe press 30# 3 pos 20x  // bars high knee 3 laps, shuffle with high knee 3 laps    Charges: TE x2, NME (cq)     Assessment: Patient tolerated increased intensity with mild fatigue noting challenge of balance.  Patient noted feeling good post treatment.        Plan: Continue to improve ROM, strength, flexibility and balance to improve gait and ADL.  Patient has next 4 sessions with Dede Arreguin PT (including re-check).       "

## 2023-12-21 NOTE — TELEPHONE ENCOUNTER
Processed.  LG    ----- Message from Anne Marie Fields sent at 12/20/2023  4:11 PM EST -----  Regarding: FW: Renew Wellbutrin   Contact: [phone]    ----- Message -----  From: Mohit Villatoro  Sent: 12/20/2023   4:03 PM EST  To: Do Val[facility] [place] [address] Clinical Support Staff  Subject: Renew Wellbutrin                                 Hello; I need my prescription for Wellbutrin (Broprion) renewed, from [facility].    Thank you,  Mohit

## 2024-01-05 ENCOUNTER — TREATMENT (OUTPATIENT)
Dept: PHYSICAL THERAPY | Facility: CLINIC | Age: 69
End: 2024-01-05
Payer: MEDICARE

## 2024-01-05 DIAGNOSIS — M25.562 LEFT KNEE PAIN: Primary | ICD-10-CM

## 2024-01-05 PROCEDURE — 97110 THERAPEUTIC EXERCISES: CPT | Mod: GP

## 2024-01-05 ASSESSMENT — PAIN SCALES - GENERAL: PAINLEVEL_OUTOF10: 0 - NO PAIN

## 2024-01-05 NOTE — PROGRESS NOTES
Physical Therapy Treatment    Patient Name: Mohit Villatoro  MRN: [id number]  Today's Date: 1/5/2024  Time Calculation  Start Time: 0804  Stop Time: 0845  Time Calculation (min): 41 min    Insurance:   Visit number: 1 (11 in 2023)  [place] of visits:  [place]  Insurance: Medicare Medicare cert date 11/9/23  to 2/1/24    Current Problem  1. Left knee pain            General  Reason for Referral: TKA  Referred By: felisha  Precautions  Precautions  Precautions Comment: none  Pain  Pain Score: 0 - No pain    Subjective:   Subjective   Patient reports knee still feels stiff and still feels like limping.  Trying to do more exercise but doesn't seem to be expediting the situation. No pain just stiffness.     Compliant with HEP? YES    Objective:   Objective     Mild antalgic gait    Treatments:   Bike 5 min  Eccentric calf raise 15 x B  Calf raise in squat 20 x  Step up 12 inch step 15 x 2 L  Single leg sit-stand high table 12 x 2  RDL 4 kg 10 x 2  Eccentric HSC 50# 15 x B  Leg press 110# 12 x 3, unilateral 70# 15 X L    Assessment: challenged with increased weight but tolerated well.        Plan: lunges resisted walking

## 2024-01-12 ENCOUNTER — TREATMENT (OUTPATIENT)
Dept: PHYSICAL THERAPY | Facility: CLINIC | Age: 69
End: 2024-01-12
Payer: MEDICARE

## 2024-01-12 DIAGNOSIS — M25.562 LEFT KNEE PAIN: Primary | ICD-10-CM

## 2024-01-12 PROCEDURE — 97112 NEUROMUSCULAR REEDUCATION: CPT | Mod: GP

## 2024-01-12 PROCEDURE — 97110 THERAPEUTIC EXERCISES: CPT | Mod: GP

## 2024-01-12 ASSESSMENT — PAIN SCALES - GENERAL: PAINLEVEL_OUTOF10: 0 - NO PAIN

## 2024-01-12 NOTE — PROGRESS NOTES
Physical Therapy Treatment    Patient Name: Mohit Villatoro  MRN: [id number]  Today's Date: 1/12/2024  Time Calculation  Start Time: 0807  Stop Time: 0850  Time Calculation (min): 43 min    Insurance:   Visit number: 2 (11 in 2023)  [place] of visits:  [place]  Insurance: [place]   Medicare cert date 11/9/23  to 2/1/24    Current Problem  1. Left knee pain            General  Reason for Referral: TKA  Referred By: felisha  Precautions  Precautions  Precautions Comment: none  Pain  Pain Score: 0 - No pain    Subjective:   Subjective   Patient reports had a rough week.  Feels like he overdid it at the gym - did bike, all weight machines, swimming then sauna.  Had a lot of pain in the knee afterward.   Pain earlier this week was about 3/10.  Pain subsided on Thursday - did not ice.   Compliant with HEP? yes    Objective:   Objective   Knee flexion 128    Treatments:   Bike 5 min  Sartorius 0# 10 x 3 L  Valslide 3 way 12 x L  Steamboats 5# 15 x 4 way B  RDLs 8 kg 10 x 2 B  Single leg sit to stand higher table 15 x 2 L  Cross over step up box 2 15 x 2  Resisted walking 4 way 10 x   1/2 foam rebounder toss 2 way 25x     Assessment: challenged with balance overall doing well.        Plan: continue general strength and balance L TKA

## 2024-01-17 ENCOUNTER — TELEPHONE (OUTPATIENT)
Dept: ORTHOPEDIC SURGERY | Facility: HOSPITAL | Age: 69
End: 2024-01-17

## 2024-01-17 DIAGNOSIS — Z96.652 S/P TKR (TOTAL KNEE REPLACEMENT) USING CEMENT, LEFT: Primary | ICD-10-CM

## 2024-01-17 RX ORDER — AMOXICILLIN 500 MG/1
CAPSULE ORAL
Qty: 4 CAPSULE | Refills: 6 | Status: SHIPPED | OUTPATIENT
Start: 2024-01-17 | End: 2024-02-08 | Stop reason: SDUPTHER

## 2024-01-17 NOTE — TELEPHONE ENCOUNTER
Patient is requesting medication refills for DENTAL ANTIBIOTICS.  Patient had surgery L TKA on 10/23/2023   Patient Allergy list is up to date.  Patient pharmacy is up to date.    Thanks,  Darshana Hook

## 2024-01-19 ENCOUNTER — TREATMENT (OUTPATIENT)
Dept: PHYSICAL THERAPY | Facility: CLINIC | Age: 69
End: 2024-01-19
Payer: MEDICARE

## 2024-01-19 DIAGNOSIS — M25.562 LEFT KNEE PAIN: Primary | ICD-10-CM

## 2024-01-19 PROCEDURE — 97110 THERAPEUTIC EXERCISES: CPT | Mod: GP

## 2024-01-19 PROCEDURE — 97112 NEUROMUSCULAR REEDUCATION: CPT | Mod: GP

## 2024-01-19 ASSESSMENT — PAIN SCALES - GENERAL: PAINLEVEL_OUTOF10: 0 - NO PAIN

## 2024-01-19 NOTE — PROGRESS NOTES
Physical Therapy Treatment    Patient Name: Mohit Villatoro  MRN: [id number]  Today's Date: 1/19/2024  Time Calculation  Start Time: 0747  Stop Time: 0830  Time Calculation (min): 43 min    Insurance:   Visit number: 3 (11 in 2023)  [place] of visits:  [place]  Insurance: Medicare Medicare cert date 11/9/23  to 2/1/24    Current Problem  1. Left knee pain            General  Reason for Referral: TKA  Referred By: felisha  Precautions  Precautions  Precautions Comment: none  Pain  Pain Score: 0 - No pain    Subjective:   Subjective   Patient reports feels like walking better.  No pain just stiffness.    Compliant with HEP? yes    Objective:   Objective       Treatments:   Bike 5 min  Step up bosu knee drive 20 x B  Lunges 1 airex 10 x B  Calf raise 3 way 10# 15/15   SLS kb pass cw  & ccw 20 x B  Eccentric HSC 40# 15 x B  Leg press 100# 12 x 3  Unilateral 50# 15 x B  Airex tandem stance rebounder toss 25 x R/L, L/R  X walk  green belt 20' x 2    Assessment: gait improved.         Plan: one more visit and prep for discharge.

## 2024-01-26 ENCOUNTER — TREATMENT (OUTPATIENT)
Dept: PHYSICAL THERAPY | Facility: CLINIC | Age: 69
End: 2024-01-26
Payer: MEDICARE

## 2024-01-26 DIAGNOSIS — M25.562 LEFT KNEE PAIN: Primary | ICD-10-CM

## 2024-01-26 PROCEDURE — 97110 THERAPEUTIC EXERCISES: CPT | Mod: GP

## 2024-01-26 ASSESSMENT — PAIN SCALES - GENERAL: PAINLEVEL_OUTOF10: 0 - NO PAIN

## 2024-01-26 NOTE — PROGRESS NOTES
Physical Therapy Treatment    Patient Name: Mohit Villatoro  MRN: [id number]  Today's Date: 1/26/2024  Time Calculation  Start Time: 0745  Stop Time: 0830  Time Calculation (min): 45 min    Insurance:   Visit number: 4 (11 in 2023)  [place] of visits:  [place]  Insurance: Medicare Medicare cert date 11/9/23  to 2/1/24    Current Problem  1. Left knee pain            General  Reason for Referral: TKA  Referred By: felisha  Precautions  Precautions  Precautions Comment: none  Pain  Pain Score: 0 - No pain    Subjective:   Subjective   Patient reports just stiff. No pain.     Compliant with HEP? yes    Objective:   Objective     Knee ROM  0-127  Treatments:   Bike 5 min  Floor to stand transfers 5x  Step up 12 inch 15 x 2 L  Lunges 10 x B  Mulithip 33# 12 x 2 B 3 way  HSC 40# 12 x 3  Leg press 110# 12 x 3, unilateral leg press 50# 15 x 3 B      Assessment: doing well.  Met all goals and ready for discharge.       Plan: discharge

## 2024-01-29 ENCOUNTER — OFFICE VISIT (OUTPATIENT)
Dept: UROLOGY | Facility: CLINIC | Age: 69
End: 2024-01-29
Payer: MEDICARE

## 2024-01-29 DIAGNOSIS — N40.1 BPH WITH OBSTRUCTION/LOWER URINARY TRACT SYMPTOMS: Primary | ICD-10-CM

## 2024-01-29 DIAGNOSIS — N52.9 ERECTILE DYSFUNCTION, UNSPECIFIED ERECTILE DYSFUNCTION TYPE: ICD-10-CM

## 2024-01-29 DIAGNOSIS — N13.8 BPH WITH OBSTRUCTION/LOWER URINARY TRACT SYMPTOMS: Primary | ICD-10-CM

## 2024-01-29 PROCEDURE — 1159F MED LIST DOCD IN RCRD: CPT | Performed by: UROLOGY

## 2024-01-29 PROCEDURE — 51741 ELECTRO-UROFLOWMETRY FIRST: CPT | Performed by: UROLOGY

## 2024-01-29 PROCEDURE — 1126F AMNT PAIN NOTED NONE PRSNT: CPT | Performed by: UROLOGY

## 2024-01-29 PROCEDURE — 51798 US URINE CAPACITY MEASURE: CPT | Performed by: UROLOGY

## 2024-01-29 PROCEDURE — 99214 OFFICE O/P EST MOD 30 MIN: CPT | Performed by: UROLOGY

## 2024-01-29 PROCEDURE — 1157F ADVNC CARE PLAN IN RCRD: CPT | Performed by: UROLOGY

## 2024-01-29 PROCEDURE — 1036F TOBACCO NON-USER: CPT | Performed by: UROLOGY

## 2024-01-29 RX ORDER — TADALAFIL 20 MG/1
20 TABLET ORAL DAILY PRN
Qty: 30 TABLET | Refills: 8 | Status: SHIPPED | OUTPATIENT
Start: 2024-01-29 | End: 2025-01-28

## 2024-01-29 NOTE — PROGRESS NOTES
Subjective   Mohit Villatoro is a 68 y.o. male with history of BPH with LUTs s/p HoLEP on 04/13/2023 and BNC s/p bladder neck incision on 10/19/2023. Patient reports complete resolution of obstructive voiding symptoms. Patient has complaints of partial erections. He is interested in medication for ED. Patient denies any recent gross hematuria, dysuria, nausea, vomiting, fevers or chills.     Past Medical History:   Diagnosis Date    Abnormal finding of blood chemistry, unspecified 11/30/2022    Abnormal blood chemistry    Adjustment disorder, unspecified 12/17/2021    Adult situational stress disorder    Allergic rhinitis, unspecified 09/01/2017    Allergic rhinitis    Benign prostatic hyperplasia without lower urinary tract symptoms 12/16/2022    BPH (benign prostatic hyperplasia)    Encounter for general adult medical examination without abnormal findings 11/30/2022    Welcome to Medicare preventive visit    Encounter for screening for malignant neoplasm of prostate 10/12/2022    Screening for prostate cancer    Nocturia 09/01/2017    Nocturia    Obstructive sleep apnea syndrome 10/04/2023    Other abnormality of red blood cells 10/12/2022    Elevated MCV    Pain in right knee 01/05/2023    Bilateral knee pain    Poor urinary stream 12/16/2022    Weak urine stream    Pure hypercholesterolemia, unspecified 10/12/2022    Elevated LDL cholesterol level    Sleep disorder, unspecified 04/09/2021    Sleep disturbance    Unspecified abnormal findings in urine 09/10/2020    Urine abnormality    Unspecified skin changes 04/26/2021    Skin change     Past Surgical History:   Procedure Laterality Date    LASER OF PROSTATE W/ GREEN LIGHT PVP      Holmium laser of prostate(not green light)    OTHER SURGICAL HISTORY  11/27/2018    Hernia repair     Family History   Problem Relation Name Age of Onset    Hypertension Mother      Diabetes Sister      Hashimoto's thyroiditis Sister      Other (cardiac disorder) Other      Other  (allergy) Other      Hypertension Other       Current Outpatient Medications   Medication Sig Dispense Refill    amoxicillin (Amoxil) 500 mg capsule Take 4 Tablets 1 Hour Prior to Dental Procedure or Cleaning. (Patient not taking: Reported on 1/29/2024) 4 capsule 6    buPROPion SR (Wellbutrin SR) 100 mg 12 hr tablet Take 1 tablet (100 mg) by mouth 2 times a day. Do not crush, chew, or split. 180 tablet 1    traZODone (Desyrel) 100 mg tablet Take 1 tablet (100 mg) by mouth once daily at bedtime. 90 tablet 2     No current facility-administered medications for this visit.     Allergies   Allergen Reactions    Fluoxetine Hcl Unknown     sex dysfunction     Social History     Socioeconomic History    Marital status:      Spouse name: Not on file    Number of children: Not on file    Years of education: Not on file    Highest education level: Not on file   Occupational History     Employer: [employer]   Tobacco Use    Smoking status: Former     Packs/day: 0.50     Years: 20.00     Additional pack years: 0.00     Total pack years: 10.00     Types: Cigarettes    Smokeless tobacco: Never    Tobacco comments:     Quit smoking cigarettes 1997, smoked 0.5 ppd x 20 yrs   Vaping Use    Vaping Use: Never used   Substance and Sexual Activity    Alcohol use: Yes     Alcohol/week: 4.0 standard drinks of alcohol     Types: 4 Glasses of wine per week     Comment: occasionally 1-4    Drug use: Never    Sexual activity: Not on file   Other Topics Concern    Not on file   Social History Narrative    Not on file     Social Determinants of Health     Financial Resource Strain: Not on file   Food Insecurity: Not on file   Transportation Needs: No Transportation Needs (11/7/2023)    OASIS : Transportation     Lack of Transportation (Medical): No     Lack of Transportation (Non-Medical): No     Patient Unable or Declines to Respond: No   Physical Activity: Not on file   Stress: Not on file   Social Connections: Feeling  Somewhat Isolated (11/7/2023)    OASIS : Social Isolation     Frequency of experiencing loneliness or isolation: Sometimes   Intimate Partner Violence: Not on file   Housing Stability: Not on file       Review of Systems  Pertinent items are noted in HPI.    Objective     Lab Review  Lab Results   Component Value Date    WBC 6.6 12/04/2023    RBC 4.79 12/04/2023    HGB 14.7 12/04/2023    HCT 44.1 12/04/2023     12/04/2023      Lab Results   Component Value Date    BUN 17 12/04/2023    CREATININE 0.82 12/04/2023      Lab Results   Component Value Date    PSA 0.45 07/25/2023     IPSS 15 and 2  PVR 3ml   Uroflow study demonstrated voided volume of 40 and maximal flow rate of 7 ml/s.     Assessment/Plan   Diagnoses and all orders for this visit:  BPH with obstruction/lower urinary tract symptoms  -     Measure post void residual  -     Urine flow measurement  Erectile dysfunction, unspecified erectile dysfunction type      BPH s/p HoLEP 04/13/2023   BNC s/p bladder neck incision on 10/19/2023.     Patient reports resolution of all obstructive voiding symptoms.     Overall he is satisfied with his urinary outcome.     We will continue to monitor and follow up in 1 year.     3. Erectile Dysfunction     We will start patient on Cialis 20mg.     The patient has been diagnosed with ED and cardiac assessment according to the [place] criteria allows use of oral PDE-5 inhibitor. The patient understands that these medications are not initiators of erection and that he will still require sexual stimulation. If prescribed vardenafil or sildenafil, he was advised to take the medication 30-60 minutes prior to sexual activity and that the efficacy of the medication is decreased following a high-fat meal.     The patient verbalized understanding that nitrates such as nitroglycerine, isosorbide mononitrate, isosorbide dinitrate and any other nitrate preparations are absolutely contradicted with the use of a PDE-5  inhibitor. The patient was advised to alert any medical personal of PDE- 5 inhibitor use should he seek urgent medical attention for any reason.     I explained the common adverse effects of therapy including but not limited to headache, flushing, dizziness, rash, upset stomach, diarrhea, nasal congestion, abnormal vision, back pain, and myalgia. Less common side effects are lightheadedness or blood pressure drop upon standing. We discussed that patients have  after using medications in this class, and sometimes the exact cause of death was not able to be determined. There is a very small risk of nonarteritic ischemic optic neuropathy, a rare cause of blindness that may be permanent while using medications in this class. Patient is instructed to immediately stop this medication and seek medical attention if he develops visual disturbances in one or both eyes.     We discussed that if he experiences erection lasting longer than four hours, he needs to seek immediate treatment at the ER as the longer he waits, the more damage is done to the penile tissue. The patient states that he is not withholding any information about his condition or the use of medications in order to receive a PDE5 inhibitor class medication. No barriers to learning were identified. After all of the patients' questions were satisfactorily answered, he expressed understanding the risks of therapy and wishes to proceed.     All questions were answered to the patient's satisfaction. Patient agrees with the plan and wishes to proceed. Follow-up will be scheduled appropriately.     Scribed for Dr. Leon by Dena Benitez. I , Dr Leon, have personally reviewed and agreed with the information entered by the Virtual Scribe.

## 2024-02-08 ENCOUNTER — TELEPHONE (OUTPATIENT)
Dept: ORTHOPEDIC SURGERY | Facility: HOSPITAL | Age: 69
End: 2024-02-08
Payer: MEDICARE

## 2024-02-08 DIAGNOSIS — Z96.652 S/P TKR (TOTAL KNEE REPLACEMENT) USING CEMENT, LEFT: Primary | ICD-10-CM

## 2024-02-08 RX ORDER — AMOXICILLIN 500 MG/1
CAPSULE ORAL
Qty: 4 CAPSULE | Refills: 6 | Status: SHIPPED | OUTPATIENT
Start: 2024-02-08 | End: 2024-03-01 | Stop reason: ALTCHOICE

## 2024-02-08 NOTE — TELEPHONE ENCOUNTER
Patient is requesting medication refills for DENTAL ANTIBIOTICS.  Patient had surgery L TKA on 10/23/23   Patient Allergy list is up to date.  Patient pharmacy is up to date.    Thanks,  Darshana Hook

## 2024-02-28 ENCOUNTER — TELEPHONE (OUTPATIENT)
Dept: PRIMARY CARE | Facility: CLINIC | Age: 69
End: 2024-02-28
Payer: MEDICARE

## 2024-02-28 NOTE — TELEPHONE ENCOUNTER
Patient stated he has an appointment on Friday. He wants to know if you want him to get blood work done prior to his appointment.

## 2024-03-01 ENCOUNTER — OFFICE VISIT (OUTPATIENT)
Dept: PRIMARY CARE | Facility: CLINIC | Age: 69
End: 2024-03-01
Payer: MEDICARE

## 2024-03-01 VITALS — WEIGHT: 194 LBS | SYSTOLIC BLOOD PRESSURE: 121 MMHG | DIASTOLIC BLOOD PRESSURE: 68 MMHG | BODY MASS INDEX: 27.44 KG/M2

## 2024-03-01 DIAGNOSIS — F43.29 STRESS AND ADJUSTMENT REACTION: ICD-10-CM

## 2024-03-01 DIAGNOSIS — R79.9 ABNORMAL BLOOD CHEMISTRY: Primary | ICD-10-CM

## 2024-03-01 PROCEDURE — 1159F MED LIST DOCD IN RCRD: CPT | Performed by: INTERNAL MEDICINE

## 2024-03-01 PROCEDURE — 1036F TOBACCO NON-USER: CPT | Performed by: INTERNAL MEDICINE

## 2024-03-01 PROCEDURE — 1124F ACP DISCUSS-NO DSCNMKR DOCD: CPT | Performed by: INTERNAL MEDICINE

## 2024-03-01 PROCEDURE — 1157F ADVNC CARE PLAN IN RCRD: CPT | Performed by: INTERNAL MEDICINE

## 2024-03-01 PROCEDURE — 99214 OFFICE O/P EST MOD 30 MIN: CPT | Performed by: INTERNAL MEDICINE

## 2024-03-01 PROCEDURE — 1126F AMNT PAIN NOTED NONE PRSNT: CPT | Performed by: INTERNAL MEDICINE

## 2024-03-01 ASSESSMENT — COLUMBIA-SUICIDE SEVERITY RATING SCALE - C-SSRS
1. IN THE PAST MONTH, HAVE YOU WISHED YOU WERE DEAD OR WISHED YOU COULD GO TO SLEEP AND NOT WAKE UP?: NO
2. HAVE YOU ACTUALLY HAD ANY THOUGHTS OF KILLING YOURSELF?: NO
6. HAVE YOU EVER DONE ANYTHING, STARTED TO DO ANYTHING, OR PREPARED TO DO ANYTHING TO END YOUR LIFE?: NO

## 2024-03-01 ASSESSMENT — ENCOUNTER SYMPTOMS
OCCASIONAL FEELINGS OF UNSTEADINESS: 0
LOSS OF SENSATION IN FEET: 0
DEPRESSION: 0

## 2024-03-01 ASSESSMENT — PATIENT HEALTH QUESTIONNAIRE - PHQ9
SUM OF ALL RESPONSES TO PHQ9 QUESTIONS 1 AND 2: 0
2. FEELING DOWN, DEPRESSED OR HOPELESS: NOT AT ALL
1. LITTLE INTEREST OR PLEASURE IN DOING THINGS: NOT AT ALL

## 2024-03-01 NOTE — PROGRESS NOTES
Subjective   Patient ID: Mohit Villatoro is a 68 y.o. male who presents for FUV.    HPI  Patient in for a visit  Blood pressure is good but weight went up , exercising stationary bike and stretching   Stream urine flow is slowing will see liseth Leon again  [place] trip in October    Review of Systems  General: Denies fever, chills, night sweats,  Eyes: Negative for recent visual changes  Ears, Nose, Throat :  Negative for hearing changes, sinus discomfort  Dermatologic: Negative for new skin conditions, rash  Respiratory: Negative for wheezing, shortness of breath, cough  Cardiovascular: Negative for chest pain, palpitations, or leg swelling  Gastrointestinal: Negative for nausea/vomiting, abdominal pain, changes in bowel habits  Genitourinary NEGATIVE FOR URINARY INCONTINENCE urgency , frequency, discomfort   Musculoskeletal: see hpi  Neurological: Negative for headaches, dizziness    Previous history  Past Medical History:   Diagnosis Date    Abnormal finding of blood chemistry, unspecified 11/30/2022    Abnormal blood chemistry    Adjustment disorder, unspecified 12/17/2021    Adult situational stress disorder    Allergic rhinitis, unspecified 09/01/2017    Allergic rhinitis    Benign prostatic hyperplasia without lower urinary tract symptoms 12/16/2022    BPH (benign prostatic hyperplasia)    Encounter for general adult medical examination without abnormal findings 11/30/2022    Welcome to Medicare preventive visit    Encounter for screening for malignant neoplasm of prostate 10/12/2022    Screening for prostate cancer    Nocturia 09/01/2017    Nocturia    Obstructive sleep apnea syndrome 10/04/2023    Other abnormality of red blood cells 10/12/2022    Elevated MCV    Pain in right knee 01/05/2023    Bilateral knee pain    Poor urinary stream 12/16/2022    Weak urine stream    Pure hypercholesterolemia, unspecified 10/12/2022    Elevated LDL cholesterol level    Sleep disorder, unspecified 04/09/2021    Sleep  disturbance    Unspecified abnormal findings in urine 09/10/2020    Urine abnormality    Unspecified skin changes 04/26/2021    Skin change     Past Surgical History:   Procedure Laterality Date    LASER OF PROSTATE W/ GREEN LIGHT PVP      Holmium laser of prostate(not green light)    OTHER SURGICAL HISTORY  11/27/2018    Hernia repair     Social History     Tobacco Use    Smoking status: Former     Packs/day: 0.50     Years: 20.00     Additional pack years: 0.00     Total pack years: 10.00     Types: Cigarettes    Smokeless tobacco: Never    Tobacco comments:     Quit smoking cigarettes 1997, smoked 0.5 ppd x 20 yrs   Vaping Use    Vaping Use: Never used   Substance Use Topics    Alcohol use: Yes     Alcohol/week: 4.0 standard drinks of alcohol     Types: 4 Glasses of wine per week     Comment: occasionally 1-4    Drug use: Never     Family History   Problem Relation Name Age of Onset    Hypertension Mother      Diabetes Sister      Hashimoto's thyroiditis Sister      Other (cardiac disorder) Other      Other (allergy) Other      Hypertension Other       Allergies   Allergen Reactions    Fluoxetine Hcl Unknown     sex dysfunction     Current Outpatient Medications   Medication Instructions    amoxicillin (Amoxil) 500 mg capsule Take 4 Tablets 1 Hour Prior to Dental Procedure or Cleaning.    buPROPion SR (WELLBUTRIN SR) 100 mg, oral, 2 times daily, Do not crush, chew, or split.    tadalafil (CIALIS) 20 mg, oral, Daily PRN    traZODone (DESYREL) 100 mg, oral, Nightly       Objective       Physical Exam  Vital Signs: as recorded above  General: Well groomed, well nourished   Orientation:  Alert , oriented to time, place , and person   Mood and Affect:  Cooperative , no apparent distress normal affect  Skin: Good color, good turgor  Eyes: Extra ocular muscle movements intact, anicteric sclerae  Neck: Supple, full range of movement  Chest: Normal breath sounds, normal chest wall exam, symmetric, good air entry, clear to  auscultation  Heart: Regular rate and rhythm, without murmur, gallop, or rubs  BACK:  no CTLS spine tenderness, no flank tenderness  Extremities: full range of movement  bilateral UE and bilateral LE,  no lower extremity edema  Neurological: Alert, oriented, cranial nerves II-XII grossly intact except for visual acuity  Sensation:  Intact   Gait: normal steady      Assessment/Plan   Mohit Villatoro is a 68 y.o. male who presents for the concerns below:    Problem List Items Addressed This Visit    None    Non pitting edema no added salt to diet , hydration along with stiffness of joint ,      URINARY STREAM CHANGES had seen Dr Leon will consider ffup if this continues     HYPERGLYCEMIA  PLAN: Follow low carbohydrate diet, will consult nutrition if needed,  exercise as discussed, weight control. Start/Continue medications or adjust accordingly. Obtain HbA1c, BMP, urine microalbumin, ophthalmology exam    HYPERGLYCEMIA PLAN:Stable without  medications currently  , continue to follow Diabetic diet,  urine microalbumin utd , ophthalmology exam.  Need Podiatry checks periodically.      Discussed with:   Return in : 5 months recheck weight recheck     Portions of this note were generated using digital voice recognition software, and may contain grammatical errors       Valentin Vazquez MD  03/01/24  9:14 AM

## 2024-04-19 ENCOUNTER — APPOINTMENT (OUTPATIENT)
Dept: PRIMARY CARE | Facility: CLINIC | Age: 69
End: 2024-04-19
Payer: MEDICARE

## 2024-06-19 ENCOUNTER — APPOINTMENT (OUTPATIENT)
Dept: UROLOGY | Facility: CLINIC | Age: 69
End: 2024-06-19
Payer: MEDICARE

## 2024-06-19 VITALS — HEIGHT: 71 IN | BODY MASS INDEX: 27.06 KG/M2 | TEMPERATURE: 97.8 F

## 2024-06-19 DIAGNOSIS — N13.8 BPH WITH OBSTRUCTION/LOWER URINARY TRACT SYMPTOMS: Primary | ICD-10-CM

## 2024-06-19 DIAGNOSIS — N40.1 BPH WITH OBSTRUCTION/LOWER URINARY TRACT SYMPTOMS: Primary | ICD-10-CM

## 2024-06-19 DIAGNOSIS — Z79.2 NEED FOR PROPHYLACTIC ANTIBIOTIC: ICD-10-CM

## 2024-06-19 DIAGNOSIS — N40.0 BENIGN PROSTATIC HYPERPLASIA, UNSPECIFIED WHETHER LOWER URINARY TRACT SYMPTOMS PRESENT: ICD-10-CM

## 2024-06-19 LAB
POC APPEARANCE, URINE: CLEAR
POC BILIRUBIN, URINE: NEGATIVE
POC BLOOD, URINE: NEGATIVE
POC COLOR, URINE: YELLOW
POC GLUCOSE, URINE: NEGATIVE MG/DL
POC KETONES, URINE: NEGATIVE MG/DL
POC LEUKOCYTES, URINE: NEGATIVE
POC NITRITE,URINE: NEGATIVE
POC PH, URINE: 5 PH
POC PROTEIN, URINE: NEGATIVE MG/DL
POC SPECIFIC GRAVITY, URINE: >=1.03
POC UROBILINOGEN, URINE: 0.2 EU/DL

## 2024-06-19 PROCEDURE — 51798 US URINE CAPACITY MEASURE: CPT | Performed by: UROLOGY

## 2024-06-19 PROCEDURE — 51741 ELECTRO-UROFLOWMETRY FIRST: CPT | Performed by: UROLOGY

## 2024-06-19 PROCEDURE — 1036F TOBACCO NON-USER: CPT | Performed by: UROLOGY

## 2024-06-19 PROCEDURE — 1126F AMNT PAIN NOTED NONE PRSNT: CPT | Performed by: UROLOGY

## 2024-06-19 PROCEDURE — 81003 URINALYSIS AUTO W/O SCOPE: CPT | Performed by: UROLOGY

## 2024-06-19 PROCEDURE — 99214 OFFICE O/P EST MOD 30 MIN: CPT | Performed by: UROLOGY

## 2024-06-19 PROCEDURE — 1159F MED LIST DOCD IN RCRD: CPT | Performed by: UROLOGY

## 2024-06-19 PROCEDURE — 1157F ADVNC CARE PLAN IN RCRD: CPT | Performed by: UROLOGY

## 2024-06-19 RX ORDER — CEPHALEXIN 500 MG/1
500 CAPSULE ORAL ONCE
Qty: 1 CAPSULE | Refills: 0 | Status: SHIPPED | OUTPATIENT
Start: 2024-06-19 | End: 2024-06-19

## 2024-06-19 ASSESSMENT — PAIN SCALES - GENERAL: PAINLEVEL: 0-NO PAIN

## 2024-06-19 NOTE — PROGRESS NOTES
Subjective   Mohit Villatoro is a 68 y.o. male with history of ED, BPH with LUTs s/p HoLEP on 04/13/2023 and BNC s/p bladder neck incision on 10/19/2023. Presenting today for a follow up visit. Patient reports recurrence of obstructive and irritative urinary symptoms since bladder neck incision. He has slowing of urinary stream, sensation of incomplete bladder emptying, urinary frequency and urgency and nocturia x2. He reports good response to Cialis for ED.  Denies any recent gross hematuria, fevers, chills, urinary retention, intractable flank or abdominal pain, nausea or vomiting.                Past Medical History:   Diagnosis Date    Abnormal finding of blood chemistry, unspecified 11/30/2022    Abnormal blood chemistry    Adjustment disorder, unspecified 12/17/2021    Adult situational stress disorder    Allergic rhinitis, unspecified 09/01/2017    Allergic rhinitis    Benign prostatic hyperplasia without lower urinary tract symptoms 12/16/2022    BPH (benign prostatic hyperplasia)    Encounter for general adult medical examination without abnormal findings 11/30/2022    Welcome to Medicare preventive visit    Encounter for screening for malignant neoplasm of prostate 10/12/2022    Screening for prostate cancer    Nocturia 09/01/2017    Nocturia    Obstructive sleep apnea syndrome 10/04/2023    Other abnormality of red blood cells 10/12/2022    Elevated MCV    Pain in right knee 01/05/2023    Bilateral knee pain    Poor urinary stream 12/16/2022    Weak urine stream    Pure hypercholesterolemia, unspecified 10/12/2022    Elevated LDL cholesterol level    Sleep disorder, unspecified 04/09/2021    Sleep disturbance    Unspecified abnormal findings in urine 09/10/2020    Urine abnormality    Unspecified skin changes 04/26/2021    Skin change     Past Surgical History:   Procedure Laterality Date    LASER OF PROSTATE W/ GREEN LIGHT PVP      Holmium laser of prostate(not green light)    OTHER SURGICAL HISTORY   11/27/2018    Hernia repair     Family History   Problem Relation Name Age of Onset    Hypertension Mother      Diabetes Sister      Hashimoto's thyroiditis Sister      Other (cardiac disorder) Other      Other (allergy) Other      Hypertension Other       Current Outpatient Medications   Medication Sig Dispense Refill    buPROPion SR (Wellbutrin SR) 100 mg 12 hr tablet Take 1 tablet (100 mg) by mouth 2 times a day. Do not crush, chew, or split. 180 tablet 1    tadalafil (Cialis) 20 mg tablet Take 1 tablet (20 mg) by mouth once daily as needed for erectile dysfunction. 30 tablet 8    traZODone (Desyrel) 100 mg tablet Take 1 tablet (100 mg) by mouth once daily at bedtime. 90 tablet 2     No current facility-administered medications for this visit.     Allergies   Allergen Reactions    Fluoxetine Hcl Unknown     sex dysfunction     Social History     Socioeconomic History    Marital status:      Spouse name: Not on file    Number of children: Not on file    Years of education: Not on file    Highest education level: Not on file   Occupational History     Employer: [employer]   Tobacco Use    Smoking status: Former     Current packs/day: 0.50     Average packs/day: 0.5 packs/day for 20.0 years (10.0 ttl pk-yrs)     Types: Cigarettes    Smokeless tobacco: Never    Tobacco comments:     Quit smoking cigarettes 1997, smoked 0.5 ppd x 20 yrs   Vaping Use    Vaping status: Never Used   Substance and Sexual Activity    Alcohol use: Yes     Alcohol/week: 4.0 standard drinks of alcohol     Types: 4 Glasses of wine per week     Comment: occasionally 1-4    Drug use: Never    Sexual activity: Not on file   Other Topics Concern    Not on file   Social History Narrative    Not on file     Social Determinants of Health     Financial Resource Strain: Not on file   Food Insecurity: Not on file   Transportation Needs: No Transportation Needs (11/7/2023)    OASIS : Transportation     Lack of Transportation  (Medical): No     Lack of Transportation (Non-Medical): No     Patient Unable or Declines to Respond: No   Physical Activity: Not on file   Stress: Not on file   Social Connections: Feeling Somewhat Isolated (11/7/2023)    OASIS : Social Isolation     Frequency of experiencing loneliness or isolation: Sometimes   Intimate Partner Violence: Not on file   Housing Stability: Not on file       Review of Systems  Pertinent items are noted in HPI.    Objective       Lab Review  Lab Results   Component Value Date    WBC 6.6 12/04/2023    RBC 4.79 12/04/2023    HGB 14.7 12/04/2023    HCT 44.1 12/04/2023     12/04/2023      Lab Results   Component Value Date    BUN 17 12/04/2023    CREATININE 0.82 12/04/2023      Lab Results   Component Value Date    PSA 0.45 07/25/2023   IPSS 21 and 3   Uroflow was poorly diagnostic, only voided 13cc's  PVR 0ml   Urine analysis shows negative       Assessment/Plan   Diagnoses and all orders for this visit:  BPH with obstruction/lower urinary tract symptoms  -     Measure post void residual  -     POCT UA Automated manually resulted  -     Cystourethroscopy; Future  -     Urine flow measurement  Benign prostatic hyperplasia, unspecified whether lower urinary tract symptoms present  Need for prophylactic antibiotic       BPH s/p HoLEP 04/13/2023   BNC s/p bladder neck incision on 10/19/2023.     Patient has recurrence of irritative and obstructive voiding symptoms.     We will proceed with cystoscopy for further evaluation and to r/o BNC.     If there is a recurrence of BNC we will refer patient to Dr. Davison for further management.     3. ED - good response to Cialis. We will refill this for 1 year.     All questions were answered to the patient's satisfaction. Patient agrees with the plan and wishes to proceed. Follow-up will be scheduled appropriately.     E&M visit today is associated with current or anticipated ongoing medical care services related to a patient's single,  serious condition or a complex condition.    Scribed for Dr. Leon by Dena Benitez. I , Dr Leon, have personally reviewed and agreed with the information entered by the Virtual Scribe.

## 2024-06-21 ENCOUNTER — TELEPHONE (OUTPATIENT)
Dept: DERMATOLOGY | Facility: CLINIC | Age: 69
End: 2024-06-21

## 2024-06-21 NOTE — TELEPHONE ENCOUNTER
MENDY 6/21 at 8:22am; MGM will be out of the office 6/24.  We will call him back to reschedule once we have access

## 2024-06-24 ENCOUNTER — APPOINTMENT (OUTPATIENT)
Dept: DERMATOLOGY | Facility: CLINIC | Age: 69
End: 2024-06-24
Payer: MEDICARE

## 2024-07-17 ENCOUNTER — APPOINTMENT (OUTPATIENT)
Dept: UROLOGY | Facility: CLINIC | Age: 69
End: 2024-07-17
Payer: MEDICARE

## 2024-07-17 VITALS
HEART RATE: 72 BPM | DIASTOLIC BLOOD PRESSURE: 70 MMHG | HEIGHT: 71 IN | SYSTOLIC BLOOD PRESSURE: 115 MMHG | WEIGHT: 194 LBS | TEMPERATURE: 96.4 F | BODY MASS INDEX: 27.16 KG/M2

## 2024-07-17 DIAGNOSIS — N32.0 BLADDER NECK CONTRACTURE: Primary | ICD-10-CM

## 2024-07-17 PROCEDURE — 52000 CYSTOURETHROSCOPY: CPT | Performed by: UROLOGY

## 2024-07-17 PROCEDURE — 99214 OFFICE O/P EST MOD 30 MIN: CPT | Performed by: UROLOGY

## 2024-07-17 ASSESSMENT — PAIN SCALES - GENERAL: PAINLEVEL: 0-NO PAIN

## 2024-07-17 NOTE — PROGRESS NOTES
Subjective   Mohit Villatoro is a 68 y.o. male with history of ED, BPH with LUTs s/p HoLEP on 04/13/2023 and BNC s/p bladder neck incision on 10/19/2023. Presenting today for a follow up visit. Patient reports recurrence of obstructive and irritative urinary symptoms since bladder neck incision. He has slowing of urinary stream, sensation of incomplete bladder emptying, urinary frequency and urgency and nocturia x2.          Past Medical History:   Diagnosis Date    Abnormal finding of blood chemistry, unspecified 11/30/2022    Abnormal blood chemistry    Adjustment disorder, unspecified 12/17/2021    Adult situational stress disorder    Allergic rhinitis, unspecified 09/01/2017    Allergic rhinitis    Benign prostatic hyperplasia without lower urinary tract symptoms 12/16/2022    BPH (benign prostatic hyperplasia)    Encounter for general adult medical examination without abnormal findings 11/30/2022    Welcome to Medicare preventive visit    Encounter for screening for malignant neoplasm of prostate 10/12/2022    Screening for prostate cancer    Nocturia 09/01/2017    Nocturia    Obstructive sleep apnea syndrome 10/04/2023    Other abnormality of red blood cells 10/12/2022    Elevated MCV    Pain in right knee 01/05/2023    Bilateral knee pain    Poor urinary stream 12/16/2022    Weak urine stream    Pure hypercholesterolemia, unspecified 10/12/2022    Elevated LDL cholesterol level    Sleep disorder, unspecified 04/09/2021    Sleep disturbance    Unspecified abnormal findings in urine 09/10/2020    Urine abnormality    Unspecified skin changes 04/26/2021    Skin change     Past Surgical History:   Procedure Laterality Date    LASER OF PROSTATE W/ GREEN LIGHT PVP      Holmium laser of prostate(not green light)    OTHER SURGICAL HISTORY  11/27/2018    Hernia repair     Family History   Problem Relation Name Age of Onset    Hypertension Mother      Diabetes Sister      Hashimoto's thyroiditis Sister      Other  (cardiac disorder) Other      Other (allergy) Other      Hypertension Other       Current Outpatient Medications   Medication Sig Dispense Refill    buPROPion SR (Wellbutrin SR) 100 mg 12 hr tablet Take 1 tablet (100 mg) by mouth 2 times a day. Do not crush, chew, or split. 180 tablet 1    tadalafil (Cialis) 20 mg tablet Take 1 tablet (20 mg) by mouth once daily as needed for erectile dysfunction. 30 tablet 8    traZODone (Desyrel) 100 mg tablet Take 1 tablet (100 mg) by mouth once daily at bedtime. 90 tablet 2     No current facility-administered medications for this visit.     Allergies   Allergen Reactions    Fluoxetine Hcl Unknown     sex dysfunction     Social History     Socioeconomic History    Marital status:      Spouse name: Not on file    Number of children: Not on file    Years of education: Not on file    Highest education level: Not on file   Occupational History     Employer: [employer]   Tobacco Use    Smoking status: Former     Current packs/day: 0.50     Average packs/day: 0.5 packs/day for 20.0 years (10.0 ttl pk-yrs)     Types: Cigarettes    Smokeless tobacco: Never    Tobacco comments:     Quit smoking cigarettes 1997, smoked 0.5 ppd x 20 yrs   Vaping Use    Vaping status: Never Used   Substance and Sexual Activity    Alcohol use: Yes     Alcohol/week: 4.0 standard drinks of alcohol     Types: 4 Glasses of wine per week     Comment: occasionally 1-4    Drug use: Never    Sexual activity: Not on file   Other Topics Concern    Not on file   Social History Narrative    Not on file     Social Determinants of Health     Financial Resource Strain: Not on file   Food Insecurity: Not on file   Transportation Needs: No Transportation Needs (11/7/2023)    OASIS : Transportation     Lack of Transportation (Medical): No     Lack of Transportation (Non-Medical): No     Patient Unable or Declines to Respond: No   Physical Activity: Not on file   Stress: Not on file   Social Connections:  Feeling Somewhat Isolated (11/7/2023)    OASIS : Social Isolation     Frequency of experiencing loneliness or isolation: Sometimes   Intimate Partner Violence: Not on file   Housing Stability: Not on file       Review of Systems  Pertinent items are noted in HPI.    Objective     Cystoscopy performed:   Mohit Villatoro identified using two (2) forms of identification.  Procedure: diagnostic cystourethroscopy  Indications for procedure: BPH s/p HoLEP 04/13/2023    Risks, benefits, and alternatives were discussed in detail.   Patient appears to understand and agrees to proceed.   Patient has signed the procedure consent form.     Cystoscopy findings:  Urethra: normal course and caliber, no evidence of stricture or lesion.  Prostate: prostatic fossa was wide open.   Bladder: mild bladder neck contracture, unable to negotiate the cystoscope through.    Post-cystoscopy: Patient tolerated procedure without complications.            Lab Review  Lab Results   Component Value Date    WBC 6.6 12/04/2023    RBC 4.79 12/04/2023    HGB 14.7 12/04/2023    HCT 44.1 12/04/2023     12/04/2023      Lab Results   Component Value Date    BUN 17 12/04/2023    CREATININE 0.82 12/04/2023      Lab Results   Component Value Date    PSA 0.45 07/25/2023       Assessment/Plan   There are no diagnoses linked to this encounter.     BPH s/p HoLEP 04/13/2023   BNC s/p bladder neck incision on 10/19/2023.     On cystoscopy today there was a mild bladder neck contracture, I was unable to negotiate the cystoscope through.  The rest of the prostatic fossa was wide open.     We will refer patient to Dr. Davison for further resection due to second reoccurrence of BNC. Risks discussed.     All questions were answered to the patient's satisfaction. Patient agrees with the plan and wishes to proceed. Follow-up will be scheduled appropriately.     I spent 30 minutes of dedicated E&M time, including preparation and review of records, notes,  and data, time spent with patient/family, and documentation.     E&M visit today is associated with current or anticipated ongoing medical care services related to a patient's single, serious condition or a complex condition.    Scribed for Dr. Leon by Dena Benitez. I , Dr Leon, have personally reviewed and agreed with the information entered by the Virtual Scribe.

## 2024-07-25 ENCOUNTER — APPOINTMENT (OUTPATIENT)
Dept: PRIMARY CARE | Facility: CLINIC | Age: 69
End: 2024-07-25
Payer: MEDICARE

## 2024-07-25 VITALS
DIASTOLIC BLOOD PRESSURE: 72 MMHG | RESPIRATION RATE: 16 BRPM | BODY MASS INDEX: 27.06 KG/M2 | SYSTOLIC BLOOD PRESSURE: 118 MMHG | WEIGHT: 194 LBS

## 2024-07-25 DIAGNOSIS — R53.83 FATIGUE, UNSPECIFIED TYPE: Primary | ICD-10-CM

## 2024-07-25 PROCEDURE — 1159F MED LIST DOCD IN RCRD: CPT | Performed by: INTERNAL MEDICINE

## 2024-07-25 PROCEDURE — 1036F TOBACCO NON-USER: CPT | Performed by: INTERNAL MEDICINE

## 2024-07-25 PROCEDURE — 99214 OFFICE O/P EST MOD 30 MIN: CPT | Performed by: INTERNAL MEDICINE

## 2024-07-25 PROCEDURE — 1157F ADVNC CARE PLAN IN RCRD: CPT | Performed by: INTERNAL MEDICINE

## 2024-07-25 NOTE — PROGRESS NOTES
Subjective   Patient ID: Mohit Villatoro is a 68 y.o. male who presents for Follow-up (Pt says he has no poa or living will ).    HPI  Patient in for a visit  Getting burping indigestion  Does have stress , he eats pretty well, has had reflux before but not enough  Not constipated , stools looser side  Did cologuard in 2022 which was negative , last cscope told 5 years   Review of Systems    Previous history  Past Medical History:   Diagnosis Date    Abnormal finding of blood chemistry, unspecified 11/30/2022    Abnormal blood chemistry    Adjustment disorder, unspecified 12/17/2021    Adult situational stress disorder    Allergic rhinitis, unspecified 09/01/2017    Allergic rhinitis    Benign prostatic hyperplasia without lower urinary tract symptoms 12/16/2022    BPH (benign prostatic hyperplasia)    Encounter for general adult medical examination without abnormal findings 11/30/2022    Welcome to Medicare preventive visit    Encounter for screening for malignant neoplasm of prostate 10/12/2022    Screening for prostate cancer    Nocturia 09/01/2017    Nocturia    Obstructive sleep apnea syndrome 10/04/2023    Other abnormality of red blood cells 10/12/2022    Elevated MCV    Pain in right knee 01/05/2023    Bilateral knee pain    Poor urinary stream 12/16/2022    Weak urine stream    Pure hypercholesterolemia, unspecified 10/12/2022    Elevated LDL cholesterol level    Sleep disorder, unspecified 04/09/2021    Sleep disturbance    Unspecified abnormal findings in urine 09/10/2020    Urine abnormality    Unspecified skin changes 04/26/2021    Skin change     Past Surgical History:   Procedure Laterality Date    LASER OF PROSTATE W/ GREEN LIGHT PVP      Holmium laser of prostate(not green light)    OTHER SURGICAL HISTORY  11/27/2018    Hernia repair     Social History     Tobacco Use    Smoking status: Former     Current packs/day: 0.50     Average packs/day: 0.5 packs/day for 20.0 years (10.0 ttl pk-yrs)      Types: Cigarettes    Smokeless tobacco: Never    Tobacco comments:     Quit smoking cigarettes 1997, smoked 0.5 ppd x 20 yrs   Vaping Use    Vaping status: Never Used   Substance Use Topics    Alcohol use: Yes     Alcohol/week: 4.0 standard drinks of alcohol     Types: 4 Glasses of wine per week     Comment: occasionally 1-4    Drug use: Never     Family History   Problem Relation Name Age of Onset    Hypertension Mother      Diabetes Sister      Hashimoto's thyroiditis Sister      Other (cardiac disorder) Other      Other (allergy) Other      Hypertension Other       Allergies   Allergen Reactions    Fluoxetine Hcl Unknown     sex dysfunction     Current Outpatient Medications   Medication Instructions    buPROPion SR (WELLBUTRIN SR) 100 mg, oral, 2 times daily, Do not crush, chew, or split.    tadalafil (CIALIS) 20 mg, oral, Daily PRN    traZODone (DESYREL) 100 mg, oral, Nightly       Objective       Physical Exam  Vital Signs: as recorded above  General: Well groomed, well nourished   Orientation:  Alert , oriented to time, place , and person   Mood and Affect:  Cooperative , no apparent distress normal affect  Skin: Good color, good turgor  Eyes: Extra ocular muscle movements intact, anicteric sclerae  Neck: Supple, full range of movement  Chest: Normal breath sounds, normal chest wall exam, symmetric, good air entry, clear to auscultation  Heart: Regular rate and rhythm, without murmur, gallop, or rubs  Abdomen soft nontender no masses felt no hepatosplenomegaly, no rebound or guarding  BACK:  no CTLS spine tenderness, no flank tenderness  Extremities: full range of movement  bilateral UE and bilateral LE,  no lower extremity edema  Neurological: Alert, oriented, cranial nerves II-XII grossly intact except for visual acuity  Sensation:  Intact   Gait: normal steady      Assessment/Plan   Mohit Villatoro is a 68 y.o. male who presents for the concerns below:    Problem List Items Addressed This Visit     None    Abdominal pain occurs with any food intake benign abd exam   Will have him try pepcid or prilosec if not effective will prescribe   Trial gluten free diet , avoid acidic foods   STRESS MANAGEMENT:PLAN:  Patient symptoms stable with current medication, will update refills.  No adverse side effects  SLEEP DISTURBANCE PLAN:  goal is 7-9 hours of sleep contributes to optimal physical and brain health.    Sleep hygiene, establish routine and set fixed time to sleep, avoid napping during the day    Discussed with:   Return in :  2 months recheck  Portions of this note were generated using digital voice recognition software, and may contain grammatical errors       Valentin Vazquez MD  07/25/24  10:03 AM

## 2024-07-25 NOTE — PATIENT INSTRUCTIONS
Try over the counter pepcid or prilosec if this is not effective let me know will send prescription

## 2024-07-31 ENCOUNTER — APPOINTMENT (OUTPATIENT)
Dept: UROLOGY | Facility: CLINIC | Age: 69
End: 2024-07-31
Payer: MEDICARE

## 2024-07-31 DIAGNOSIS — N32.0 BLADDER NECK CONTRACTURE: Primary | ICD-10-CM

## 2024-07-31 PROCEDURE — 1036F TOBACCO NON-USER: CPT | Performed by: STUDENT IN AN ORGANIZED HEALTH CARE EDUCATION/TRAINING PROGRAM

## 2024-07-31 PROCEDURE — 1159F MED LIST DOCD IN RCRD: CPT | Performed by: STUDENT IN AN ORGANIZED HEALTH CARE EDUCATION/TRAINING PROGRAM

## 2024-07-31 PROCEDURE — 1160F RVW MEDS BY RX/DR IN RCRD: CPT | Performed by: STUDENT IN AN ORGANIZED HEALTH CARE EDUCATION/TRAINING PROGRAM

## 2024-07-31 PROCEDURE — 1157F ADVNC CARE PLAN IN RCRD: CPT | Performed by: STUDENT IN AN ORGANIZED HEALTH CARE EDUCATION/TRAINING PROGRAM

## 2024-07-31 PROCEDURE — 99204 OFFICE O/P NEW MOD 45 MIN: CPT | Performed by: STUDENT IN AN ORGANIZED HEALTH CARE EDUCATION/TRAINING PROGRAM

## 2024-07-31 RX ORDER — CEFAZOLIN SODIUM 2 G/100ML
2 INJECTION, SOLUTION INTRAVENOUS ONCE
OUTPATIENT
Start: 2024-07-31 | End: 2024-07-31

## 2024-07-31 RX ORDER — SODIUM CHLORIDE, SODIUM LACTATE, POTASSIUM CHLORIDE, CALCIUM CHLORIDE 600; 310; 30; 20 MG/100ML; MG/100ML; MG/100ML; MG/100ML
100 INJECTION, SOLUTION INTRAVENOUS CONTINUOUS
OUTPATIENT
Start: 2024-07-31

## 2024-07-31 NOTE — PROGRESS NOTES
Scribed for Dr. Rocco Davison by Isrrael Tee. I, Dr. Rocco Davison have personally reviewed and agreed with the information entered by the Virtual Scribe. This visit was completed via telemedicine. All issues as below were discussed and addressed but no physical exam was performed unless allowed by visual confirmation. If it was felt that the patient should be evaluated in clinic, then they were directed there. Patient verbal consented to the visit. 07/31/24.    ASSESSMENT:  Problem List Items Addressed This Visit       Bladder neck contracture - Primary    Relevant Orders    Case Request Operating Room: Incision Transurethral Bladder Neck (Completed)    Request for Pre-Admission Testing Visit    Basic Metabolic Panel    CBC    Urinalysis with Reflex Culture and Microscopic     BPH with LUTS ~ s/p HoLEP 04/13/2023   BNC ~ s/p bladder neck incision on 10/19/2023.     PLAN:  #BNC, recurrent  Discussed non-surgical vs surgical options.  Risks, benefits, and complications discussed.   Post-op course and expectations reviewed.   He elects to proceed with cystoscopy + BNI.  Tentative OR date of Sept 24th, 2024.     All questions were answered to the patient’s satisfaction.  Patient agrees with the plan and wishes to proceed.  Continue follow-up for ongoing care of his chronic medical conditions.       History of Present Illness (HPI):  Mohit presents virtually for a follow up visit.  The patient’s EMR has been reviewed.  Lives in [place], [place].     Hx of BPH with LUTS, s/p HoLEP (04/13/23), BNC s/p BNI (10/19/23).   Previously followed by Dr. Leon; patient is new to me.     Reports recurrent obstructive urinary symptoms since BNI.   C/o weak stream, sensation of incomplete emptying.   Associated w/ frequency, urgency, and nocturia x2.   S/p cystoscopy with Dr. Leon (07/17/24).   Noted mild BNC, unable to negotiate scope passed.   The rest of the prostatic fossa was widely patent.   Referred to me for continued care  and surgical consideration.     TODAY: (07/31/24)  Reports he has been doing well overall.   Predominantly c/o of gradually weakening stream.   Denotes significant benefit from BNI in the past.   Expressed concern of scar tissue formation given symptoms.   Denies any recent infections, gross hematuria, fevers or chills.     PMH: HLD, BRUCE.  PSH: HoLEP, hernia repair  FH: Denies FH of  malignancy.   SH: Former smoker.     Past Medical History:   Diagnosis Date    Abnormal finding of blood chemistry, unspecified 11/30/2022    Abnormal blood chemistry    Adjustment disorder, unspecified 12/17/2021    Adult situational stress disorder    Allergic rhinitis, unspecified 09/01/2017    Allergic rhinitis    Benign prostatic hyperplasia without lower urinary tract symptoms 12/16/2022    BPH (benign prostatic hyperplasia)    Encounter for general adult medical examination without abnormal findings 11/30/2022    Welcome to Medicare preventive visit    Encounter for screening for malignant neoplasm of prostate 10/12/2022    Screening for prostate cancer    Nocturia 09/01/2017    Nocturia    Obstructive sleep apnea syndrome 10/04/2023    Other abnormality of red blood cells 10/12/2022    Elevated MCV    Pain in right knee 01/05/2023    Bilateral knee pain    Poor urinary stream 12/16/2022    Weak urine stream    Pure hypercholesterolemia, unspecified 10/12/2022    Elevated LDL cholesterol level    Sleep disorder, unspecified 04/09/2021    Sleep disturbance    Unspecified abnormal findings in urine 09/10/2020    Urine abnormality    Unspecified skin changes 04/26/2021    Skin change     Past Surgical History:   Procedure Laterality Date    LASER OF PROSTATE W/ GREEN LIGHT PVP      Holmium laser of prostate(not green light)    OTHER SURGICAL HISTORY  11/27/2018    Hernia repair     Family History   Problem Relation Name Age of Onset    Hypertension Mother      Diabetes Sister      Hashimoto's thyroiditis Sister      Other (cardiac  disorder) Other      Other (allergy) Other      Hypertension Other       Social History     Tobacco Use   Smoking Status Former    Current packs/day: 0.50    Average packs/day: 0.5 packs/day for 20.0 years (10.0 ttl pk-yrs)    Types: Cigarettes   Smokeless Tobacco Never   Tobacco Comments    Quit smoking cigarettes 1997, smoked 0.5 ppd x 20 yrs     Current Outpatient Medications   Medication Sig Dispense Refill    buPROPion SR (Wellbutrin SR) 100 mg 12 hr tablet Take 1 tablet (100 mg) by mouth 2 times a day. Do not crush, chew, or split. 180 tablet 1    tadalafil (Cialis) 20 mg tablet Take 1 tablet (20 mg) by mouth once daily as needed for erectile dysfunction. 30 tablet 8    traZODone (Desyrel) 100 mg tablet Take 1 tablet (100 mg) by mouth once daily at bedtime. 90 tablet 2     No current facility-administered medications for this visit.     Allergies   Allergen Reactions    Fluoxetine Hcl Unknown     sex dysfunction     Past medical, surgical, family and social history in the chart was reviewed and is accurate including any additions to what is in this HPI.    REVIEW OF SYSTEMS (ROS):   Constitutional: denies any unintentional weight loss or change in strength.  Integumentary: denies any rashes or pruritus.  Eyes: denies any double vision or eye pain.  Ear/Nose/Mouth/Throat: denies any nosebleeds or gum bleeds.  Cardiovascular: denies any chest pain or syncope.  Respiratory: denies hemoptysis.  Gastrointestinal: denies nausea or vomiting.  Musculoskeletal: denies muscle cramping or weakness.  Neurologic: denies convulsions or seizures.  Hematologic/Lymphatic: denies bleeding tendencies.  Endocrine: denies heat/cold intolerance.  All other systems have been reviewed and are negative unless otherwise noted in the HPI.     OBJECTIVE:    PHYSICAL EXAM:  PHYSICAL EXAM LIMITED 2/2 BEING A TELEHEALTH VISIT.     Labs and imaging:  Lab Results   Component Value Date    WBC 6.6 12/04/2023    HGB 14.7 12/04/2023    HCT 44.1  12/04/2023     12/04/2023    CHOL 151 12/04/2023    TRIG 107 12/04/2023    HDL 59.0 12/04/2023    ALT 11 12/04/2023    AST 12 12/04/2023     12/04/2023    K 4.3 12/04/2023     12/04/2023    CREATININE 0.82 12/04/2023    BUN 17 12/04/2023    CO2 24 12/04/2023    TSH 1.84 12/04/2023    PSA 0.45 07/25/2023    INR 1.0 04/04/2023    HGBA1C 5.3 12/04/2023       Scribed for Dr. Rocco Davison by Isrrael Tee.  I, Dr. Rocco Davison have personally reviewed and agreed with the information entered by the Virtual Scribe. 07/31/24

## 2024-09-04 ENCOUNTER — APPOINTMENT (OUTPATIENT)
Dept: PREADMISSION TESTING | Facility: HOSPITAL | Age: 69
End: 2024-09-04
Payer: MEDICARE

## 2024-09-10 NOTE — H&P (VIEW-ONLY)
Patient is an alert and oriented 68 year old male scheduled for a  Incision Transurethral Bladder Neck on 9/24/2024 with Dr. Davison for  Bladder Neck Constricture.  The patient denies hematuria, dysuria, burning with urination, fever, chills, n/v/d.  No lower abdominal, back or flank pain.  Does report some frequency and urgency.     PMHX includes BPH w/ LUTS, Cataracts, BRUCE.      Presents to [facility] today for perioperative risk stratification and optimization.      Review of Systems   Constitutional: Negative for chills, decreased appetite, diaphoresis, fever and malaise/fatigue.   Eyes:  Negative for blurred vision and double vision.   Cardiovascular:  Negative for chest pain, claudication, cyanosis, dyspnea on exertion, irregular heartbeat, leg swelling, near-syncope and palpitations.   Respiratory:  Negative for cough, hemoptysis, shortness of breath and wheezing.    Endocrine: Negative for cold intolerance, heat intolerance, polydipsia, polyphagia and polyuria.   Gastrointestinal:  Negative for abdominal pain, constipation, diarrhea, dysphagia, nausea and vomiting.   Genitourinary:  Negative for bladder incontinence, dysuria, hematuria, incomplete emptying, nocturia, pelvic pain and urgency.   Neurological:  Negative for headaches, light-headedness, paresthesias, sensory change and weakness.   Psychiatric/Behavioral:  Negative for altered mental status.       Vitals and nursing note reviewed.     Physical exam  Constitutional:       Appearance: Normal appearance. He is normal weight.   HENT:      Head: Normocephalic and atraumatic.      Mouth/Throat:      Mouth: Mucous membranes are moist.      Pharynx: Oropharynx is clear.   Eyes:      Extraocular Movements: Extraocular movements intact.      Conjunctiva/sclera: Conjunctivae normal.      Pupils: Pupils are equal, round, and reactive to light.   Cardiovascular:      Rate and Rhythm: Normal rate and regular rhythm.      Pulses: Normal pulses.      Heart  sounds: Normal heart sounds.      No audible carotid bruit  Pulmonary:      Effort: Pulmonary effort is normal.      Breath sounds: Normal breath sounds.   Abdominal:      General: Abdomen is flat. Bowel sounds are normal.      Palpations: Abdomen is soft.   Musculoskeletal:      Cervical back: Normal range of motion and neck supple.   Skin:     General: Skin is warm and dry.      Capillary Refill: Capillary refill takes less than 2 seconds.   Neurological:      General: No focal deficit present.      Mental Status: He is alert and oriented to person, place, and time. Mental status is at baseline.   Psychiatric:         Mood and Affect: Mood normal.         Behavior: Behavior normal.         Thought Content: Thought content normal.         Judgment: Judgment normal.     Vitals and nursing note reviewed. Physical exam within normal limits.   Vitals:    09/11/24 0717   BP: 130/69   Pulse: 75   Resp: 16   Temp: 36.6 °C (97.9 °F)   SpO2: 97%     Assessment & Plan:    Neuro:  No diagnosis or significant findings on chart review or clinical presentation and evaluation.     HEENT/Airway:  No diagnosis or significant findings on chart review or clinical presentation and evaluation.   STOP-BANG Score 4    Mallampati::  II    TM distance::  >3 FB    Neck ROM::  Full  Mouth opening 3  Has dental crowns    Cardiovascular:  No diagnosis or significant findings on chart review or clinical presentation and evaluation.   METS: 7.44  RCRI: 0 points, 3.9%  risk for postoperative MACE   YAYA: 0.1% risk for postoperative MACE  EKG - not indicated    Pulmonary:  BRUCE  ARISCAT: <26 points, 1.6% risk of in-hospital postoperative pulmonary complication  PRODIGY: High risk for opioid induced respiratory depression  Smoking History-quit 20 years ago smoked 2 packs per week intermittently x 20 years   deep breathing handout given    Renal  Bladder Neck Constricture  Incision Transurethral Bladder Neck    BPH w/ LUTS  Managed with tadalafil  20mg every day prn    The patient is at increased risk of perioperative renal complications secondary to age>/= 58 and male sex. Preventative measures include  BMP  & UA ordered by Dr. Davison    Endocrine:  No diagnosis or significant findings on chart review or clinical presentation and evaluation.     Hematologic:  CBC ordered Dr. Scarberru Caprini Score 4, patient at Moderate risk for postoperative DVT. Pt supplied education/VTE handout    Gastrointestinal:   No diagnosis or significant findings on chart review or clinical presentation and evaluation.   Alcohol use-social  Drug use-occasional gummy    Infectious disease:   No diagnosis or significant findings on chart review or clinical presentation and evaluation.     Musculoskeletal:   No diagnosis or significant findings on chart review or clinical presentation and evaluation.   JHFRAT score 4 low falls risk    Anesthesia:  No anesthesia complications  Family history of anesthesia complications none    Labs & Imaging ordered:  CBC, CMP,   Nickel/metal allergy-none  Shellfish allergy-none    Overall, patient at low risk for the scheduled surgery. Discussed with patient medication instructions, NPO guidelines, and any questions or concerns. Patient needs no further workup prior to preceding with elective surgery.     Face to face time-30 minutes  Kayce Crystal, CNP

## 2024-09-10 NOTE — CPM/PAT H&P
Patient is an alert and oriented 68 year old male scheduled for a  Incision Transurethral Bladder Neck on 9/24/2024 with Dr. Davison for  Bladder Neck Constricture.  The patient denies hematuria, dysuria, burning with urination, fever, chills, n/v/d.  No lower abdominal, back or flank pain.  Does report some frequency and urgency.     PMHX includes BPH w/ LUTS, Cataracts, BRUCE.      Presents to [facility] today for perioperative risk stratification and optimization.      Review of Systems   Constitutional: Negative for chills, decreased appetite, diaphoresis, fever and malaise/fatigue.   Eyes:  Negative for blurred vision and double vision.   Cardiovascular:  Negative for chest pain, claudication, cyanosis, dyspnea on exertion, irregular heartbeat, leg swelling, near-syncope and palpitations.   Respiratory:  Negative for cough, hemoptysis, shortness of breath and wheezing.    Endocrine: Negative for cold intolerance, heat intolerance, polydipsia, polyphagia and polyuria.   Gastrointestinal:  Negative for abdominal pain, constipation, diarrhea, dysphagia, nausea and vomiting.   Genitourinary:  Negative for bladder incontinence, dysuria, hematuria, incomplete emptying, nocturia, pelvic pain and urgency.   Neurological:  Negative for headaches, light-headedness, paresthesias, sensory change and weakness.   Psychiatric/Behavioral:  Negative for altered mental status.       Vitals and nursing note reviewed.     Physical exam  Constitutional:       Appearance: Normal appearance. He is normal weight.   HENT:      Head: Normocephalic and atraumatic.      Mouth/Throat:      Mouth: Mucous membranes are moist.      Pharynx: Oropharynx is clear.   Eyes:      Extraocular Movements: Extraocular movements intact.      Conjunctiva/sclera: Conjunctivae normal.      Pupils: Pupils are equal, round, and reactive to light.   Cardiovascular:      Rate and Rhythm: Normal rate and regular rhythm.      Pulses: Normal pulses.      Heart  sounds: Normal heart sounds.      No audible carotid bruit  Pulmonary:      Effort: Pulmonary effort is normal.      Breath sounds: Normal breath sounds.   Abdominal:      General: Abdomen is flat. Bowel sounds are normal.      Palpations: Abdomen is soft.   Musculoskeletal:      Cervical back: Normal range of motion and neck supple.   Skin:     General: Skin is warm and dry.      Capillary Refill: Capillary refill takes less than 2 seconds.   Neurological:      General: No focal deficit present.      Mental Status: He is alert and oriented to person, place, and time. Mental status is at baseline.   Psychiatric:         Mood and Affect: Mood normal.         Behavior: Behavior normal.         Thought Content: Thought content normal.         Judgment: Judgment normal.     Vitals and nursing note reviewed. Physical exam within normal limits.   Vitals:    09/11/24 0717   BP: 130/69   Pulse: 75   Resp: 16   Temp: 36.6 °C (97.9 °F)   SpO2: 97%     Assessment & Plan:    Neuro:  No diagnosis or significant findings on chart review or clinical presentation and evaluation.     HEENT/Airway:  No diagnosis or significant findings on chart review or clinical presentation and evaluation.   STOP-BANG Score 4    Mallampati::  II    TM distance::  >3 FB    Neck ROM::  Full  Mouth opening 3  Has dental crowns    Cardiovascular:  No diagnosis or significant findings on chart review or clinical presentation and evaluation.   METS: 7.44  RCRI: 0 points, 3.9%  risk for postoperative MACE   YAYA: 0.1% risk for postoperative MACE  EKG - not indicated    Pulmonary:  BRUCE  ARISCAT: <26 points, 1.6% risk of in-hospital postoperative pulmonary complication  PRODIGY: High risk for opioid induced respiratory depression  Smoking History-quit 20 years ago smoked 2 packs per week intermittently x 20 years   deep breathing handout given    Renal  Bladder Neck Constricture  Incision Transurethral Bladder Neck    BPH w/ LUTS  Managed with tadalafil  20mg every day prn    The patient is at increased risk of perioperative renal complications secondary to age>/= 58 and male sex. Preventative measures include  BMP  & UA ordered by Dr. Davison    Endocrine:  No diagnosis or significant findings on chart review or clinical presentation and evaluation.     Hematologic:  CBC ordered Dr. Scarberru Caprini Score 4, patient at Moderate risk for postoperative DVT. Pt supplied education/VTE handout    Gastrointestinal:   No diagnosis or significant findings on chart review or clinical presentation and evaluation.   Alcohol use-social  Drug use-occasional gummy    Infectious disease:   No diagnosis or significant findings on chart review or clinical presentation and evaluation.     Musculoskeletal:   No diagnosis or significant findings on chart review or clinical presentation and evaluation.   JHFRAT score 4 low falls risk    Anesthesia:  No anesthesia complications  Family history of anesthesia complications none    Labs & Imaging ordered:  CBC, CMP,   Nickel/metal allergy-none  Shellfish allergy-none    Overall, patient at low risk for the scheduled surgery. Discussed with patient medication instructions, NPO guidelines, and any questions or concerns. Patient needs no further workup prior to preceding with elective surgery.     Face to face time-30 minutes  Kayce Crystal, CNP

## 2024-09-11 ENCOUNTER — PRE-ADMISSION TESTING (OUTPATIENT)
Dept: PREADMISSION TESTING | Facility: HOSPITAL | Age: 69
End: 2024-09-11
Payer: MEDICARE

## 2024-09-11 ENCOUNTER — LAB (OUTPATIENT)
Dept: LAB | Facility: LAB | Age: 69
End: 2024-09-11
Payer: MEDICARE

## 2024-09-11 VITALS
DIASTOLIC BLOOD PRESSURE: 69 MMHG | OXYGEN SATURATION: 97 % | TEMPERATURE: 97.9 F | HEIGHT: 71 IN | RESPIRATION RATE: 16 BRPM | WEIGHT: 200.84 LBS | HEART RATE: 75 BPM | BODY MASS INDEX: 28.12 KG/M2 | SYSTOLIC BLOOD PRESSURE: 130 MMHG

## 2024-09-11 DIAGNOSIS — N32.0 BLADDER NECK CONTRACTURE: ICD-10-CM

## 2024-09-11 DIAGNOSIS — R53.83 FATIGUE, UNSPECIFIED TYPE: ICD-10-CM

## 2024-09-11 LAB
ALBUMIN SERPL BCP-MCNC: 4 G/DL (ref 3.4–5)
ALP SERPL-CCNC: 57 U/L (ref 33–136)
ALT SERPL W P-5'-P-CCNC: 12 U/L (ref 10–52)
ANION GAP SERPL CALC-SCNC: 13 MMOL/L (ref 10–20)
APPEARANCE UR: CLEAR
AST SERPL W P-5'-P-CCNC: 11 U/L (ref 9–39)
BASOPHILS # BLD AUTO: 0.02 X10*3/UL (ref 0–0.1)
BASOPHILS NFR BLD AUTO: 0.3 %
BILIRUB SERPL-MCNC: 0.6 MG/DL (ref 0–1.2)
BILIRUB UR STRIP.AUTO-MCNC: NEGATIVE MG/DL
BUN SERPL-MCNC: 16 MG/DL (ref 6–23)
CALCIUM SERPL-MCNC: 9.2 MG/DL (ref 8.6–10.3)
CHLORIDE SERPL-SCNC: 106 MMOL/L (ref 98–107)
CO2 SERPL-SCNC: 26 MMOL/L (ref 21–32)
COLOR UR: YELLOW
CREAT SERPL-MCNC: 0.98 MG/DL (ref 0.5–1.3)
EGFRCR SERPLBLD CKD-EPI 2021: 85 ML/MIN/1.73M*2
EOSINOPHIL # BLD AUTO: 0.24 X10*3/UL (ref 0–0.7)
EOSINOPHIL NFR BLD AUTO: 4.2 %
ERYTHROCYTE [DISTWIDTH] IN BLOOD BY AUTOMATED COUNT: 13.7 % (ref 11.5–14.5)
GLUCOSE SERPL-MCNC: 175 MG/DL (ref 74–99)
GLUCOSE UR STRIP.AUTO-MCNC: NEGATIVE MG/DL
HCT VFR BLD AUTO: 42 % (ref 41–52)
HGB BLD-MCNC: 13.7 G/DL (ref 13.5–17.5)
HOLD SPECIMEN: NORMAL
IMM GRANULOCYTES # BLD AUTO: 0 X10*3/UL (ref 0–0.7)
IMM GRANULOCYTES NFR BLD AUTO: 0 % (ref 0–0.9)
KETONES UR STRIP.AUTO-MCNC: NEGATIVE MG/DL
LEUKOCYTE ESTERASE UR QL STRIP.AUTO: NEGATIVE
LYMPHOCYTES # BLD AUTO: 1.53 X10*3/UL (ref 1.2–4.8)
LYMPHOCYTES NFR BLD AUTO: 26.7 %
MCH RBC QN AUTO: 30.7 PG (ref 26–34)
MCHC RBC AUTO-ENTMCNC: 32.6 G/DL (ref 32–36)
MCV RBC AUTO: 94 FL (ref 80–100)
MONOCYTES # BLD AUTO: 0.4 X10*3/UL (ref 0.1–1)
MONOCYTES NFR BLD AUTO: 7 %
NEUTROPHILS # BLD AUTO: 3.53 X10*3/UL (ref 1.2–7.7)
NEUTROPHILS NFR BLD AUTO: 61.8 %
NITRITE UR QL STRIP.AUTO: NEGATIVE
NRBC BLD-RTO: ABNORMAL /100{WBCS}
PH UR STRIP.AUTO: 6 [PH]
PLATELET # BLD AUTO: 165 X10*3/UL (ref 150–450)
POTASSIUM SERPL-SCNC: 4.2 MMOL/L (ref 3.5–5.3)
PROT SERPL-MCNC: 5.6 G/DL (ref 6.4–8.2)
PROT UR STRIP.AUTO-MCNC: NEGATIVE MG/DL
RBC # BLD AUTO: 4.46 X10*6/UL (ref 4.5–5.9)
RBC # UR STRIP.AUTO: NEGATIVE /UL
SODIUM SERPL-SCNC: 141 MMOL/L (ref 136–145)
SP GR UR STRIP.AUTO: 1.02
UROBILINOGEN UR STRIP.AUTO-MCNC: 0.2 MG/DL
WBC # BLD AUTO: 5.7 X10*3/UL (ref 4.4–11.3)

## 2024-09-11 PROCEDURE — 85025 COMPLETE CBC W/AUTO DIFF WBC: CPT

## 2024-09-11 PROCEDURE — 81003 URINALYSIS AUTO W/O SCOPE: CPT

## 2024-09-11 PROCEDURE — 80053 COMPREHEN METABOLIC PANEL: CPT

## 2024-09-11 PROCEDURE — 36415 COLL VENOUS BLD VENIPUNCTURE: CPT

## 2024-09-11 PROCEDURE — 99203 OFFICE O/P NEW LOW 30 MIN: CPT | Performed by: NURSE PRACTITIONER

## 2024-09-11 ASSESSMENT — PAIN SCALES - GENERAL: PAINLEVEL_OUTOF10: 1

## 2024-09-11 ASSESSMENT — PAIN - FUNCTIONAL ASSESSMENT: PAIN_FUNCTIONAL_ASSESSMENT: 0-10

## 2024-09-11 NOTE — PREPROCEDURE INSTRUCTIONS
Medication List            Accurate as of September 11, 2024  7:24 AM. Always use your most recent med list.                buPROPion  mg 12 hr tablet  Commonly known as: Wellbutrin SR  Take 1 tablet (100 mg) by mouth 2 times a day. Do not crush, chew, or split.  Medication Adjustments for Surgery: Take on the morning of surgery     tadalafil 20 mg tablet  Commonly known as: Cialis  Take 1 tablet (20 mg) by mouth once daily as needed for erectile dysfunction.  Medication Adjustments for Surgery: Take last dose 3 days before surgery  Notes to patient: Last dose on 9/21/2024     traZODone 100 mg tablet  Commonly known as: Desyrel  Take 1 tablet (100 mg) by mouth once daily at bedtime.  Medication Adjustments for Surgery: Take/Use as prescribed            NPO Instructions:    Do not eat any food after midnight the night before your surgery/procedure.  You may have clear liquids until TWO hours before surgery/procedure. This includes water, black tea/coffee, (no milk or cream) apple juice and electrolyte drinks (Gatorade).  You may chew gum up to TWO hours before your surgery/procedure.    Additional Instructions:     Seven/Six Days before Surgery:  Review your medication instructions, stop indicated medications  Five Days before Surgery:  Review your medication instructions, stop indicated medications  Three Days before Surgery:  Review your medication instructions, stop indicated medications  The Day before Surgery:  Review your medication instructions, stop indicated medications  You will be contacted regarding the time of your arrival to facility and surgery time  Do not eat any food after Midnight  Day of Surgery:  Review your medication instructions, take indicated medications  You may have clear liquids until TWO hours before surgery/procedure.  This includes water, black tea/coffee, (no milk or cream) apple juice and electrolyte drinks (Gatorade)  You may chew gum up to TWO hours before your  surgery/procedure  Wear  comfortable loose fitting clothing  Do not use moisturizers, creams, lotions or perfume  All jewelry and valuables should be left at home  CONTACT SURGEON'S OFFICE IF YOU DEVELOP:  * Fever = 100.4 F   * New respiratory symptoms (e.g. cough, shortness of breath, respiratory distress, sore throat)  * Recent loss of taste or smell  *Flu like symptoms such as headache, fatigue or gastrointestinal symptoms  * You develop any open sores, shingles, burning or painful urination   AND/OR:  * You no longer wish to have the surgery.  * Any other personal circumstances change that may lead to the need to cancel or defer this surgery.  *You were admitted to any hospital within one week of your planned procedure.    SMOKING:  *Quitting smoking can make a huge difference to your health and recovery from surgery.    *If you need help with quitting, call 1-944-QUIT-NOW.    THE DAY BEFORE SURGERY:  *Do not eat any food after midnight the night before your surgery.   *You may have up to 13.5 OUNCES of clear liquids until TWO hours before your instructed ARRIVAL TIME to hospital. This includes water, black tea/coffee, (no milk or cream) apple juice, clear broth and electrolyte drinks (Gatorade). Please avoid clear liquids that are red in color.   *You may chew gum/mints up to TWO hours before your surgery/procedure.    SURGICAL TIME:  *You will be contacted between 2 p.m. and 3 p.m. the business day before your surgery with your arrival time.  *If you haven't received a call by 3pm, call [phone]  *Scheduled surgery times may change and you will be notified if this occurs-check your personal voicemail for any updates.    ON THE MORNING OF SURGERY:  *Wear comfortable, loose fitting clothing.   *Do not use moisturizers, creams, lotions or perfume.  *All jewelry and valuables should be left at home.  *Prosthetic devices such as contact lenses, hearing aids, dentures, eyelash extensions, hairpins and body  piercing must be removed before surgery.    BRING WITH YOU:  *Photo ID and insurance card  *Current list of medications and allergies  *Pacemaker/Defibrillator/Heart stent cards  *CPAP machine and mask  *Slings/splints/crutches  *Copy of your complete Advanced Directive/DHPOA-if applicable  *Neurostimulator implant remote    PARKING AND ARRIVAL:  *Check in at the Main Entrance desk and let them know you are here for surgery.    IF YOU ARE HAVING OUTPATIENT/SAME DAY SURGERY:  *A responsible adult MUST accompany you at the time of discharge and stay with you for 24 hours after your surgery.  *You may NOT drive yourself home after surgery.  *You may use a taxi or ride sharing service ([employer], Uber) to return home ONLY if you are accompanied by a friend or family member.  *Instructions for resuming your medications will be provided by your surgeon.    Thank you for coming to Pre Admission testing.     If I have prescribe medication please don't forget to  at your pharmacy.     Any questions about today's visit call [phone] and leave a message in the general mailbox.    Patient instructed to ambulate as soon as possible postoperatively to decrease thromboembolic risk.    Kayce Crystal, APRN-CNP    Thank you for visiting [facility].  If you have any changes to your health condition, please call the surgeons office to alert them and give them details of your symptoms.        Preoperative Fasting Guidelines    Why must I stop eating and drinking near surgery time?  With sedation, food or liquid in your stomach can enter your lungs causing serious complications  Increases nausea and vomiting    When do I need to stop eating and drinking before my surgery?  Do not eat any food after midnight the night before your surgery/procedure.  You may have up to 13.5 ounces of clear liquid until TWO hours before your instructed arrival time to the hospital.  This includes water, black tea/coffee, (no  milk or cream) apple juice, and electrolyte drinks (Gatorade)  You may chew gum until TWO hours before your surgery/procedure      Additional Instructions:     The Day before Surgery:  -Review your medication instructions, stop indicated medications  -You will be contacted in the evening regarding the time of your arrival to facility and surgery time    Day of Surgery:  -Review your medication instructions, take indicated medications  -Wear comfortable loose fitting clothing  -Do not use moisturizers, creams, lotions or perfume  -All jewelry and valuables should be left at home              Preoperative Brain Exercises    What are brain exercises?  A brain exercise is any activity that engages your thinking (cognitive) skills.    What types of activities are considered brain exercises?  Jigsaw puzzles, crossword puzzles, word jumble, memory games, word search, and many more.  Many can be found free online or on your phone via a mobile rylie.    Why should I do brain exercises before my surgery?  More recent research has shown brain exercise before surgery can lower the risk of postoperative delirium (confusion) which can be especially important for older adults.  Patients who did brain exercises for 5 to 10 hours the days before surgery, cut their risk of postoperative delirium in half up to 1 week after surgery.                  [facility]    Preoperative Deep Breathing Exercises    Why it is important to do deep breathing exercises before my surgery?  Deep breathing exercises strengthen your breathing muscles.  This helps you to recover after your surgery and decreases the chance of breathing complications.      How are the deep breathing exercises done?  Sit straight with your back supported.  Breathe in deeply and slowly through your nose. Your lower rib cage should expand and your abdomen may move forward.  Hold that breath for 3 to 5 seconds.  Breathe out through pursed lips, slowly  and completely.  Rest and repeat 10 times every hour while awake.  Rest longer if you become dizzy or lightheaded.                [facility]    Preoperative Deep Breathing Exercises    Why it is important to do deep breathing exercises before my surgery?  Deep breathing exercises strengthen your breathing muscles.  This helps you to recover after your surgery and decreases the chance of breathing complications.      How are the deep breathing exercises done?  Sit straight with your back supported.  Breathe in deeply and slowly through your nose. Your lower rib cage should expand and your abdomen may move forward.  Hold that breath for 3 to 5 seconds.  Breathe out through pursed lips, slowly and completely.  Rest and repeat 10 times every hour while awake.  Rest longer if you become dizzy or lightheaded.      Patient Information: Incentive Spirometer  What is an incentive spirometer?  An incentive spirometer is a device used before and after surgery to “exercise” your lungs.  It helps you to take deeper breaths to expand your lungs.  Below is an example of a basic incentive spirometer.  The device you receive may differ slightly but they all function the same.    Why do I need to use an incentive spirometer?  Using your incentive spirometer prepares your lungs for surgery and helps prevent lung problems after surgery.  How do I use my incentive spirometer?  When you're using your incentive spirometer, make sure to breathe through your mouth. If you breathe through your nose, the incentive spirometer won't work properly. You can hold your nose if you have trouble.  If you feel dizzy at any time, stop and rest. Try again at a later time.  Follow the steps below:  Set up your incentive spirometer, expand the flexible tubing and connect to the outlet.  Sit upright in a chair or bed. Hold the incentive spirometer at eye level.   Put the mouthpiece in your mouth and close your lips tightly around  it. Slowly breathe out (exhale) completely.  Breathe in (inhale) slowly through your mouth as deeply as you can. As you take a breath, you will see the piston rise inside the large column. While the piston rises, the indicator should move upwards. It should stay in between the 2 arrows (see Figure).  Try to get the piston as high as you can, while keeping the indicator between the arrows.   If the indicator doesn't stay between the arrows, you're breathing either too fast or too slow.  When you get it as high as you can, hold your breath for 10 seconds, or as long as possible. While you're holding your breath, the piston will slowly fall to the base of the spirometer.  Once the piston reaches the bottom of the spirometer, breathe out slowly through your mouth. Rest for a few seconds.  Repeat 10 times. Try to get the piston to the same level with each breath.  Repeat every hour while awake  You can carefully clean the outside of the mouthpiece with an alcohol wipe or soap and water.      Patient and Family Education             Ways You Can Help Prevent Blood Clots             This handout explains some simple things you can do to help prevent blood clots.      Blood clots are blockages that can form in the body's veins. When a blood clot forms in your deep veins, it may be called a deep vein thrombosis, or DVT for short. Blood clots can happen in any part of the body where blood flows, but they are most common in the arms and legs. If a piece of a blood clot breaks free and travels to the lungs, it is called a pulmonary embolus (PE). A PE can be a very serious problem.         Being in the hospital or having surgery can raise your chances of getting a blood clot because you may not be well enough to move around as much as you normally do.         Ways you can help prevent blood clots in the hospital         Wearing SCDs. SCDs stands for Sequential Compression Devices.   SCDs are special sleeves that wrap around your  legs  They attach to a pump that fills them with air to gently squeeze your legs every few minutes.   This helps return the blood in your legs to your heart.   SCDs should only be taken off when walking or bathing.   SCDs may not be comfortable, but they can help save your life.               Wearing compression stockings - if your doctor orders them. These special snug fitting stockings gently squeeze your legs to help blood flow.       Walking. Walking helps move the blood in your legs.   If your doctor says it is ok, try walking the halls at least   5 times a day. Ask us to help you get up, so you don't fall.      Taking any blood thinning medicines your doctor orders.        Page 1 of 2     [facility]; 3/23   Ways you can help prevent blood clots at home       Wearing compression stockings - if your doctor orders them. ? Walking - to help move the blood in your legs.       Taking any blood thinning medicines your doctor orders.      Signs of a blood clot or PE      Tell your doctor or nurse know right away if you have of the problems listed below.    If you are at home, seek medical care right away. Call 911 for chest pain or problems breathing.               Signs of a blood clot (DVT) - such as pain,  swelling, redness or warmth in your arm or leg      Signs of a pulmonary embolism (PE) - such as chest     pain or feeling short of breath

## 2024-09-23 ENCOUNTER — ANESTHESIA EVENT (OUTPATIENT)
Dept: OPERATING ROOM | Facility: HOSPITAL | Age: 69
End: 2024-09-23
Payer: MEDICARE

## 2024-09-23 NOTE — DISCHARGE INSTRUCTIONS
DEPARTMENT OF UROLOGY  DISCHARGE INSTRUCTIONS CYSTOSCOPY  Outpatient Surgery    [facility]   [facility]    [facility]      Call [phone] during regular daytime business hours (8:00 am - 5:00 pm) and after 5:00 pm ask for the Urology resident with any questions or concerns.      If it is a life-threatening situation, proceed to the nearest emergency department.        Follow-up appointment:    Future Appointments   Date Time Provider Department Center   10/4/2024  9:30 AM Valentin Vazquez MD [id number] [facility]   10/24/2024  8:00 AM Ibeth Christian PA-C [id number] [facility]   11/5/2024 11:30 AM MD JENNIFER [id number] [facility]   1/29/2025  8:40 AM Muna Leon MD [id number] [facility]   2/24/2025  8:15 AM Miguel Blas MD [id number] [facility]        Thank you for the opportunity to care for you today.  Your health and healing are very important to us.  We hope we made you feel as comfortable as possible and are committed to your recovery and continued well-being.      The following is a brief overview of your cystoscopy procedure today. Some of the information contained on this summary may be confidential.  This information should be kept in your records and should be shared with your regular doctor.    Physicians:   Dr. Davison      Procedure performed: cystoscopy (looked inside your bladder)  Pending results:  none     What to Expect During your Recovery and Home Care  Anesthesia Side Effects   You received anesthesia today.  You may feel sleepy, tired, or have a sore throat.   You may also feel drowsiness, dizziness, or inability to think clearly.  For your safety, do not drive, drink alcoholic beverages, take any unprescribed medication or make any important decisions for 24 hours.  A responsible adult should be with you for 24 hours.        Activity and Recovery    No heavy lifting today. Rest for the next 24 hours.       Pain Control  Unfortunately, you may experience  pain after your procedure.  Frequency and urgency to urinate and mild discomfort are expected. Adequate pain management can include alternative measures to help ease your pain and that can include over the counter Tylenol or ibuprofen which can be taken as prescribed as needed for breakthrough pain. Do not take more than 4,000mg of Tylenol in a 24-hour period.      Pyridium may be prescribed to help with burning/irritation. Pyridium may turn your urine orange.    Nausea/Vomiting   Clear liquids are best tolerated at first. Start slow, advance your diet as tolerated to normal foods. Avoid spicy, greasy, heavy foods at first. Also, you may feel nauseous or like you need to vomit if you take any type of medication on an empty stomach.  Call your physician if you are unable to eat or drink and have persistent vomiting.    Signs of Bleeding   You are going to have some blood in your urine. Your urine will be light pink to yellow. If urine becomes thick dark kendra red, has large clots or you are unable to urinate, please notify your physician.    Treatment/wound care:   Keep area(s) clean and dry.   It is okay to shower 24 hours after time of surgery.      Signs of Infection  Signs of infection can include fever, drainage(green/yellow), chills, burning sensation with passing of urine, or severe abdominal pain.  If you see any of these occur, please contact your doctor's office at [phone].  Any fever higher than 100.4, especially if associated with an ill feeling, abdominal pain, chills, or nausea should be reported to your surgeon.        Assist in bowel movements/urination  Increase fiber in diet  Increase water (6 to 8 glasses)  Increase walking   Urination should occur within 6 hours of anesthesia  If you have tried these methods and your bladder still feels full and you cannot use the bathroom, please go to your nearest Emergency room/contact your physician.    Additional Instructions:   Increase water intake for  the next 24 hours to help flush out our urinary system.    CATHETER CARE  Always keep the catheters tubing and drainage bag below the level of your bladder.  Avoid loops and kinks in the catheter tubing.  NOTIFY your physician if catheter falls out or catheter seems clogged and urine is not draining.   Do not wear the small leg bag to bed you should be provided with a larger overnight bag that you should wear to bed and can hang over the side of the bed.  We recommend wearing the large bag in the shower, as this is easy to dry, and you do not get your leg straps wet from your leg bag.   Your catheter should be secured to your upper thigh, do not allow it to hang or dangle.  You may remove your catheter after 24 hours.

## 2024-09-24 ENCOUNTER — HOSPITAL ENCOUNTER (OUTPATIENT)
Facility: HOSPITAL | Age: 69
Setting detail: OUTPATIENT SURGERY
Discharge: HOME | End: 2024-09-24
Attending: STUDENT IN AN ORGANIZED HEALTH CARE EDUCATION/TRAINING PROGRAM | Admitting: STUDENT IN AN ORGANIZED HEALTH CARE EDUCATION/TRAINING PROGRAM
Payer: MEDICARE

## 2024-09-24 ENCOUNTER — ANESTHESIA (OUTPATIENT)
Dept: OPERATING ROOM | Facility: HOSPITAL | Age: 69
End: 2024-09-24
Payer: MEDICARE

## 2024-09-24 VITALS
SYSTOLIC BLOOD PRESSURE: 128 MMHG | RESPIRATION RATE: 18 BRPM | DIASTOLIC BLOOD PRESSURE: 88 MMHG | HEART RATE: 70 BPM | TEMPERATURE: 97 F | OXYGEN SATURATION: 99 %

## 2024-09-24 DIAGNOSIS — N32.0 BLADDER NECK CONTRACTURE: Primary | ICD-10-CM

## 2024-09-24 PROCEDURE — 7100000002 HC RECOVERY ROOM TIME - EACH INCREMENTAL 1 MINUTE: Performed by: STUDENT IN AN ORGANIZED HEALTH CARE EDUCATION/TRAINING PROGRAM

## 2024-09-24 PROCEDURE — 52500 TRURL RESECTION BLADDER NECK: CPT | Performed by: STUDENT IN AN ORGANIZED HEALTH CARE EDUCATION/TRAINING PROGRAM

## 2024-09-24 PROCEDURE — 2720000007 HC OR 272 NO HCPCS: Performed by: STUDENT IN AN ORGANIZED HEALTH CARE EDUCATION/TRAINING PROGRAM

## 2024-09-24 PROCEDURE — 2500000004 HC RX 250 GENERAL PHARMACY W/ HCPCS (ALT 636 FOR OP/ED): Performed by: NURSE ANESTHETIST, CERTIFIED REGISTERED

## 2024-09-24 PROCEDURE — A52500 PR TRANSURETHRAL RESEC BLADDER NECK: Performed by: NURSE ANESTHETIST, CERTIFIED REGISTERED

## 2024-09-24 PROCEDURE — 3600000008 HC OR TIME - EACH INCREMENTAL 1 MINUTE - PROCEDURE LEVEL THREE: Performed by: STUDENT IN AN ORGANIZED HEALTH CARE EDUCATION/TRAINING PROGRAM

## 2024-09-24 PROCEDURE — 2500000004 HC RX 250 GENERAL PHARMACY W/ HCPCS (ALT 636 FOR OP/ED): Performed by: STUDENT IN AN ORGANIZED HEALTH CARE EDUCATION/TRAINING PROGRAM

## 2024-09-24 PROCEDURE — A52500 PR TRANSURETHRAL RESEC BLADDER NECK: Performed by: STUDENT IN AN ORGANIZED HEALTH CARE EDUCATION/TRAINING PROGRAM

## 2024-09-24 PROCEDURE — 3600000003 HC OR TIME - INITIAL BASE CHARGE - PROCEDURE LEVEL THREE: Performed by: STUDENT IN AN ORGANIZED HEALTH CARE EDUCATION/TRAINING PROGRAM

## 2024-09-24 PROCEDURE — 3700000002 HC GENERAL ANESTHESIA TIME - EACH INCREMENTAL 1 MINUTE: Performed by: STUDENT IN AN ORGANIZED HEALTH CARE EDUCATION/TRAINING PROGRAM

## 2024-09-24 PROCEDURE — 7100000001 HC RECOVERY ROOM TIME - INITIAL BASE CHARGE: Performed by: STUDENT IN AN ORGANIZED HEALTH CARE EDUCATION/TRAINING PROGRAM

## 2024-09-24 PROCEDURE — 7100000010 HC PHASE TWO TIME - EACH INCREMENTAL 1 MINUTE: Performed by: STUDENT IN AN ORGANIZED HEALTH CARE EDUCATION/TRAINING PROGRAM

## 2024-09-24 PROCEDURE — 3700000001 HC GENERAL ANESTHESIA TIME - INITIAL BASE CHARGE: Performed by: STUDENT IN AN ORGANIZED HEALTH CARE EDUCATION/TRAINING PROGRAM

## 2024-09-24 PROCEDURE — 7100000009 HC PHASE TWO TIME - INITIAL BASE CHARGE: Performed by: STUDENT IN AN ORGANIZED HEALTH CARE EDUCATION/TRAINING PROGRAM

## 2024-09-24 PROCEDURE — 2500000004 HC RX 250 GENERAL PHARMACY W/ HCPCS (ALT 636 FOR OP/ED): Mod: JZ | Performed by: STUDENT IN AN ORGANIZED HEALTH CARE EDUCATION/TRAINING PROGRAM

## 2024-09-24 PROCEDURE — 2500000005 HC RX 250 GENERAL PHARMACY W/O HCPCS: Performed by: NURSE ANESTHETIST, CERTIFIED REGISTERED

## 2024-09-24 RX ORDER — ONDANSETRON HYDROCHLORIDE 2 MG/ML
4 INJECTION, SOLUTION INTRAVENOUS ONCE AS NEEDED
Status: DISCONTINUED | OUTPATIENT
Start: 2024-09-24 | End: 2024-09-24 | Stop reason: HOSPADM

## 2024-09-24 RX ORDER — PHENAZOPYRIDINE HYDROCHLORIDE 200 MG/1
200 TABLET, FILM COATED ORAL 3 TIMES DAILY PRN
Qty: 9 TABLET | Refills: 0 | Status: SHIPPED | OUTPATIENT
Start: 2024-09-24 | End: 2024-09-27

## 2024-09-24 RX ORDER — FENTANYL CITRATE 50 UG/ML
50 INJECTION, SOLUTION INTRAMUSCULAR; INTRAVENOUS EVERY 5 MIN PRN
Status: DISCONTINUED | OUTPATIENT
Start: 2024-09-24 | End: 2024-09-24 | Stop reason: HOSPADM

## 2024-09-24 RX ORDER — LIDOCAINE HYDROCHLORIDE 10 MG/ML
INJECTION, SOLUTION EPIDURAL; INFILTRATION; INTRACAUDAL; PERINEURAL AS NEEDED
Status: DISCONTINUED | OUTPATIENT
Start: 2024-09-24 | End: 2024-09-24

## 2024-09-24 RX ORDER — CEFAZOLIN SODIUM 2 G/100ML
2 INJECTION, SOLUTION INTRAVENOUS ONCE
Status: COMPLETED | OUTPATIENT
Start: 2024-09-24 | End: 2024-09-24

## 2024-09-24 RX ORDER — MIDAZOLAM HYDROCHLORIDE 1 MG/ML
INJECTION, SOLUTION INTRAMUSCULAR; INTRAVENOUS AS NEEDED
Status: DISCONTINUED | OUTPATIENT
Start: 2024-09-24 | End: 2024-09-24

## 2024-09-24 RX ORDER — PROPOFOL 10 MG/ML
INJECTION, EMULSION INTRAVENOUS AS NEEDED
Status: DISCONTINUED | OUTPATIENT
Start: 2024-09-24 | End: 2024-09-24

## 2024-09-24 RX ORDER — SODIUM CHLORIDE, SODIUM LACTATE, POTASSIUM CHLORIDE, CALCIUM CHLORIDE 600; 310; 30; 20 MG/100ML; MG/100ML; MG/100ML; MG/100ML
100 INJECTION, SOLUTION INTRAVENOUS CONTINUOUS
Status: DISCONTINUED | OUTPATIENT
Start: 2024-09-24 | End: 2024-09-24 | Stop reason: HOSPADM

## 2024-09-24 RX ORDER — FENTANYL CITRATE 50 UG/ML
INJECTION, SOLUTION INTRAMUSCULAR; INTRAVENOUS AS NEEDED
Status: DISCONTINUED | OUTPATIENT
Start: 2024-09-24 | End: 2024-09-24

## 2024-09-24 RX ORDER — DIPHENHYDRAMINE HYDROCHLORIDE 50 MG/ML
12.5 INJECTION INTRAMUSCULAR; INTRAVENOUS ONCE AS NEEDED
Status: DISCONTINUED | OUTPATIENT
Start: 2024-09-24 | End: 2024-09-24 | Stop reason: HOSPADM

## 2024-09-24 RX ORDER — LABETALOL HYDROCHLORIDE 5 MG/ML
5 INJECTION, SOLUTION INTRAVENOUS ONCE AS NEEDED
Status: DISCONTINUED | OUTPATIENT
Start: 2024-09-24 | End: 2024-09-24 | Stop reason: HOSPADM

## 2024-09-24 RX ORDER — IPRATROPIUM BROMIDE 0.5 MG/2.5ML
500 SOLUTION RESPIRATORY (INHALATION) AS NEEDED
Status: DISCONTINUED | OUTPATIENT
Start: 2024-09-24 | End: 2024-09-24 | Stop reason: HOSPADM

## 2024-09-24 RX ORDER — ACETAMINOPHEN 325 MG/1
650 TABLET ORAL EVERY 6 HOURS PRN
Qty: 42 TABLET | Refills: 0 | Status: SHIPPED | OUTPATIENT
Start: 2024-09-24 | End: 2024-10-01

## 2024-09-24 RX ORDER — HYDRALAZINE HYDROCHLORIDE 20 MG/ML
5 INJECTION INTRAMUSCULAR; INTRAVENOUS EVERY 30 MIN PRN
Status: DISCONTINUED | OUTPATIENT
Start: 2024-09-24 | End: 2024-09-24 | Stop reason: HOSPADM

## 2024-09-24 RX ORDER — ONDANSETRON HYDROCHLORIDE 2 MG/ML
INJECTION, SOLUTION INTRAVENOUS AS NEEDED
Status: DISCONTINUED | OUTPATIENT
Start: 2024-09-24 | End: 2024-09-24

## 2024-09-24 RX ORDER — KETOROLAC TROMETHAMINE 30 MG/ML
INJECTION, SOLUTION INTRAMUSCULAR; INTRAVENOUS AS NEEDED
Status: DISCONTINUED | OUTPATIENT
Start: 2024-09-24 | End: 2024-09-24

## 2024-09-24 RX ORDER — PROCHLORPERAZINE EDISYLATE 5 MG/ML
5 INJECTION INTRAMUSCULAR; INTRAVENOUS ONCE AS NEEDED
Status: DISCONTINUED | OUTPATIENT
Start: 2024-09-24 | End: 2024-09-24 | Stop reason: HOSPADM

## 2024-09-24 RX ORDER — ALBUTEROL SULFATE 0.83 MG/ML
2.5 SOLUTION RESPIRATORY (INHALATION) ONCE AS NEEDED
Status: DISCONTINUED | OUTPATIENT
Start: 2024-09-24 | End: 2024-09-24 | Stop reason: HOSPADM

## 2024-09-24 RX ORDER — MEPERIDINE HYDROCHLORIDE 25 MG/ML
12.5 INJECTION INTRAMUSCULAR; INTRAVENOUS; SUBCUTANEOUS EVERY 10 MIN PRN
Status: DISCONTINUED | OUTPATIENT
Start: 2024-09-24 | End: 2024-09-24 | Stop reason: HOSPADM

## 2024-09-24 RX ORDER — LABETALOL HYDROCHLORIDE 5 MG/ML
INJECTION, SOLUTION INTRAVENOUS AS NEEDED
Status: DISCONTINUED | OUTPATIENT
Start: 2024-09-24 | End: 2024-09-24

## 2024-09-24 RX ORDER — FENTANYL CITRATE 50 UG/ML
25 INJECTION, SOLUTION INTRAMUSCULAR; INTRAVENOUS EVERY 5 MIN PRN
Status: DISCONTINUED | OUTPATIENT
Start: 2024-09-24 | End: 2024-09-24 | Stop reason: HOSPADM

## 2024-09-24 RX ORDER — IBUPROFEN 600 MG/1
600 TABLET ORAL EVERY 6 HOURS PRN
Qty: 20 TABLET | Refills: 0 | Status: SHIPPED | OUTPATIENT
Start: 2024-09-24 | End: 2024-09-29

## 2024-09-24 ASSESSMENT — PAIN SCALES - GENERAL
PAINLEVEL_OUTOF10: 0 - NO PAIN

## 2024-09-24 ASSESSMENT — COLUMBIA-SUICIDE SEVERITY RATING SCALE - C-SSRS
2. HAVE YOU ACTUALLY HAD ANY THOUGHTS OF KILLING YOURSELF?: NO
6. HAVE YOU EVER DONE ANYTHING, STARTED TO DO ANYTHING, OR PREPARED TO DO ANYTHING TO END YOUR LIFE?: NO
1. IN THE PAST MONTH, HAVE YOU WISHED YOU WERE DEAD OR WISHED YOU COULD GO TO SLEEP AND NOT WAKE UP?: NO

## 2024-09-24 ASSESSMENT — PAIN - FUNCTIONAL ASSESSMENT
PAIN_FUNCTIONAL_ASSESSMENT: 0-10

## 2024-09-24 NOTE — ANESTHESIA PREPROCEDURE EVALUATION
Patient: Mohit Villatoro    Procedure Information       Date/Time: 09/24/24 0700    Procedure: Incision Transurethral Bladder Neck ( bipolar resectoscope    Location: [facility] / [facility]    Surgeons: Rocco Davison MD            Relevant Problems   Anesthesia (within normal limits)      Cardiac   (-) History of coronary artery bypass graft   (-) Pacemaker      Pulmonary   (+) Obstructive sleep apnea syndrome   (-) Asthma   (-) Chronic obstructive pulmonary disease (Multi)      Neuro   (+) Adult situational stress disorder   (+) Herpes zoster dermatitis   (-) CVA (cerebral vascular accident) (Multi)   (-) Seizures (Multi)      /Renal   (+) BPH (benign prostatic hyperplasia)   (+) BPH with obstruction/lower urinary tract symptoms      Endocrine   (-) Diabetes mellitus, type 2 (Multi)      Hematology   (-) Coagulopathy (Multi)      Musculoskeletal   (+) Primary localized osteoarthritis of left knee      ID   (+) Herpes zoster dermatitis   (+) Shingles       Clinical information reviewed:   Tobacco  Allergies  Meds   Med Hx  Surg Hx   Fam Hx  Soc Hx        NPO Detail:  NPO/Void Status  Date of Last Liquid: 09/23/24  Time of Last Liquid: 2030  Date of Last Solid: 09/23/24  Time of Last Solid: 2030  Time of Last Void: 0530         Physical Exam    Airway  Mallampati: I  TM distance: >3 FB  Neck ROM: full  Comments: Small chin   Cardiovascular    Dental    Pulmonary    Abdominal            Anesthesia Plan    History of general anesthesia?: yes  History of complications of general anesthesia?: no    ASA 1     MAC and general     intravenous induction   Postoperative administration of opioids is intended.  Anesthetic plan and risks discussed with patient.       [FreeTextEntry1] : Restart Flonase 2 sp ea nostril qd  OTC Claritin daily   Start OTC Debrox for  EAC cerumen

## 2024-09-24 NOTE — POST-PROCEDURE NOTE
Pt arrived to PACU bay 7.  Arouses to verbal stimuli.  VS stable. Denies any c/o pain or nausea. Warm blanket applied.     20 Fr Marcus catheter secured with STAT lock on left leg, draining clear urine.

## 2024-09-24 NOTE — ANESTHESIA POSTPROCEDURE EVALUATION
Patient: Mohit Villatoro    Procedure Summary       Date: 09/24/24 Room / Location: [facility] / [facility]    Anesthesia Start: 0723 Anesthesia Stop: 0823    Procedure: Incision Transurethral Bladder Neck ( bipolar resectoscope Diagnosis:       Bladder neck contracture      (Bladder neck contracture [N32.0])    Surgeons: Rocco Davison MD Responsible Provider: Maynor Remy MD    Anesthesia Type: MAC ASA Status: 1            Anesthesia Type: MAC    Vitals Value Taken Time   /84 09/24/24 0830   Temp 36 °C (96.8 °F) 09/24/24 0830   Pulse 71 09/24/24 0830   Resp 17 09/24/24 0830   SpO2 94 % 09/24/24 0830       Anesthesia Post Evaluation    Patient location during evaluation: PACU  Patient participation: complete - patient participated  Level of consciousness: awake  Pain management: adequate  Multimodal analgesia pain management approach  Airway patency: patent  Cardiovascular status: acceptable and hemodynamically stable  Respiratory status: acceptable and spontaneous ventilation  Hydration status: euvolemic  Postoperative Nausea and Vomiting: none  Comments: No nausea or vomiting        There were no known notable events for this encounter.

## 2024-09-24 NOTE — OP NOTE
Incision Transurethral Bladder Neck ( bipolar resectoscope Operative Note     Date: 2024  OR Location: [facility]    Name: Mohit Villatoro, : 1955, Age: 68 y.o., MRN: [id number], Sex: male    Diagnosis  Pre-op Diagnosis      * Bladder neck contracture [N32.0] Post-op Diagnosis     * Bladder neck contracture [N32.0]     Procedures  Incision Transurethral Bladder Neck ( bipolar resectoscope  63917 - OR TRANSURETHRAL RESECTION BLADDER NECK      Surgeons      * Rocco Davison - Primary    Resident/Fellow/Other Assistant:  Surgeons and Role:  Rex Owens MD    Procedure Summary  Anesthesia: Monitor Anesthesia Care  ASA: I  Anesthesia Staff: Anesthesiologist: Maynor Remy MD  CRNA: JN Benz-CRNA  Estimated Blood Loss: 3mL  Intra-op Medications:   Administrations occurring from 0700 to 0800 on 24:   Medication Name Total Dose   lactated Ringer's infusion Cannot be calculated   ceFAZolin (Ancef) 2 g in dextrose (iso)  mL 2 g              Anesthesia Record               Intraprocedure I/O Totals          Intake    lactated Ringer's infusion 800.00 mL    Total Intake 800 mL          Specimen: No specimens collected     Staff:   Circulator: Blanca  Scrub Person: Judy Corbettub Person: Jyotsna         Drains and/or Catheters:   Urethral Catheter 20 Fr. (Active)   Site Assessment Clean;Skin intact 24 08   Collection Container Standard drainage bag 24 08   Securement Method Securing device (Describe) 24 08   Reason for Continuing Urinary Catheterization surgical procedures: urological/gynecological, pelvic oncology, anal, prolonged surgical procedure 24 0823       Tourniquet Times:         Implants:     Findings: Bladder neck contracture incised with bipolar needle at 3 and 9 o'clock to bleeding mucosa    Indications: Mohit Villatoro is an 68 y.o. male who is having surgery for Bladder neck contracture [N32.0]. Hx of HoLEP and previous  BNI.    The patient was seen in the preoperative area. The risks, benefits, complications, treatment options, non-operative alternatives, expected recovery and outcomes were discussed with the patient. The possibilities of reaction to medication, pulmonary aspiration, injury to surrounding structures, bleeding, recurrent infection, the need for additional procedures, failure to diagnose a condition, and creating a complication requiring transfusion or operation were discussed with the patient. The patient concurred with the proposed plan, giving informed consent.  The site of surgery was properly noted/marked if necessary per policy. The patient has been actively warmed in preoperative area. Preoperative antibiotics have been ordered and given within 1 hours of incision. Venous thrombosis prophylaxis have been ordered including bilateral sequential compression devices    Procedure Details:   Patient consented to procedure in preoperative area. Risks and benefits discussed. Allergies were reviewed and preoperative antibiotics were administered. Patient was brought to the operating room and placed in supine position on the operating room table. A timeout was performed. All were in agreement. Patient underwent general anesthesia without complication. They were repositioned in dorsal lithotomy and prepped and draped in the usual sterile fashion.     A 26F resectoscope was used to perform cystourethroscopy. The anterior urethra was normal. The prostate was visualized with a bladder neck contracture preventing passage of resectoscope into bladder. The bipolar needle was used to incise the bladder neck at 3 and 9 o'clock. We were then able to perform cystoscopy. UOs were in normal orthotopic position. No  stones were identified. No obvious masses noted. Trabeculations noted. The bladder neck was continued laterally until healthy bleeding tissue was encountered. Coag used for hemostasis. Bladder was emptied and re-examined.  Any remaining bleeding was cauterized. All instruments were removed. A 20 Fr Marcus catheter was placed and bladder was drained. Patient was awoken from anesthesia and transferred to PACU in stable condition.     Complications:  None; patient tolerated the procedure well.    Disposition: PACU - hemodynamically stable.  Condition: stable         Additional Details: Marcus to come out in 24 hours - removal by patient    Attending Attestation:   I was present during all critical and key portions of the procedure(s) and immediately available to furnish services the entire duration.  See resident note for details.         Rocco Davison  Phone Number: [phone]

## 2024-09-24 NOTE — NURSING NOTE
Patient in Phase 2; dressed and up to chair with RN assist. Tolerating po fluids, no complaint of pain and no complaint of nausea.     Delay - family not at facility (0900); arrived 0920    Family at bedside; discussed discharge instructions with patient and Family. All questions at this time answered.     Patient clinically appropriate for discharge. IV removed and patient transported to discharge area via wheelchair.

## 2024-09-24 NOTE — ANESTHESIA PROCEDURE NOTES
Airway  Date/Time: 9/24/2024 7:31 AM  Urgency: elective    Airway not difficult    Staffing  Performed: CRNA   Authorized by: Maynor Remy MD    Performed by: JN Benz-ANSELMO  Patient location during procedure: OR    Indications and Patient Condition  Indications for airway management: anesthesia  Spontaneous ventilation: present  Sedation level: deep  Preoxygenated: yes  Patient position: sniffing  MILS maintained throughout  Mask difficulty assessment: 1 - vent by mask  Planned trial extubation    Final Airway Details  Final airway type: supraglottic airway      Successful airway: classic  Size 5     Number of attempts at approach: 1

## 2024-09-30 DIAGNOSIS — F43.29 STRESS AND ADJUSTMENT REACTION: ICD-10-CM

## 2024-09-30 DIAGNOSIS — G47.9 SLEEP DISTURBANCE: ICD-10-CM

## 2024-09-30 RX ORDER — TRAZODONE HYDROCHLORIDE 100 MG/1
100 TABLET ORAL NIGHTLY
Qty: 90 TABLET | Refills: 0 | Status: SHIPPED | OUTPATIENT
Start: 2024-09-30 | End: 2024-10-04 | Stop reason: SDUPTHER

## 2024-09-30 RX ORDER — BUPROPION HYDROCHLORIDE 100 MG/1
100 TABLET, EXTENDED RELEASE ORAL 2 TIMES DAILY
Qty: 180 TABLET | Refills: 0 | Status: SHIPPED | OUTPATIENT
Start: 2024-09-30 | End: 2024-10-04 | Stop reason: SDUPTHER

## 2024-10-01 ENCOUNTER — APPOINTMENT (OUTPATIENT)
Dept: PRIMARY CARE | Facility: CLINIC | Age: 69
End: 2024-10-01
Payer: MEDICARE

## 2024-10-01 ASSESSMENT — ENCOUNTER SYMPTOMS
TROUBLE SWALLOWING: 0
DIARRHEA: 0
SWOLLEN GLANDS: 0
COUGH: 1
HOARSE VOICE: 0
NECK PAIN: 0
ABDOMINAL PAIN: 0
STRIDOR: 0
HEADACHES: 0
SHORTNESS OF BREATH: 0
VOMITING: 0
SORE THROAT: 1

## 2024-10-03 ENCOUNTER — LAB (OUTPATIENT)
Dept: LAB | Facility: LAB | Age: 69
End: 2024-10-03
Payer: MEDICARE

## 2024-10-03 DIAGNOSIS — R79.9 ABNORMAL BLOOD CHEMISTRY: ICD-10-CM

## 2024-10-03 DIAGNOSIS — R79.9 ABNORMAL BLOOD CHEMISTRY: Primary | ICD-10-CM

## 2024-10-03 PROCEDURE — 83036 HEMOGLOBIN GLYCOSYLATED A1C: CPT

## 2024-10-03 PROCEDURE — 36415 COLL VENOUS BLD VENIPUNCTURE: CPT

## 2024-10-04 ENCOUNTER — APPOINTMENT (OUTPATIENT)
Dept: PRIMARY CARE | Facility: CLINIC | Age: 69
End: 2024-10-04
Payer: MEDICARE

## 2024-10-04 VITALS
RESPIRATION RATE: 16 BRPM | BODY MASS INDEX: 27.48 KG/M2 | WEIGHT: 197 LBS | OXYGEN SATURATION: 97 % | HEART RATE: 74 BPM | DIASTOLIC BLOOD PRESSURE: 70 MMHG | SYSTOLIC BLOOD PRESSURE: 117 MMHG

## 2024-10-04 DIAGNOSIS — E11.9 TYPE 2 DIABETES MELLITUS WITHOUT COMPLICATION, UNSPECIFIED WHETHER LONG TERM INSULIN USE (MULTI): ICD-10-CM

## 2024-10-04 DIAGNOSIS — Z23 FLU VACCINE NEED: ICD-10-CM

## 2024-10-04 DIAGNOSIS — G47.9 SLEEP DISTURBANCE: ICD-10-CM

## 2024-10-04 DIAGNOSIS — G35 MULTIPLE SCLEROSIS (MULTI): Primary | ICD-10-CM

## 2024-10-04 DIAGNOSIS — F43.29 STRESS AND ADJUSTMENT REACTION: ICD-10-CM

## 2024-10-04 LAB
EST. AVERAGE GLUCOSE BLD GHB EST-MCNC: 103 MG/DL
HBA1C MFR BLD: 5.2 %

## 2024-10-04 PROCEDURE — 1159F MED LIST DOCD IN RCRD: CPT | Performed by: INTERNAL MEDICINE

## 2024-10-04 PROCEDURE — 3078F DIAST BP <80 MM HG: CPT | Performed by: INTERNAL MEDICINE

## 2024-10-04 PROCEDURE — 99214 OFFICE O/P EST MOD 30 MIN: CPT | Performed by: INTERNAL MEDICINE

## 2024-10-04 PROCEDURE — 1036F TOBACCO NON-USER: CPT | Performed by: INTERNAL MEDICINE

## 2024-10-04 PROCEDURE — G0008 ADMIN INFLUENZA VIRUS VAC: HCPCS | Performed by: INTERNAL MEDICINE

## 2024-10-04 PROCEDURE — 1157F ADVNC CARE PLAN IN RCRD: CPT | Performed by: INTERNAL MEDICINE

## 2024-10-04 PROCEDURE — 1124F ACP DISCUSS-NO DSCNMKR DOCD: CPT | Performed by: INTERNAL MEDICINE

## 2024-10-04 PROCEDURE — 90662 IIV NO PRSV INCREASED AG IM: CPT | Performed by: INTERNAL MEDICINE

## 2024-10-04 PROCEDURE — 3074F SYST BP LT 130 MM HG: CPT | Performed by: INTERNAL MEDICINE

## 2024-10-04 PROCEDURE — 3044F HG A1C LEVEL LT 7.0%: CPT | Performed by: INTERNAL MEDICINE

## 2024-10-04 RX ORDER — TRAZODONE HYDROCHLORIDE 100 MG/1
100 TABLET ORAL NIGHTLY
Qty: 90 TABLET | Refills: 1 | Status: SHIPPED | OUTPATIENT
Start: 2024-12-10

## 2024-10-04 RX ORDER — TRAZODONE HYDROCHLORIDE 100 MG/1
100 TABLET ORAL NIGHTLY
Qty: 90 TABLET | Refills: 1 | Status: SHIPPED | OUTPATIENT
Start: 2024-12-10 | End: 2024-10-04 | Stop reason: SDUPTHER

## 2024-10-04 RX ORDER — BUPROPION HYDROCHLORIDE 100 MG/1
100 TABLET, EXTENDED RELEASE ORAL 2 TIMES DAILY
Qty: 180 TABLET | Refills: 1 | Status: SHIPPED | OUTPATIENT
Start: 2024-12-10

## 2024-10-04 RX ORDER — BUPROPION HYDROCHLORIDE 100 MG/1
100 TABLET, EXTENDED RELEASE ORAL 2 TIMES DAILY
Qty: 180 TABLET | Refills: 1 | Status: SHIPPED | OUTPATIENT
Start: 2024-12-10 | End: 2024-10-04 | Stop reason: SDUPTHER

## 2024-10-04 ASSESSMENT — ENCOUNTER SYMPTOMS
OCCASIONAL FEELINGS OF UNSTEADINESS: 0
DIARRHEA: 0
SORE THROAT: 1
HOARSE VOICE: 0
SHORTNESS OF BREATH: 0
DEPRESSION: 0
TROUBLE SWALLOWING: 0
VOMITING: 0
NECK PAIN: 0
STRIDOR: 0
LOSS OF SENSATION IN FEET: 0
COUGH: 1
SWOLLEN GLANDS: 0
ABDOMINAL PAIN: 0
HEADACHES: 0

## 2024-10-04 ASSESSMENT — PATIENT HEALTH QUESTIONNAIRE - PHQ9
2. FEELING DOWN, DEPRESSED OR HOPELESS: NOT AT ALL
SUM OF ALL RESPONSES TO PHQ9 QUESTIONS 1 AND 2: 0
1. LITTLE INTEREST OR PLEASURE IN DOING THINGS: NOT AT ALL

## 2024-10-04 NOTE — PROGRESS NOTES
Subjective   Patient ID: Mohit Villatoro is a 68 y.o. male who presents for FUV and Cough (Dry cough for 2 months.  Pt thinks it's allergies.).    Cough  Associated symptoms include a sore throat. Pertinent negatives include no ear pain, headaches or shortness of breath.   Sore Throat   This is a recurrent problem. The current episode started more than 1 month ago. The problem has been gradually improving. Neither side of throat is experiencing more pain than the other. There has been no fever. The fever has been present for Less than 1 day. The pain is at a severity of 2/10. The pain is mild. Associated symptoms include coughing. Pertinent negatives include no abdominal pain, congestion, diarrhea, drooling, ear discharge, ear pain, headaches, hoarse voice, plugged ear sensation, neck pain, shortness of breath, stridor, swollen glands, trouble swallowing or vomiting.     Patient in for a visit  Had another urological procedure , Dr Leon  Will see DR Davison in follow-up, there is   The bupropion is effective , and takes the trazodone to help   Chronic cough ?dust mite no fevers dry   Review of Systems   HENT:  Positive for sore throat. Negative for congestion, drooling, ear discharge, ear pain, hoarse voice and trouble swallowing.    Respiratory:  Positive for cough. Negative for shortness of breath and stridor.    Gastrointestinal:  Negative for abdominal pain, diarrhea and vomiting.   Musculoskeletal:  Negative for neck pain.   Neurological:  Negative for headaches.     General: Denies fever, chills, night sweats,  Eyes: Negative for recent visual changes  Ears, Nose, Throat :  Negative for hearing changes, sinus discomfort  Dermatologic: Negative for new skin conditions, rash  Respiratory: Negative for wheezing, shortness of breath, cough  Cardiovascular: Negative for chest pain, palpitations, or leg swelling  Gastrointestinal: Negative for nausea/vomiting, abdominal pain, changes in bowel habits  taking otc  pepcid prn only not daily   Genitourinary Negative for Urinary Incontinence  urgency , frequency, discomfort   Musculoskeletal: see hpi  Neurological: Negative for headaches, dizziness    Previous history  Past Medical History:   Diagnosis Date    Abnormal finding of blood chemistry, unspecified 2022    Abnormal blood chemistry    Adjustment disorder, unspecified 2021    Adult situational stress disorder    Allergic rhinitis, unspecified 2017    Allergic rhinitis    Benign prostatic hyperplasia without lower urinary tract symptoms 2022    BPH (benign prostatic hyperplasia)    Cataract     Encounter for general adult medical examination without abnormal findings 2022    Welcome to Medicare preventive visit    Encounter for screening for malignant neoplasm of prostate 10/12/2022    Screening for prostate cancer    Nocturia 2017    Nocturia    Obstructive sleep apnea syndrome 10/04/2023    Other abnormality of red blood cells 10/12/2022    Elevated MCV    Pain in right knee 2023    Bilateral knee pain    Poor urinary stream 2022    Weak urine stream    Pure hypercholesterolemia, unspecified 10/12/2022    Elevated LDL cholesterol level    Sleep disorder, unspecified 2021    Sleep disturbance    Unspecified abnormal findings in urine 09/10/2020    Urine abnormality    Unspecified skin changes 2021    Skin change     Past Surgical History:   Procedure Laterality Date    LASER OF PROSTATE W/ GREEN LIGHT PVP      Holmium laser of prostate(not green light)    OTHER SURGICAL HISTORY  2018    Hernia repair     Social History     Tobacco Use    Smoking status: Former     Current packs/day: 0.00     Average packs/day: 0.4 packs/day for 30.0 years (12.5 ttl pk-yrs)     Types: Cigarettes     Start date: 1977     Quit date: 1987     Years since quittin.1    Smokeless tobacco: Never    Tobacco comments:     Casual smoker   Vaping Use    Vaping status:  Never Used   Substance Use Topics    Alcohol use: Yes     Alcohol/week: 4.0 standard drinks of alcohol     Types: 4 Glasses of wine per week     Comment: occasionally 1-4    Drug use: Not Currently     Family History   Problem Relation Name Age of Onset    Hypertension Mother      Diabetes Sister      Hashimoto's thyroiditis Sister      Other (cardiac disorder) Other      Other (allergy) Other      Hypertension Other       Allergies   Allergen Reactions    Fluoxetine Hcl Unknown     sex dysfunction    House Dust Mite Cough     Current Outpatient Medications   Medication Instructions    buPROPion SR (WELLBUTRIN SR) 100 mg, oral, 2 times daily, Do not crush, chew, or split.    tadalafil (CIALIS) 20 mg, oral, Daily PRN    traZODone (DESYREL) 100 mg, oral, Nightly       Objective       Physical Exam    Vital Signs: as recorded above  General: Well groomed, well nourished   Orientation:  Alert , oriented to time, place , and person   Mood and Affect:  Cooperative , no apparent distress normal affect  Skin: Good color, good turgor  Eyes: Extra ocular muscle movements intact, anicteric sclerae  Neck: Supple, full range of movement  Chest: Normal breath sounds, normal chest wall exam, symmetric, good air entry, clear to auscultation  Heart: Regular rate and rhythm, without murmur, gallop, or rubs  Abdomen soft nontender no masses felt no hepatosplenomegaly, no rebound or guarding  BACK:  no CTLS spine tenderness, no flank tenderness  Extremities: full range of movement  bilateral UE and bilateral LE,  no lower extremity edema  Neurological: Alert, oriented, cranial nerves II-XII grossly intact except for visual acuity  Sensation:  Intact   Gait: normal steady    Assessment/Plan   Mohit Villatoro is a 68 y.o. male who presents for the concerns below:    Problem List Items Addressed This Visit    None  Persistent cough afebrile, inc his precautions against dust mites , try allegra   May also be reflux, taking pepcid      STRESS MANAGEMENT:PLAN:  Patient symptoms stable with current medication, will update refills.  No adverse side effects. Continue exercise swimming walking biking    UROLOGY s/p procedure with some improvement in flow seeing Dr Davison in follow-up              Discussed with:   Return in : early Dec for awv     ADDENDUM:  INCORRECT DIAGNOSIS PLACED FOR THIS VISIT .    PATIENT DOES NOT HAVE MULTIPLE SCLEROSIS OR DIABETES. WILL HAVE A LETTER DRAWN UP REGARDING THIS MISTAKE .    Aq 2/24/25       Portions of this note were generated using digital voice recognition software, and may contain grammatical errors       Valentin Vazquez MD  10/04/24  10:14 AM

## 2024-10-24 ENCOUNTER — HOSPITAL ENCOUNTER (OUTPATIENT)
Dept: RADIOLOGY | Facility: CLINIC | Age: 69
Discharge: HOME | End: 2024-10-24
Payer: MEDICARE

## 2024-10-24 ENCOUNTER — OFFICE VISIT (OUTPATIENT)
Dept: ORTHOPEDIC SURGERY | Facility: CLINIC | Age: 69
End: 2024-10-24
Payer: MEDICARE

## 2024-10-24 DIAGNOSIS — Z47.1 AFTERCARE FOLLOWING LEFT KNEE JOINT REPLACEMENT SURGERY: ICD-10-CM

## 2024-10-24 DIAGNOSIS — Z96.652 AFTERCARE FOLLOWING LEFT KNEE JOINT REPLACEMENT SURGERY: ICD-10-CM

## 2024-10-24 PROCEDURE — 1159F MED LIST DOCD IN RCRD: CPT | Performed by: PHYSICIAN ASSISTANT

## 2024-10-24 PROCEDURE — 73562 X-RAY EXAM OF KNEE 3: CPT | Mod: LT

## 2024-10-24 PROCEDURE — 73562 X-RAY EXAM OF KNEE 3: CPT | Mod: LEFT SIDE | Performed by: RADIOLOGY

## 2024-10-24 PROCEDURE — 99214 OFFICE O/P EST MOD 30 MIN: CPT | Performed by: PHYSICIAN ASSISTANT

## 2024-10-24 PROCEDURE — 1157F ADVNC CARE PLAN IN RCRD: CPT | Performed by: PHYSICIAN ASSISTANT

## 2024-10-24 PROCEDURE — 3044F HG A1C LEVEL LT 7.0%: CPT | Performed by: PHYSICIAN ASSISTANT

## 2024-10-24 ASSESSMENT — PAIN - FUNCTIONAL ASSESSMENT: PAIN_FUNCTIONAL_ASSESSMENT: NO/DENIES PAIN

## 2024-10-24 NOTE — PROGRESS NOTES
Ibeth Christian, BARBARA, PARheaC, ATC  Adult Re[facility] Surgery  Phone: [phone]     Fax:[phone]            Chief Complaint   Patient presents with    Left Knee - Follow-up     1 Year F/U       HPI:  Mohit Villatoro is a pleasant 68 y.o. year-old male here for follow-up of their side: left total knee arthroplasty by Dr. Abdul.  The patient is approximately 1 year(s) postop.The patient has no mechanical symptoms.  The patient has no swelling and pain.   The patients wound has healed uneventfully.  The patient has been doing HEP and/or outpatient PT.  No complications postoperatively.  The patient is very happy with the result of the operation.  He is very happy with the results of the operation.  He is biking a lot.  No pain.    Review of Systems  Past Medical History:   Diagnosis Date    Abnormal finding of blood chemistry, unspecified 11/30/2022    Abnormal blood chemistry    Adjustment disorder, unspecified 12/17/2021    Adult situational stress disorder    Allergic rhinitis, unspecified 09/01/2017    Allergic rhinitis    Benign prostatic hyperplasia without lower urinary tract symptoms 12/16/2022    BPH (benign prostatic hyperplasia)    Cataract     Encounter for general adult medical examination without abnormal findings 11/30/2022    Welcome to Medicare preventive visit    Encounter for screening for malignant neoplasm of prostate 10/12/2022    Screening for prostate cancer    Nocturia 09/01/2017    Nocturia    Obstructive sleep apnea syndrome 10/04/2023    Other abnormality of red blood cells 10/12/2022    Elevated MCV    Pain in right knee 01/05/2023    Bilateral knee pain    Poor urinary stream 12/16/2022    Weak urine stream    Pure hypercholesterolemia, unspecified 10/12/2022    Elevated LDL cholesterol level    Sleep disorder, unspecified 04/09/2021    Sleep disturbance    Type 2 diabetes mellitus without complication, unspecified whether long term insulin  use (Multi) 10/4/2024    Unspecified abnormal findings in urine 09/10/2020    Urine abnormality    Unspecified skin changes 04/26/2021    Skin change     Patient Active Problem List   Diagnosis    Acquired short Achilles tendon of left lower extremity    Adult situational stress disorder    Age-related nuclear cataract of left eye    Age-related nuclear cataract of right eye    Benign neoplasm of colon    BPH (benign prostatic hyperplasia)    Blood pressure elevated without history of HTN    Closed subchondral insufficiency fracture of condyle of left femur    Herpes zoster dermatitis    Shingles    Elevated LDL cholesterol level    Elevated MCV    Hyperopia of both eyes    Hypersomnia with sleep apnea    Medial meniscus tear    Nocturia    Obstructive sleep apnea syndrome    Skin change    Sleep disturbance    Blurred vision    Urine abnormality    Abnormal blood chemistry    BPH with obstruction/lower urinary tract symptoms    Cavus deformity of left foot    Femoroacetabular impingement of both hips    Hyperopia with presbyopia of both eyes    Low testosterone    Male erectile disorder    Overweight with body mass index (BMI) of 28 to 28.9 in adult    Primary localized osteoarthritis of left knee    Vitamin D deficiency    Tendinopathy of left gluteus medius    Tendinopathy of right gluteus medius    Bladder neck contracture    Left knee pain    Multiple sclerosis (Multi)    Type 2 diabetes mellitus without complication, unspecified whether long term insulin use (Multi)     Medication Documentation Review Audit       Reviewed by Tiffany Castellano LPN (Licensed Nurse) on 10/24/24 at 0807      Medication Order Taking? Sig Documenting Provider Last Dose Status   buPROPion SR (Wellbutrin SR) 100 mg 12 hr tablet [id number] Yes Take 1 tablet (100 mg) by mouth 2 times a day. Do not crush, chew, or split. Do not fill before December 10, 2024. Valentin Vazquez MD  Active   tadalafil (Cialis) 20 mg tablet [id number]  Yes Take 1 tablet (20 mg) by mouth once daily as needed for erectile dysfunction. Muna Leon MD Taking Active   traZODone (Desyrel) 100 mg tablet [id number] Yes Take 1 tablet (100 mg) by mouth once daily at bedtime. Do not fill before December 10, 2024. Valentin Vazquez MD  Active                  Allergies   Allergen Reactions    Fluoxetine Hcl Unknown     sex dysfunction    House Dust Mite Cough     Social History     Socioeconomic History    Marital status:      Spouse name: Not on file    Number of children: Not on file    Years of education: Not on file    Highest education level: Not on file   Occupational History     Employer: [employer]   Tobacco Use    Smoking status: Former     Current packs/day: 0.00     Average packs/day: 0.4 packs/day for 30.0 years (12.5 ttl pk-yrs)     Types: Cigarettes     Start date: 1977     Quit date: 1987     Years since quittin.1    Smokeless tobacco: Never    Tobacco comments:     Casual smoker   Vaping Use    Vaping status: Never Used   Substance and Sexual Activity    Alcohol use: Yes     Alcohol/week: 4.0 standard drinks of alcohol     Types: 4 Glasses of wine per week     Comment: occasionally 1-4    Drug use: Not Currently    Sexual activity: Not Currently     Partners: Female     Birth control/protection: Abstinence   Other Topics Concern    Not on file   Social History Narrative    Not on file     Social Drivers of Health     Financial Resource Strain: Not on file   Food Insecurity: Not on file   Transportation Needs: No Transportation Needs (2023)    OASIS : Transportation     Lack of Transportation (Medical): No     Lack of Transportation (Non-Medical): No     Patient Unable or Declines to Respond: No   Physical Activity: Not on file   Stress: Not on file   Social Connections: Feeling Somewhat Isolated (2023)    OASIS : Social Isolation     Frequency of experiencing loneliness or isolation: Sometimes    Intimate Partner Violence: Not on file   Housing Stability: Not on file     Past Surgical History:   Procedure Laterality Date    LASER OF PROSTATE W/ GREEN LIGHT PVP      Holmium laser of prostate(not green light)    OTHER SURGICAL HISTORY  11/27/2018    Hernia repair       Physical Exam  side: left Knee  There were no vitals filed for this visit.  AxO x 3 in NAD.   Assistive Device: no device. Coordination and balance intact.  Normal bilateral upper and lower extremities.  No erythema, ecchymosis, temperature changes. No popliteal lymphadenopathy,  No overlying lesion  Mood: euthymic  Respirations non-labored  The incision is midline healed with no signs of surrounding infection, dehiscence or drainage.   Neurovascular exam is at baseline.  No instability varus or valgus stressing the knee at 0, 30 or 60 degrees.  No instability in the AP plane at 90 degrees.  Range of motion: 0 degrees extension, 120 degrees flexion  5/5 hip flexion/knee extension/DF/PF/EHL  SILT in darline/saph/ per/tib distribution   Extremities warm and well perfused.  No lower extremity calf tenderness, warmth or swelling. Lower extremity well perfused  2+ Femoral/DP/PT pulses bilaterally    Imaging:    I personally reviewed multiple views of the knee today in clinic. Status post side: left Total Knee Arthroplasty. The implant is well fixed, well aligned.  No evidence of janna-implant fracture, lucency or dislocation.    Impression/Plan:  Mohit Villatoro is doing well post-operatively and happy with the results of the operation.     S/P side: left Total Knee Arthroplasty  I talked with patient at length about activity precautions and progression of activities. The patient understands their permanent precautions.   3. Discussed the importance of dental prophylactic dental antibiotics lifelong. Patient may request medication refill through MyChart,       Pharmacy or surgeons office.    All questions answered.    Follow-up 5 years with x-rays at  next visit.    BARBARA Nolen, PA-C, ATC  Orthopedic Physician Assisant  Adult Reconstruction and Total Joint Replacement  General Orthopedics  Department of Orthopaedic Surgery  [place] [id number]  Loopcam messaging preferred

## 2024-11-05 ENCOUNTER — APPOINTMENT (OUTPATIENT)
Dept: UROLOGY | Facility: CLINIC | Age: 69
End: 2024-11-05
Payer: MEDICARE

## 2024-11-05 DIAGNOSIS — N40.0 BENIGN PROSTATIC HYPERPLASIA, UNSPECIFIED WHETHER LOWER URINARY TRACT SYMPTOMS PRESENT: Primary | ICD-10-CM

## 2024-11-05 LAB
POC APPEARANCE, URINE: CLEAR
POC BILIRUBIN, URINE: NEGATIVE
POC BLOOD, URINE: ABNORMAL
POC COLOR, URINE: YELLOW
POC GLUCOSE, URINE: NEGATIVE MG/DL
POC KETONES, URINE: NEGATIVE MG/DL
POC LEUKOCYTES, URINE: NEGATIVE
POC NITRITE,URINE: NEGATIVE
POC PH, URINE: 6.5 PH
POC PROTEIN, URINE: NEGATIVE MG/DL
POC SPECIFIC GRAVITY, URINE: 1.02
POC UROBILINOGEN, URINE: 0.2 EU/DL

## 2024-11-05 PROCEDURE — 81002 URINALYSIS NONAUTO W/O SCOPE: CPT | Performed by: STUDENT IN AN ORGANIZED HEALTH CARE EDUCATION/TRAINING PROGRAM

## 2024-11-05 PROCEDURE — 51798 US URINE CAPACITY MEASURE: CPT | Performed by: STUDENT IN AN ORGANIZED HEALTH CARE EDUCATION/TRAINING PROGRAM

## 2024-11-05 PROCEDURE — 3044F HG A1C LEVEL LT 7.0%: CPT | Performed by: STUDENT IN AN ORGANIZED HEALTH CARE EDUCATION/TRAINING PROGRAM

## 2024-11-05 PROCEDURE — 51736 URINE FLOW MEASUREMENT: CPT | Performed by: STUDENT IN AN ORGANIZED HEALTH CARE EDUCATION/TRAINING PROGRAM

## 2024-11-05 PROCEDURE — 99214 OFFICE O/P EST MOD 30 MIN: CPT | Performed by: STUDENT IN AN ORGANIZED HEALTH CARE EDUCATION/TRAINING PROGRAM

## 2024-11-05 PROCEDURE — 1157F ADVNC CARE PLAN IN RCRD: CPT | Performed by: STUDENT IN AN ORGANIZED HEALTH CARE EDUCATION/TRAINING PROGRAM

## 2024-11-05 NOTE — PROGRESS NOTES
Scribed for Dr. Rocco Davison by Isrrael Tee. I, Dr. Rocco Davison have personally reviewed and agreed with the information entered by the Virtual Scribe. 11/05/24.    ASSESSMENT:  Problem List Items Addressed This Visit       BPH (benign prostatic hyperplasia) - Primary    Relevant Orders    POCT UA (nonautomated) manually resulted (Completed)    Measure post void residual (Completed)       BPH with LUTS ~ s/p HoLEP 04/13/2023   BNC ~ s/p bladder neck incision on 10/19/2023.     PLAN:  #BNC, recurrent  s/p repeat BNI with me. (09/24/24)  Doing well overall, obstructive symptoms resolved.   Remains satisfied with results of procedure.   RTC in 2-3 months for reassessment and flow/pvr.     #OAB symptoms  C/o bothersome frequency/urgency following BNI.  Discussed options, he is not interested in starting any new medications at this time.   This is reasonable given that I suspect his OAB symptoms will continue to gradually improve;  May take up to 6-12 months post-op to completely resolve.   Next visit, can revisit treatment options if symptoms persist or worsen.     All questions were answered to the patient’s satisfaction.  Patient agrees with the plan and wishes to proceed.  Continue follow-up for ongoing care of his chronic medical conditions.       History of Present Illness (HPI):  Mohit presents for a post-op visit.  The patient’s EMR has been reviewed.  Lives in [place], [place].     Hx of BPH with LUTS, and BNC  S/p HoLEP (04/13/23), and BNI (10/19/23) with Dr. Leon.    Referred to me for concern of stricture/BNC recurrence.     Developed recurrent obstructive urinary symptoms s/p BNI.   C/o weak stream, sensation of incomplete emptying.   Associated w/ frequency, urgency, and nocturia x2.   S/p cystoscopy with Dr. Leon (07/17/24).   Noted mild BNC, unable to negotiate scope passed.   The rest of the prostatic fossa was widely patent.   Referred to me for continued care and surgical consideration.     Now  s/p repeat BNI with me. (09/24/24)    TODAY: (11/05/24)  Presents for a post-op visit and flow/pvr.   Reports he has been doing well overall.   Stream remains okay, feels he empties well.  Primarily bothered by his persistent frequency and urgency.   Albeit, remains OAB symptoms tolerable;  He is not interested in starting any new medications at this time.   Reasonable, given that I suspect his OAB symptoms will continue to gradually improve s/p BNI.    Denies any recent infections/UTI in the interim.   Denies any gross hematuria, flank pain, fevers or chills.     Uroflow results:   Normal bell-curve  Qmax: 13 mL/s  VV: 72 mL  PVR: 3 mL    TO REVIEW: Initial visit (07/31/24)  Reports he has been doing well overall.   Predominantly c/o of gradually weakening stream.   Denotes significant benefit from BNI in the past.   Expressed concern of scar tissue formation given symptoms.   Denies any recent infections, gross hematuria, fevers or chills.     PMH: HLD, BRUCE.  PSH: HoLEP, hernia repair  FH: Denies FH of  malignancy.   SH: Former smoker.     Past Medical History:   Diagnosis Date    Abnormal finding of blood chemistry, unspecified 11/30/2022    Abnormal blood chemistry    Adjustment disorder, unspecified 12/17/2021    Adult situational stress disorder    Allergic rhinitis, unspecified 09/01/2017    Allergic rhinitis    Benign prostatic hyperplasia without lower urinary tract symptoms 12/16/2022    BPH (benign prostatic hyperplasia)    Cataract     Encounter for general adult medical examination without abnormal findings 11/30/2022    Welcome to Medicare preventive visit    Encounter for screening for malignant neoplasm of prostate 10/12/2022    Screening for prostate cancer    Nocturia 09/01/2017    Nocturia    Obstructive sleep apnea syndrome 10/04/2023    Other abnormality of red blood cells 10/12/2022    Elevated MCV    Pain in right knee 01/05/2023    Bilateral knee pain    Poor urinary stream 12/16/2022     Weak urine stream    Pure hypercholesterolemia, unspecified 10/12/2022    Elevated LDL cholesterol level    Sleep disorder, unspecified 2021    Sleep disturbance    Type 2 diabetes mellitus without complication, unspecified whether long term insulin use (Multi) 10/4/2024    Unspecified abnormal findings in urine 09/10/2020    Urine abnormality    Unspecified skin changes 2021    Skin change     Past Surgical History:   Procedure Laterality Date    LASER OF PROSTATE W/ GREEN LIGHT PVP      Holmium laser of prostate(not green light)    OTHER SURGICAL HISTORY  2018    Hernia repair     Family History   Problem Relation Name Age of Onset    Hypertension Mother      Diabetes Sister      Hashimoto's thyroiditis Sister      Other (cardiac disorder) Other      Other (allergy) Other      Hypertension Other       Social History     Tobacco Use   Smoking Status Former    Current packs/day: 0.00    Average packs/day: 0.4 packs/day for 30.0 years (12.5 ttl pk-yrs)    Types: Cigarettes    Start date: 1977    Quit date: 1987    Years since quittin.2   Smokeless Tobacco Never   Tobacco Comments    Casual smoker     Current Outpatient Medications   Medication Sig Dispense Refill    [START ON 12/10/2024] buPROPion SR (Wellbutrin SR) 100 mg 12 hr tablet Take 1 tablet (100 mg) by mouth 2 times a day. Do not crush, chew, or split. Do not fill before December 10, 2024. 180 tablet 1    tadalafil (Cialis) 20 mg tablet Take 1 tablet (20 mg) by mouth once daily as needed for erectile dysfunction. 30 tablet 8    [START ON 12/10/2024] traZODone (Desyrel) 100 mg tablet Take 1 tablet (100 mg) by mouth once daily at bedtime. Do not fill before December 10, 2024. 90 tablet 1     No current facility-administered medications for this visit.     Allergies   Allergen Reactions    Fluoxetine Hcl Unknown     sex dysfunction    House Dust Mite Cough     Past medical, surgical, family and social history in the chart was  reviewed and is accurate including any additions to what is in this HPI.    REVIEW OF SYSTEMS (ROS):   Constitutional: denies any unintentional weight loss or change in strength.  Integumentary: denies any rashes or pruritus.  Eyes: denies any double vision or eye pain.  Ear/Nose/Mouth/Throat: denies any nosebleeds or gum bleeds.  Cardiovascular: denies any chest pain or syncope.  Respiratory: denies hemoptysis.  Gastrointestinal: denies nausea or vomiting.  Musculoskeletal: denies muscle cramping or weakness.  Neurologic: denies convulsions or seizures.  Hematologic/Lymphatic: denies bleeding tendencies.  Endocrine: denies heat/cold intolerance.  All other systems have been reviewed and are negative unless otherwise noted in the HPI.     OBJECTIVE:    PHYSICAL EXAM:  Constitutional: No obvious distress.  Eyes: Non-injected conjunctiva, sclera clear, EOMI.  Ears/Nose/Mouth/Throat: No obvious drainage per ears or nose.  Cardiovascular: Extremities are warm and well perfused. No edema, cyanosis or pallor.  Respiratory: No audible wheezing/stridor; respirations do not appear labored.  Gastrointestinal: Abdomen soft, not distended.  Musculoskeletal: Normal ROM of extremities.  Skin: No obvious rashes or open sores.  Neurologic: Alert and oriented, CN 2-12 grossly intact.  Psychiatric: Answers questions appropriately with normal affect.  Hematologic/Lymphatic/Immunologic: No obvious bruises or sites of spontaneous bleeding.  Genitourinary: No CVA tenderness, bladder not palpable.     Labs and imaging:  Lab Results   Component Value Date    WBC 5.7 09/11/2024    HGB 13.7 09/11/2024    HCT 42.0 09/11/2024     09/11/2024    CHOL 151 12/04/2023    TRIG 107 12/04/2023    HDL 59.0 12/04/2023    ALT 12 09/11/2024    AST 11 09/11/2024     09/11/2024    K 4.2 09/11/2024     09/11/2024    CREATININE 0.98 09/11/2024    BUN 16 09/11/2024    CO2 26 09/11/2024    TSH 1.84 12/04/2023    PSA 0.45 07/25/2023    INR 1.0  04/04/2023    HGBA1C 5.2 10/03/2024       Scribed for Dr. Rocco Davison by Isrrael Tee.  I, Dr. Rocco Davison have personally reviewed and agreed with the information entered by the Virtual Scribe. 11/05/24

## 2024-12-02 ENCOUNTER — APPOINTMENT (OUTPATIENT)
Dept: PRIMARY CARE | Facility: CLINIC | Age: 69
End: 2024-12-02
Payer: MEDICARE

## 2024-12-02 VITALS
RESPIRATION RATE: 16 BRPM | WEIGHT: 202 LBS | HEIGHT: 71 IN | SYSTOLIC BLOOD PRESSURE: 132 MMHG | BODY MASS INDEX: 28.28 KG/M2 | DIASTOLIC BLOOD PRESSURE: 76 MMHG

## 2024-12-02 DIAGNOSIS — E78.00 ELEVATED LDL CHOLESTEROL LEVEL: ICD-10-CM

## 2024-12-02 DIAGNOSIS — R53.83 FATIGUE, UNSPECIFIED TYPE: ICD-10-CM

## 2024-12-02 DIAGNOSIS — Z00.00 MEDICARE ANNUAL WELLNESS VISIT, SUBSEQUENT: Primary | ICD-10-CM

## 2024-12-02 DIAGNOSIS — Z12.5 SCREENING FOR PROSTATE CANCER: ICD-10-CM

## 2024-12-02 DIAGNOSIS — R03.0 BLOOD PRESSURE ELEVATED WITHOUT HISTORY OF HTN: ICD-10-CM

## 2024-12-02 PROCEDURE — 1157F ADVNC CARE PLAN IN RCRD: CPT | Performed by: INTERNAL MEDICINE

## 2024-12-02 PROCEDURE — 1160F RVW MEDS BY RX/DR IN RCRD: CPT | Performed by: INTERNAL MEDICINE

## 2024-12-02 PROCEDURE — G0439 PPPS, SUBSEQ VISIT: HCPCS | Performed by: INTERNAL MEDICINE

## 2024-12-02 PROCEDURE — 3044F HG A1C LEVEL LT 7.0%: CPT | Performed by: INTERNAL MEDICINE

## 2024-12-02 PROCEDURE — 1036F TOBACCO NON-USER: CPT | Performed by: INTERNAL MEDICINE

## 2024-12-02 PROCEDURE — 3008F BODY MASS INDEX DOCD: CPT | Performed by: INTERNAL MEDICINE

## 2024-12-02 PROCEDURE — 3078F DIAST BP <80 MM HG: CPT | Performed by: INTERNAL MEDICINE

## 2024-12-02 PROCEDURE — 1159F MED LIST DOCD IN RCRD: CPT | Performed by: INTERNAL MEDICINE

## 2024-12-02 PROCEDURE — 1170F FXNL STATUS ASSESSED: CPT | Performed by: INTERNAL MEDICINE

## 2024-12-02 PROCEDURE — 1124F ACP DISCUSS-NO DSCNMKR DOCD: CPT | Performed by: INTERNAL MEDICINE

## 2024-12-02 PROCEDURE — 3075F SYST BP GE 130 - 139MM HG: CPT | Performed by: INTERNAL MEDICINE

## 2024-12-02 PROCEDURE — 99213 OFFICE O/P EST LOW 20 MIN: CPT | Performed by: INTERNAL MEDICINE

## 2024-12-02 ASSESSMENT — ACTIVITIES OF DAILY LIVING (ADL)
TAKING_MEDICATION: INDEPENDENT
DOING_HOUSEWORK: INDEPENDENT
DRESSING: INDEPENDENT
BATHING: INDEPENDENT
MANAGING_FINANCES: INDEPENDENT
GROCERY_SHOPPING: INDEPENDENT

## 2024-12-02 ASSESSMENT — ENCOUNTER SYMPTOMS
LOSS OF SENSATION IN FEET: 0
OCCASIONAL FEELINGS OF UNSTEADINESS: 0
DEPRESSION: 0

## 2024-12-02 NOTE — PROGRESS NOTES
Subjective   Patient ID: Mohit Villatoro is a 69 y.o. male who presents for Medicare Annual Wellness Visit Subsequent.    HPI  Patient in for a visit  Took a while for the cough to go still has some but a lot better  Blood pressure is elevated       Patient comes in for an [facility] Annual Wellness Visit  last one done over a year ago , doing well over-all with no particular complaints. Also is in for laboratory review and health maintenance update.  Updating family history as well.  Interval event - past medical history, surgical, social, and family history reviewed and updated.  Interval care -  Patient is    up to date with dental care.  Patient does     receive routine vision care.        Review of Systems  General: Denies fever, chills, night sweats,  Eyes: Negative for recent visual changes  Ears, Nose, Throat :  Negative for hearing changes, sinus discomfort  Dermatologic: Negative for new skin conditions, rash  Respiratory: Negative for wheezing, shortness of breath, cough  Cardiovascular: Negative for chest pain, palpitations, or leg swelling  Gastrointestinal: Negative for nausea/vomiting, abdominal pain, changes in bowel habits  Genitourinary Negative for Urinary Incontinence  urgency , frequency, discomfort   Musculoskeletal: see hpi  Neurological: Negative for headaches, dizziness    Previous history  Past Medical History:   Diagnosis Date    Abnormal finding of blood chemistry, unspecified 11/30/2022    Abnormal blood chemistry    Adjustment disorder, unspecified 12/17/2021    Adult situational stress disorder    Allergic rhinitis, unspecified 09/01/2017    Allergic rhinitis    Benign prostatic hyperplasia without lower urinary tract symptoms 12/16/2022    BPH (benign prostatic hyperplasia)    Cataract     Encounter for general adult medical examination without abnormal findings 11/30/2022    Welcome to Medicare preventive visit    Encounter for screening for malignant neoplasm of prostate 10/12/2022     Screening for prostate cancer    Nocturia 2017    Nocturia    Obstructive sleep apnea syndrome 10/04/2023    Other abnormality of red blood cells 10/12/2022    Elevated MCV    Pain in right knee 2023    Bilateral knee pain    Poor urinary stream 2022    Weak urine stream    Pure hypercholesterolemia, unspecified 10/12/2022    Elevated LDL cholesterol level    Sleep disorder, unspecified 2021    Sleep disturbance    Type 2 diabetes mellitus without complication, unspecified whether long term insulin use (Multi) 10/4/2024    Unspecified abnormal findings in urine 09/10/2020    Urine abnormality    Unspecified skin changes 2021    Skin change     Past Surgical History:   Procedure Laterality Date    LASER OF PROSTATE W/ GREEN LIGHT PVP      Holmium laser of prostate(not green light)    OTHER SURGICAL HISTORY  2018    Hernia repair     Social History     Tobacco Use    Smoking status: Former     Current packs/day: 0.00     Average packs/day: 0.4 packs/day for 30.0 years (12.5 ttl pk-yrs)     Types: Cigarettes     Start date: 1977     Quit date: 1987     Years since quittin.2    Smokeless tobacco: Never    Tobacco comments:     Casual smoker   Vaping Use    Vaping status: Never Used   Substance Use Topics    Alcohol use: Yes     Alcohol/week: 4.0 standard drinks of alcohol     Types: 4 Glasses of wine per week     Comment: occasionally 1-4    Drug use: Not Currently     Family History   Problem Relation Name Age of Onset    Hypertension Mother      Diabetes Sister      Hashimoto's thyroiditis Sister      Other (cardiac disorder) Other      Other (allergy) Other      Hypertension Other       Allergies   Allergen Reactions    Fluoxetine Hcl Unknown     sex dysfunction    House Dust Mite Cough     Current Outpatient Medications   Medication Instructions    [START ON 12/10/2024] buPROPion SR (WELLBUTRIN SR) 100 mg, oral, 2 times daily, Do not crush, chew, or split.     tadalafil (CIALIS) 20 mg, oral, Daily PRN    [START ON 12/10/2024] traZODone (DESYREL) 100 mg, oral, Nightly       Objective       Physical Exam  Vital Signs: as recorded above  General: Well groomed, well nourished   Orientation:  Alert , oriented to time, place , and person   Mood and Affect:  Cooperative , no apparent distress normal affect  Skin: Good color, good turgor  Eyes: Extra ocular muscle movements intact, anicteric sclerae  Neck: Supple, full range of movement  Chest: Normal breath sounds, normal chest wall exam, symmetric, good air entry, clear to auscultation  Heart: Regular rate and rhythm, without murmur, gallop, or rubs  Abdomen soft nontender no masses felt no hepatosplenomegaly, no rebound or guarding  BACK:  no CTLS spine tenderness, no flank tenderness  Extremities: full range of movement  bilateral UE and bilateral LE,  no lower extremity edema  Neurological: Alert, oriented, cranial nerves II-XII grossly intact except for visual acuity  Sensation:  Intact   Gait: normal steady        Assessment/Plan   Mohit Villatoro is a 69 y.o. male who presents for the concerns below:    Problem List Items Addressed This Visit    None           Discussed with:   Return in :  6 months since seeing other specialists   Portions of this note were generated using digital voice recognition software, and may contain grammatical errors       Valentin Vazquez MD  12/02/24  2:43 PM

## 2024-12-09 ENCOUNTER — LAB (OUTPATIENT)
Dept: LAB | Facility: LAB | Age: 69
End: 2024-12-09
Payer: MEDICARE

## 2024-12-09 DIAGNOSIS — R53.83 FATIGUE, UNSPECIFIED TYPE: ICD-10-CM

## 2024-12-09 DIAGNOSIS — E78.00 ELEVATED LDL CHOLESTEROL LEVEL: ICD-10-CM

## 2024-12-09 DIAGNOSIS — R03.0 BLOOD PRESSURE ELEVATED WITHOUT HISTORY OF HTN: ICD-10-CM

## 2024-12-09 DIAGNOSIS — Z12.5 SCREENING FOR PROSTATE CANCER: ICD-10-CM

## 2024-12-09 LAB
ALBUMIN SERPL BCP-MCNC: 4.4 G/DL (ref 3.4–5)
ALP SERPL-CCNC: 67 U/L (ref 33–136)
ALT SERPL W P-5'-P-CCNC: 12 U/L (ref 10–52)
ANION GAP SERPL CALC-SCNC: 12 MMOL/L (ref 10–20)
AST SERPL W P-5'-P-CCNC: 13 U/L (ref 9–39)
BASOPHILS # BLD AUTO: 0.03 X10*3/UL (ref 0–0.1)
BASOPHILS NFR BLD AUTO: 0.4 %
BILIRUB SERPL-MCNC: 0.9 MG/DL (ref 0–1.2)
BUN SERPL-MCNC: 12 MG/DL (ref 6–23)
CALCIUM SERPL-MCNC: 9.3 MG/DL (ref 8.6–10.6)
CHLORIDE SERPL-SCNC: 105 MMOL/L (ref 98–107)
CHOLEST SERPL-MCNC: 169 MG/DL (ref 0–199)
CHOLESTEROL/HDL RATIO: 2.8
CO2 SERPL-SCNC: 27 MMOL/L (ref 21–32)
CREAT SERPL-MCNC: 0.88 MG/DL (ref 0.5–1.3)
EGFRCR SERPLBLD CKD-EPI 2021: >90 ML/MIN/1.73M*2
EOSINOPHIL # BLD AUTO: 0.18 X10*3/UL (ref 0–0.7)
EOSINOPHIL NFR BLD AUTO: 2.3 %
ERYTHROCYTE [DISTWIDTH] IN BLOOD BY AUTOMATED COUNT: 13.5 % (ref 11.5–14.5)
GLUCOSE SERPL-MCNC: 98 MG/DL (ref 74–99)
HCT VFR BLD AUTO: 46.9 % (ref 41–52)
HDLC SERPL-MCNC: 60.9 MG/DL
HGB BLD-MCNC: 15.8 G/DL (ref 13.5–17.5)
IMM GRANULOCYTES # BLD AUTO: 0.02 X10*3/UL (ref 0–0.7)
IMM GRANULOCYTES NFR BLD AUTO: 0.3 % (ref 0–0.9)
LDLC SERPL CALC-MCNC: 92 MG/DL
LYMPHOCYTES # BLD AUTO: 1.91 X10*3/UL (ref 1.2–4.8)
LYMPHOCYTES NFR BLD AUTO: 24.8 %
MCH RBC QN AUTO: 31.7 PG (ref 26–34)
MCHC RBC AUTO-ENTMCNC: 33.7 G/DL (ref 32–36)
MCV RBC AUTO: 94 FL (ref 80–100)
MONOCYTES # BLD AUTO: 0.57 X10*3/UL (ref 0.1–1)
MONOCYTES NFR BLD AUTO: 7.4 %
NEUTROPHILS # BLD AUTO: 4.99 X10*3/UL (ref 1.2–7.7)
NEUTROPHILS NFR BLD AUTO: 64.8 %
NON HDL CHOLESTEROL: 108 MG/DL (ref 0–149)
NRBC BLD-RTO: 0 /100 WBCS (ref 0–0)
PLATELET # BLD AUTO: 194 X10*3/UL (ref 150–450)
POTASSIUM SERPL-SCNC: 4.2 MMOL/L (ref 3.5–5.3)
PROT SERPL-MCNC: 6.3 G/DL (ref 6.4–8.2)
PSA SERPL-MCNC: 0.59 NG/ML
RBC # BLD AUTO: 4.98 X10*6/UL (ref 4.5–5.9)
SODIUM SERPL-SCNC: 140 MMOL/L (ref 136–145)
TRIGL SERPL-MCNC: 79 MG/DL (ref 0–149)
TSH SERPL-ACNC: 1.82 MIU/L (ref 0.44–3.98)
VLDL: 16 MG/DL (ref 0–40)
WBC # BLD AUTO: 7.7 X10*3/UL (ref 4.4–11.3)

## 2024-12-09 PROCEDURE — G0103 PSA SCREENING: HCPCS

## 2024-12-09 PROCEDURE — 85025 COMPLETE CBC W/AUTO DIFF WBC: CPT

## 2024-12-09 PROCEDURE — 80061 LIPID PANEL: CPT

## 2024-12-09 PROCEDURE — 84443 ASSAY THYROID STIM HORMONE: CPT

## 2024-12-09 PROCEDURE — 36415 COLL VENOUS BLD VENIPUNCTURE: CPT

## 2024-12-09 PROCEDURE — 80053 COMPREHEN METABOLIC PANEL: CPT

## 2025-01-06 ENCOUNTER — APPOINTMENT (OUTPATIENT)
Dept: UROLOGY | Facility: CLINIC | Age: 70
End: 2025-01-06
Payer: MEDICARE

## 2025-01-06 VITALS — TEMPERATURE: 96.8 F

## 2025-01-06 DIAGNOSIS — N13.8 BPH WITH OBSTRUCTION/LOWER URINARY TRACT SYMPTOMS: ICD-10-CM

## 2025-01-06 DIAGNOSIS — N40.1 BPH WITH OBSTRUCTION/LOWER URINARY TRACT SYMPTOMS: ICD-10-CM

## 2025-01-06 PROCEDURE — 99213 OFFICE O/P EST LOW 20 MIN: CPT | Performed by: STUDENT IN AN ORGANIZED HEALTH CARE EDUCATION/TRAINING PROGRAM

## 2025-01-06 PROCEDURE — 51736 URINE FLOW MEASUREMENT: CPT | Performed by: STUDENT IN AN ORGANIZED HEALTH CARE EDUCATION/TRAINING PROGRAM

## 2025-01-06 PROCEDURE — 1036F TOBACCO NON-USER: CPT | Performed by: STUDENT IN AN ORGANIZED HEALTH CARE EDUCATION/TRAINING PROGRAM

## 2025-01-06 PROCEDURE — 1160F RVW MEDS BY RX/DR IN RCRD: CPT | Performed by: STUDENT IN AN ORGANIZED HEALTH CARE EDUCATION/TRAINING PROGRAM

## 2025-01-06 PROCEDURE — 51798 US URINE CAPACITY MEASURE: CPT | Performed by: STUDENT IN AN ORGANIZED HEALTH CARE EDUCATION/TRAINING PROGRAM

## 2025-01-06 PROCEDURE — 1159F MED LIST DOCD IN RCRD: CPT | Performed by: STUDENT IN AN ORGANIZED HEALTH CARE EDUCATION/TRAINING PROGRAM

## 2025-01-06 PROCEDURE — 1157F ADVNC CARE PLAN IN RCRD: CPT | Performed by: STUDENT IN AN ORGANIZED HEALTH CARE EDUCATION/TRAINING PROGRAM

## 2025-01-06 PROCEDURE — 1126F AMNT PAIN NOTED NONE PRSNT: CPT | Performed by: STUDENT IN AN ORGANIZED HEALTH CARE EDUCATION/TRAINING PROGRAM

## 2025-01-06 ASSESSMENT — PAIN SCALES - GENERAL: PAINLEVEL_OUTOF10: 0-NO PAIN

## 2025-01-06 NOTE — PROGRESS NOTES
Scribed for Dr. Rocco Davison by Isrrael Tee. I, Dr. Rocco Davison have personally reviewed and agreed with the information entered by the Virtual Scribe. 01/06/25.    ASSESSMENT:  Problem List Items Addressed This Visit       BPH with obstruction/lower urinary tract symptoms    Relevant Orders    Measure post void residual (Completed)     BPH with LUTS ~ s/p HoLEP 04/13/2023   BNC ~ s/p bladder neck incision on 10/19/2023.     PLAN:  #BNC, recurrent  s/p repeat BNI with me. (09/24/24)  Remains satisfied with results of procedure. No symptoms or signs of recurrent contracture.   Denies any recurrent obstructive symptoms to date.   RTC in 6 months for reassessment and flow/pvr.    #OAB symptoms  C/o bothersome frequency/urgency following BNI.  Discussed options, he is not interested in starting any new medications at this time.   This is reasonable given that I suspect his OAB symptoms will continue to gradually improve;  May take up to 6-12 months post-op to completely resolve.   Next visit, can revisit treatment options if symptoms persist or worsen.     #Prostate cancer screening  Continue annual PSA screening.     PSA summary:  0.59 ~ Dec 2024  0.60 ~ Dec 2023    All questions were answered to the patient’s satisfaction.  Patient agrees with the plan and wishes to proceed.  Continue follow-up for ongoing care of his chronic medical conditions.       History of Present Illness (HPI):  Mohit presents for a follow up visit.  The patient’s EMR has been reviewed.  Lives in [place], [place].     Hx of BPH with LUTS, and BNC  S/p HoLEP (04/13/23), and BNI (10/19/23) with Dr. Leon.    Referred to me for concern of stricture/BNC recurrence.     Developed recurrent obstructive urinary symptoms s/p BNI.   C/o weak stream, sensation of incomplete emptying.   Associated w/ frequency, urgency, and nocturia x2.   S/p cystoscopy with Dr. Leon (07/17/24).   Noted mild BNC, unable to negotiate scope passed.   The rest of the  prostatic fossa was widely patent.   Referred to me for continued care and surgical consideration.     Now s/p repeat BNI with me. (09/24/24)    TODAY: (01/06/25)  Presents for follow up and flow/pvr.   Reports he has been doing well overall.   Remains satisfied with results of the BNI.   Denies any bothersome obstructive symptoms.   No UTI's or gross hematuria in the interim.     Uroflow results:   Normal bell curve  Qmax: 9 mL/s  VV: 40 mL (sub-optimal)  PVR: 1 mL    TO REVIEW: Last visit (11/05/24)  Presents for a post-op visit and flow/pvr.   Reports he has been doing well overall.   Stream remains okay, feels he empties well.  Primarily bothered by his persistent frequency and urgency.   Albeit, remains OAB symptoms tolerable;  He is not interested in starting any new medications at this time.   Reasonable, given that I suspect his OAB symptoms will continue to gradually improve s/p BNI.    Denies any recent infections/UTI in the interim.   Denies any gross hematuria, flank pain, fevers or chills.     Uroflow results:   Normal bell-curve  Qmax: 13 mL/s  VV: 72 mL  PVR: 3 mL    TO REVIEW: Initial visit (07/31/24)  Reports he has been doing well overall.   Predominantly c/o of gradually weakening stream.   Denotes significant benefit from BNI in the past.   Expressed concern of scar tissue formation given symptoms.   Denies any recent infections, gross hematuria, fevers or chills.     PMH: HLD, BRUCE.  PSH: HoLEP, hernia repair  FH: Denies FH of  malignancy.   SH: Former smoker.     Past Medical History:   Diagnosis Date    Abnormal finding of blood chemistry, unspecified 11/30/2022    Abnormal blood chemistry    Adjustment disorder, unspecified 12/17/2021    Adult situational stress disorder    Allergic rhinitis, unspecified 09/01/2017    Allergic rhinitis    Benign prostatic hyperplasia without lower urinary tract symptoms 12/16/2022    BPH (benign prostatic hyperplasia)    Cataract     Encounter for general  adult medical examination without abnormal findings 2022    Welcome to Medicare preventive visit    Encounter for screening for malignant neoplasm of prostate 10/12/2022    Screening for prostate cancer    Nocturia 2017    Nocturia    Obstructive sleep apnea syndrome 10/04/2023    Other abnormality of red blood cells 10/12/2022    Elevated MCV    Pain in right knee 2023    Bilateral knee pain    Poor urinary stream 2022    Weak urine stream    Pure hypercholesterolemia, unspecified 10/12/2022    Elevated LDL cholesterol level    Sleep disorder, unspecified 2021    Sleep disturbance    Type 2 diabetes mellitus without complication, unspecified whether long term insulin use (Multi) 10/4/2024    Unspecified abnormal findings in urine 09/10/2020    Urine abnormality    Unspecified skin changes 2021    Skin change     Past Surgical History:   Procedure Laterality Date    LASER OF PROSTATE W/ GREEN LIGHT PVP      Holmium laser of prostate(not green light)    OTHER SURGICAL HISTORY  2018    Hernia repair     Family History   Problem Relation Name Age of Onset    Hypertension Mother      Heart failure Mother      Emphysema Mother      Diabetes Sister      Hashimoto's thyroiditis Sister      No Known Problems Son Jacob     No Known Problems Son Jamil     No Known Problems Son Lanre     Other (cardiac disorder) Other      Other (allergy) Other      Hypertension Other       Social History     Tobacco Use   Smoking Status Former    Current packs/day: 0.00    Average packs/day: 0.4 packs/day for 30.0 years (12.5 ttl pk-yrs)    Types: Cigarettes    Start date: 1977    Quit date: 1987    Years since quittin.3   Smokeless Tobacco Never   Tobacco Comments    Casual smoker     Current Outpatient Medications   Medication Sig Dispense Refill    buPROPion SR (Wellbutrin SR) 100 mg 12 hr tablet Take 1 tablet (100 mg) by mouth 2 times a day. Do not crush, chew, or split. Do not fill  before December 10, 2024. 180 tablet 1    tadalafil (Cialis) 20 mg tablet Take 1 tablet (20 mg) by mouth once daily as needed for erectile dysfunction. 30 tablet 8    traZODone (Desyrel) 100 mg tablet Take 1 tablet (100 mg) by mouth once daily at bedtime. Do not fill before December 10, 2024. 90 tablet 1     No current facility-administered medications for this visit.     Allergies   Allergen Reactions    Fluoxetine Hcl Unknown     sex dysfunction    House Dust Mite Cough     Past medical, surgical, family and social history in the chart was reviewed and is accurate including any additions to what is in this HPI.    REVIEW OF SYSTEMS (ROS):   Constitutional: denies any unintentional weight loss or change in strength.  Integumentary: denies any rashes or pruritus.  Eyes: denies any double vision or eye pain.  Ear/Nose/Mouth/Throat: denies any nosebleeds or gum bleeds.  Cardiovascular: denies any chest pain or syncope.  Respiratory: denies hemoptysis.  Gastrointestinal: denies nausea or vomiting.  Musculoskeletal: denies muscle cramping or weakness.  Neurologic: denies convulsions or seizures.  Hematologic/Lymphatic: denies bleeding tendencies.  Endocrine: denies heat/cold intolerance.  All other systems have been reviewed and are negative unless otherwise noted in the HPI.     OBJECTIVE:    PHYSICAL EXAM:  Constitutional: No obvious distress.  Eyes: Non-injected conjunctiva, sclera clear, EOMI.  Ears/Nose/Mouth/Throat: No obvious drainage per ears or nose.  Cardiovascular: Extremities are warm and well perfused. No edema, cyanosis or pallor.  Respiratory: No audible wheezing/stridor; respirations do not appear labored.  Gastrointestinal: Abdomen soft, not distended.  Musculoskeletal: Normal ROM of extremities.  Skin: No obvious rashes or open sores.  Neurologic: Alert and oriented, CN 2-12 grossly intact.  Psychiatric: Answers questions appropriately with normal affect.  Hematologic/Lymphatic/Immunologic: No obvious  bruises or sites of spontaneous bleeding.  Genitourinary: No CVA tenderness, bladder not palpable.     Labs and imaging:  Lab Results   Component Value Date    WBC 7.7 12/09/2024    HGB 15.8 12/09/2024    HCT 46.9 12/09/2024     12/09/2024    CHOL 169 12/09/2024    TRIG 79 12/09/2024    HDL 60.9 12/09/2024    ALT 12 12/09/2024    AST 13 12/09/2024     12/09/2024    K 4.2 12/09/2024     12/09/2024    CREATININE 0.88 12/09/2024    BUN 12 12/09/2024    CO2 27 12/09/2024    TSH 1.82 12/09/2024    PSA 0.45 07/25/2023    INR 1.0 04/04/2023    HGBA1C 5.2 10/03/2024       Scribed for Dr. Rocco Davison by Isrrael Tee.  I, Dr. Rocco Davison have personally reviewed and agreed with the information entered by the Virtual Scribe. 01/06/25

## 2025-01-08 DIAGNOSIS — G47.9 SLEEP DISTURBANCE: ICD-10-CM

## 2025-01-08 RX ORDER — TRAZODONE HYDROCHLORIDE 100 MG/1
100 TABLET ORAL NIGHTLY
Qty: 90 TABLET | Refills: 1 | Status: SHIPPED | OUTPATIENT
Start: 2025-01-08

## 2025-01-28 NOTE — PROGRESS NOTES
Subjective   Mohit Villatoro is a 69 y.o. male with history of ED, BPH with LUTs s/p HoLEP on 04/13/2023 and BNC s/p BNI on 10/19/2023 now s/p repeat BNI with Dr. Davison on 9/24/2024. Presenting today for a follow up visit. He reports complete resolution of all obstructive urinary symptoms. He has persistent urinary frequency, urgency and nocturia x1. Denies any recent gross hematuria, fevers, chills, urinary retention, intractable flank or abdominal pain, nausea or vomiting.          Past Medical History:   Diagnosis Date    Abnormal finding of blood chemistry, unspecified 11/30/2022    Abnormal blood chemistry    Adjustment disorder, unspecified 12/17/2021    Adult situational stress disorder    Allergic rhinitis, unspecified 09/01/2017    Allergic rhinitis    Benign prostatic hyperplasia without lower urinary tract symptoms 12/16/2022    BPH (benign prostatic hyperplasia)    Cataract     Encounter for general adult medical examination without abnormal findings 11/30/2022    Welcome to Medicare preventive visit    Encounter for screening for malignant neoplasm of prostate 10/12/2022    Screening for prostate cancer    Nocturia 09/01/2017    Nocturia    Obstructive sleep apnea syndrome 10/04/2023    Other abnormality of red blood cells 10/12/2022    Elevated MCV    Pain in right knee 01/05/2023    Bilateral knee pain    Poor urinary stream 12/16/2022    Weak urine stream    Pure hypercholesterolemia, unspecified 10/12/2022    Elevated LDL cholesterol level    Sleep disorder, unspecified 04/09/2021    Sleep disturbance    Type 2 diabetes mellitus without complication, unspecified whether long term insulin use (Multi) 10/4/2024    Unspecified abnormal findings in urine 09/10/2020    Urine abnormality    Unspecified skin changes 04/26/2021    Skin change     Past Surgical History:   Procedure Laterality Date    LASER OF PROSTATE W/ GREEN LIGHT PVP      Holmium laser of prostate(not green light)    OTHER SURGICAL  HISTORY  2018    Hernia repair     Family History   Problem Relation Name Age of Onset    Hypertension Mother      Heart failure Mother      Emphysema Mother      Diabetes Sister      Hashimoto's thyroiditis Sister      No Known Problems Son Jacob     No Known Problems Son Jamil     No Known Problems Son Lanre     Other (cardiac disorder) Other      Other (allergy) Other      Hypertension Other       Current Outpatient Medications   Medication Sig Dispense Refill    buPROPion SR (Wellbutrin SR) 100 mg 12 hr tablet Take 1 tablet (100 mg) by mouth 2 times a day. Do not crush, chew, or split. Do not fill before December 10, 2024. 180 tablet 1    tadalafil (Cialis) 20 mg tablet Take 1 tablet (20 mg) by mouth once daily as needed for erectile dysfunction. 30 tablet 8    traZODone (Desyrel) 100 mg tablet Take 1 tablet (100 mg) by mouth once daily at bedtime. 90 tablet 1     No current facility-administered medications for this visit.     Allergies   Allergen Reactions    Fluoxetine Hcl Unknown     sex dysfunction    House Dust Mite Cough     Social History     Socioeconomic History    Marital status:      Spouse name: Not on file    Number of children: Not on file    Years of education: Not on file    Highest education level: Not on file   Occupational History     Employer: [employer]   Tobacco Use    Smoking status: Former     Current packs/day: 0.00     Average packs/day: 0.4 packs/day for 30.0 years (12.5 ttl pk-yrs)     Types: Cigarettes     Start date: 1977     Quit date: 1987     Years since quittin.4    Smokeless tobacco: Never    Tobacco comments:     Casual smoker   Vaping Use    Vaping status: Never Used   Substance and Sexual Activity    Alcohol use: Yes     Alcohol/week: 4.0 standard drinks of alcohol     Types: 4 Glasses of wine per week     Comment: occasionally 1-4    Drug use: Not Currently    Sexual activity: Not Currently     Partners: Female     Birth  control/protection: Abstinence   Other Topics Concern    Not on file   Social History Narrative    Not on file     Social Drivers of Health     Financial Resource Strain: Not on file   Food Insecurity: Not on file   Transportation Needs: No Transportation Needs (11/7/2023)    OASIS : Transportation     Lack of Transportation (Medical): No     Lack of Transportation (Non-Medical): No     Patient Unable or Declines to Respond: No   Physical Activity: Not on file   Stress: Not on file   Social Connections: Feeling Somewhat Isolated (11/7/2023)    OASIS : Social Isolation     Frequency of experiencing loneliness or isolation: Sometimes   Intimate Partner Violence: Not on file   Housing Stability: Not on file       Review of Systems  Pertinent items are noted in HPI.    Objective             Lab Review  Lab Results   Component Value Date    WBC 7.7 12/09/2024    RBC 4.98 12/09/2024    HGB 15.8 12/09/2024    HCT 46.9 12/09/2024     12/09/2024      Lab Results   Component Value Date    BUN 12 12/09/2024    CREATININE 0.88 12/09/2024      Lab Results   Component Value Date    PSA 0.45 07/25/2023   IPSS 13 and 2  PVR 0 ML   Uroflow was poorly diagnostic, only voided 26 ml's.      Assessment/Plan   Diagnoses and all orders for this visit:  BPH with obstruction/lower urinary tract symptoms  -     Measure post void residual      BPH s/p HoLEP 04/13/2023   BNC s/p bladder neck incision on 10/19/2023 and repeat BNI on 9/24/24 with Dr. Davison    Patient has persistent OAB symptoms.     We discussed trial of Gemtesa 75 mg, explained. We discussed the risks of Gemtesa which include incomplete bladder emptying, sinus pain, dry mouth, sore throat, joint pain, diarrhea, constipation, bloating, and anxiety. Patient understands and desires to proceed.    We discussed bladder Botox injections, risks discussed. Patient will think about this and let us know if he would like to proceed.    Follow up virtually in 1 month  to assess his response     All questions were answered to the patient's satisfaction. Patient agrees with the plan and wishes to proceed. Follow-up will be scheduled appropriately.     E&M visit today is associated with current or anticipated ongoing medical care services related to a patient's single, serious condition or a complex condition.    Scribed for Dr. Leon by Dena Benitez. I , Dr Leon, have personally reviewed and agreed with the information entered by the Virtual Scribe.

## 2025-01-29 ENCOUNTER — APPOINTMENT (OUTPATIENT)
Dept: UROLOGY | Facility: CLINIC | Age: 70
End: 2025-01-29
Payer: MEDICARE

## 2025-01-29 VITALS — HEIGHT: 71 IN | BODY MASS INDEX: 28.17 KG/M2 | TEMPERATURE: 96.3 F

## 2025-01-29 DIAGNOSIS — R39.15 URINARY URGENCY: ICD-10-CM

## 2025-01-29 DIAGNOSIS — N13.8 BPH WITH OBSTRUCTION/LOWER URINARY TRACT SYMPTOMS: Primary | ICD-10-CM

## 2025-01-29 DIAGNOSIS — N40.1 BPH WITH OBSTRUCTION/LOWER URINARY TRACT SYMPTOMS: Primary | ICD-10-CM

## 2025-01-29 PROCEDURE — 1036F TOBACCO NON-USER: CPT | Performed by: UROLOGY

## 2025-01-29 PROCEDURE — 1157F ADVNC CARE PLAN IN RCRD: CPT | Performed by: UROLOGY

## 2025-01-29 PROCEDURE — 1159F MED LIST DOCD IN RCRD: CPT | Performed by: UROLOGY

## 2025-01-29 PROCEDURE — 1126F AMNT PAIN NOTED NONE PRSNT: CPT | Performed by: UROLOGY

## 2025-01-29 PROCEDURE — 51741 ELECTRO-UROFLOWMETRY FIRST: CPT | Performed by: UROLOGY

## 2025-01-29 PROCEDURE — 99214 OFFICE O/P EST MOD 30 MIN: CPT | Performed by: UROLOGY

## 2025-01-29 PROCEDURE — 51798 US URINE CAPACITY MEASURE: CPT | Performed by: UROLOGY

## 2025-01-29 ASSESSMENT — PAIN SCALES - GENERAL: PAINLEVEL_OUTOF10: 0-NO PAIN

## 2025-02-24 ENCOUNTER — APPOINTMENT (OUTPATIENT)
Dept: DERMATOLOGY | Facility: CLINIC | Age: 70
End: 2025-02-24
Payer: MEDICARE

## 2025-02-24 DIAGNOSIS — L57.8 DIFFUSE PHOTODAMAGE OF SKIN: ICD-10-CM

## 2025-02-24 DIAGNOSIS — L81.4 LENTIGO: ICD-10-CM

## 2025-02-24 DIAGNOSIS — D48.5 NEOPLASM OF UNCERTAIN BEHAVIOR OF SKIN: Primary | ICD-10-CM

## 2025-02-24 DIAGNOSIS — L73.9 FOLLICULITIS: ICD-10-CM

## 2025-02-24 DIAGNOSIS — L82.1 SEBORRHEIC KERATOSIS: ICD-10-CM

## 2025-02-24 DIAGNOSIS — D22.9 MELANOCYTIC NEVUS, UNSPECIFIED LOCATION: ICD-10-CM

## 2025-02-24 DIAGNOSIS — L57.0 ACTINIC KERATOSIS: ICD-10-CM

## 2025-02-24 RX ORDER — CLINDAMYCIN PHOSPHATE 10 UG/ML
LOTION TOPICAL DAILY
Qty: 60 ML | Refills: 11 | Status: SHIPPED | OUTPATIENT
Start: 2025-02-24 | End: 2026-02-24

## 2025-02-24 RX ORDER — BENZOYL PEROXIDE 50 MG/ML
1 LIQUID TOPICAL DAILY
Qty: 240 G | Refills: 11 | Status: SHIPPED | OUTPATIENT
Start: 2025-02-24 | End: 2026-02-24

## 2025-02-24 ASSESSMENT — DERMATOLOGY QUALITY OF LIFE (QOL) ASSESSMENT
RATE HOW BOTHERED YOU ARE BY EFFECTS OF YOUR SKIN PROBLEMS ON YOUR ACTIVITIES (EG, GOING OUT, ACCOMPLISHING WHAT YOU WANT, WORK ACTIVITIES OR YOUR RELATIONSHIPS WITH OTHERS): 0 - NEVER BOTHERED
RATE HOW EMOTIONALLY BOTHERED YOU ARE BY YOUR SKIN PROBLEM (FOR EXAMPLE, WORRY, EMBARRASSMENT, FRUSTRATION): 1
WHAT SINGLE SKIN CONDITION LISTED BELOW IS THE PATIENT ANSWERING THE QUALITY-OF-LIFE ASSESSMENT QUESTIONS ABOUT: ACNE
RATE HOW BOTHERED YOU ARE BY SYMPTOMS OF YOUR SKIN PROBLEM (EG, ITCHING, STINGING BURNING, HURTING OR SKIN IRRITATION): 2
RATE HOW BOTHERED YOU ARE BY SYMPTOMS OF YOUR SKIN PROBLEM (EG, ITCHING, STINGING BURNING, HURTING OR SKIN IRRITATION): 2
RATE HOW EMOTIONALLY BOTHERED YOU ARE BY YOUR SKIN PROBLEM (FOR EXAMPLE, WORRY, EMBARRASSMENT, FRUSTRATION): 1
WHAT SINGLE SKIN CONDITION LISTED BELOW IS THE PATIENT ANSWERING THE QUALITY-OF-LIFE ASSESSMENT QUESTIONS ABOUT: ACNE
DATE THE QUALITY-OF-LIFE ASSESSMENT WAS COMPLETED: 67260
RATE HOW BOTHERED YOU ARE BY EFFECTS OF YOUR SKIN PROBLEMS ON YOUR ACTIVITIES (EG, GOING OUT, ACCOMPLISHING WHAT YOU WANT, WORK ACTIVITIES OR YOUR RELATIONSHIPS WITH OTHERS): 0 - NEVER BOTHERED

## 2025-02-24 ASSESSMENT — ITCH NUMERIC RATING SCALE: HOW SEVERE IS YOUR ITCHING?: 0

## 2025-02-24 ASSESSMENT — PATIENT GLOBAL ASSESSMENT (PGA): WHAT IS THE PGA: PATIENT GLOBAL ASSESSMENT:  2 - MILD

## 2025-02-24 NOTE — PROGRESS NOTES
"Subjective     Mohit Villatoro is a 69 y.o. male who presents for the following: Skin Check.  He states he has not noticed any changes in any of his brown spots recently, including in size, shape, or color, and they are all asymptomatic with no associated bleeding, itching, or pain.  He reports he sometimes he gets \"boils\" and pimples on his back.      Review of Systems:  No other skin or systemic complaints other than what is documented elsewhere in the note.    The following portions of the chart were reviewed this encounter and updated as appropriate:       Skin Cancer History  No skin cancer on file.    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 Do not crush, chew, or split. Do not fill before December 10, 2024., Disp: 180 tablet, Rfl: 1    traZODone (Desyrel) 100 mg tablet, Take 1 tablet (100 mg) by mouth once daily at bedtime., Disp: 90 tablet, Rfl: 1    vibegron 75 mg tablet, Take 1 tablet (75 mg) by mouth early in the morning.., Disp: 30 tablet, Rfl: 0    benzoyl peroxide 5 % external wash, Apply 1 Application topically once daily. Dispense 240 mL, Disp: 240 g, Rfl: 11    clindamycin (Cleocin T) 1 % lotion, Apply topically once daily. 60g, Disp: 60 mL, Rfl: 11    tadalafil (Cialis) 20 mg tablet, Take 1 tablet (20 mg) by mouth once daily as needed for erectile dysfunction., Disp: 30 tablet, Rfl: 8     Objective   Well appearing patient in no apparent distress; mood and affect are within " normal limits.    A full examination was performed including scalp, face, eyes, ears, nose, lips, neck, chest, axillae, abdomen, back, bilateral upper extremities, and bilateral lower extremities. All findings within normal limits unless otherwise noted below.    Assessment/Plan   1. Neoplasm of uncertain behavior of skin  Left posterior proximal shoulder  1.4 cm dark brown pigmented, asymmetric patch with an asymmetric pigment network and irregular borders               Shave removal    Lesion length (cm):  1.4  Margin per side (cm):  0  Lesion diameter (cm):  1.4  Informed consent: discussed and consent obtained    Timeout: patient name, date of birth, surgical site, and procedure verified    Procedure prep:  Patient was prepped and draped  Anesthesia: the lesion was anesthetized in a standard fashion    Anesthetic:  1% lidocaine w/ epinephrine 1-100,000 local infiltration  Instrument used: flexible razor blade    Hemostasis achieved with: aluminum chloride    Outcome: patient tolerated procedure well    Post-procedure details: sterile dressing applied and wound care instructions given    Dressing type: bandage and petrolatum      Specimen 1 - Dermatopathology- DERM LAB  Differential Diagnosis: SK vs atypical nevus  Check Margins Yes/No?:    Comments:    Dermpath Lab: Routine Histopathology (formalin-fixed tissue)    2. Actinic keratosis (2)  Head - Anterior (Face) (2)  On his left nasal sidewall and left lateral cheek, there are 2 erythematous, gritty, scaly macules     Actinic Keratoses -left nasal sidewall and left lateral cheek.  The pre-cancerous nature of these lesions and treatment options were discussed with the patient today.  At this time, we recommend treatment with liquid nitrogen cryotherapy.  The patient expressed understanding, is in agreement with this plan, and wishes to proceed with cryotherapy today.    Destr of lesion - Head - Anterior (Face) (2)  Complexity: simple    Destruction method:  cryotherapy    Informed consent: discussed and consent obtained    Lesion destroyed using liquid nitrogen: Yes    Cryotherapy cycles:  1  Outcome: patient tolerated procedure well with no complications    Post-procedure details: wound care instructions given      3. Melanocytic nevus, unspecified location  Scattered on the patient's face, neck, trunk, and extremities, there are several small, round- to oval-shaped, brown-pigmented and pink-colored, symmetric, uniform-appearing macules and dome-shaped papules    Clinically benign- to slightly atypical-appearing nevi - the clinically benign- to slightly atypical-appearing nature of the patient's nevi was discussed with the patient today.  None of the patient's nevi, with the exception of the one noted above, meet threshold for biopsy today.  We emphasized the importance of performing monthly self-skin exams using the ABCDs of monitoring moles, which were reviewed with the patient today and an informational hand-out provided.  We also emphasized the importance of sun avoidance and sun protection with daily sunscreen use.  The patient expressed understanding and is in agreement with this plan.    4. Lentigo  Multiple tan- to light brown-colored, round- to oval-shaped, symmetric and uniform-appearing macules and small patches consistent with lentigines scattered in sun-exposed areas.    Solar Lentigines and photodamage.  The clinically benign-appearing nature of these lesions and their relation to chronic sun exposure were discussed with the patient today and reassurance provided.  None of these lesions meet threshold for biopsy today, and thus no treatment is medically indicated for these lesions at this time.  The signs and symptoms of skin cancer were reviewed and the patient was advised to practice sun protection and sun avoidance, use daily sunscreen, and perform regular self skin exams.  The patient was instructed to monitor these lesions for any changes, such as  in size, shape, or color, or associated symptoms and to call our office to schedule a return visit for re-evaluation if any such changes or symptoms are noticed in the future.  The patient expressed understanding and is in agreement with this plan.    5. Seborrheic keratosis  Scattered on the patient's face, neck, trunk, and extremities, there are multiple tan- to light brown-colored, hyperkeratotic, stuck-on appearing papules of varying size and shape    Seborrheic Keratoses - the benign nature of these lesions was discussed with the patient today and reassurance provided.  No treatment is medically indicated for these lesions at this time.    6. Folliculitis  Scattered on the patient's back, there are many follicular-based erythematous, inflammatory papules and pustules    Folliculitis - flare on back.  The bacterial nature of this condition and treatment options were discussed extensively with the patient today.  At this time, we recommend combination topical antibiotic therapy with Benzoyl Peroxide 5% creamy wash, which the patient was instructed to use to wash the affected areas once or twice daily, and Clindamycin 1% lotion, which the patient was instructed to apply twice daily to the affected areas or up to 3-4 times per day as needed for active lesions.  The risks, benefits, and side effects of these medications, including dryness and irritation with BP use, were discussed.  The patient expressed understanding and is in agreement with this plan.    benzoyl peroxide 5 % external wash  Apply 1 Application topically once daily. Dispense 240 mL    clindamycin (Cleocin T) 1 % lotion  Apply topically once daily. 60g    7. Diffuse photodamage of skin  Photodistributed  Diffuse photodamage with actinic changes with telangiectasia and mottled pigmentation in sun-exposed areas.    Photodamage.  The signs and symptoms of skin cancer were reviewed and the patient was advised to practice sun protection and sun avoidance,  use daily sunscreen, and perform regular self skin exams.  Sun protection was discussed, including avoiding the mid-day sun, wearing a sunscreen with SPF at least 50, and stressing the need for reapplication of sunscreen and applying more than they think they need.        Jessica Johnson MD      I saw and evaluated the patient. I personally obtained the key and critical portions of the history and physical exam or was physically present for key and critical portions performed by the resident/fellow. I reviewed the resident/fellow's documentation and discussed the patient with the resident/fellow. I agree with the resident/fellow's medical decision making as documented in the note.    Miguel Blas MD

## 2025-02-26 LAB
LABORATORY COMMENT REPORT: NORMAL
PATH REPORT.FINAL DX SPEC: NORMAL
PATH REPORT.GROSS SPEC: NORMAL
PATH REPORT.MICROSCOPIC SPEC OTHER STN: NORMAL
PATH REPORT.RELEVANT HX SPEC: NORMAL
PATH REPORT.TOTAL CANCER: NORMAL

## 2025-02-28 NOTE — PROGRESS NOTES
Subjective     This visit was completed via telemedicine. All issues as below were discussed and addressed but no physical exam was performed unless allowed by visual confirmation. If it was felt that the patient should be evaluated in clinic, then they were directed there. Patient verbally consented to visit.    Mohit Villatoro is a 69 y.o. male with history of ED, BPH with LUTs s/p HoLEP on 04/13/2023 and BNC s/p BNI on 10/19/2023 now s/p repeat BNI with Dr. Davison on 9/24/2024. During his last visit patient had complaints of persistent OAB symptoms; he was started on a trial of Gemtesa 75 mg and presents today to assess his response to the medication. He reports complete resolution of all bothersome urinary symptoms. Denies any recent gross hematuria, fevers, chills, urinary retention, intractable flank or abdominal pain, nausea or vomiting.            Past Medical History:   Diagnosis Date    Abnormal finding of blood chemistry, unspecified 11/30/2022    Abnormal blood chemistry    Adjustment disorder, unspecified 12/17/2021    Adult situational stress disorder    Allergic rhinitis, unspecified 09/01/2017    Allergic rhinitis    Benign prostatic hyperplasia without lower urinary tract symptoms 12/16/2022    BPH (benign prostatic hyperplasia)    Cataract     Encounter for general adult medical examination without abnormal findings 11/30/2022    Welcome to Medicare preventive visit    Encounter for screening for malignant neoplasm of prostate 10/12/2022    Screening for prostate cancer    Nocturia 09/01/2017    Nocturia    Obstructive sleep apnea syndrome 10/04/2023    Other abnormality of red blood cells 10/12/2022    Elevated MCV    Pain in right knee 01/05/2023    Bilateral knee pain    Poor urinary stream 12/16/2022    Weak urine stream    Pure hypercholesterolemia, unspecified 10/12/2022    Elevated LDL cholesterol level    Sleep disorder, unspecified 04/09/2021    Sleep disturbance    Type 2 diabetes  mellitus without complication, unspecified whether long term insulin use (Multi) 10/4/2024    Unspecified abnormal findings in urine 09/10/2020    Urine abnormality    Unspecified skin changes 04/26/2021    Skin change     Past Surgical History:   Procedure Laterality Date    LASER OF PROSTATE W/ GREEN LIGHT PVP      Holmium laser of prostate(not green light)    OTHER SURGICAL HISTORY  11/27/2018    Hernia repair     Family History   Problem Relation Name Age of Onset    Hypertension Mother      Heart failure Mother      Emphysema Mother      Diabetes Sister      Hashimoto's thyroiditis Sister      No Known Problems Son Jacob     No Known Problems Son Jamil     No Known Problems Son Lanre     Other (cardiac disorder) Other      Other (allergy) Other      Hypertension Other       Current Outpatient Medications   Medication Sig Dispense Refill    benzoyl peroxide 5 % external wash Apply 1 Application topically once daily. Dispense 240 mL 240 g 11    buPROPion SR (Wellbutrin SR) 100 mg 12 hr tablet Take 1 tablet (100 mg) by mouth 2 times a day. Do not crush, chew, or split. Do not fill before December 10, 2024. 180 tablet 1    clindamycin (Cleocin T) 1 % lotion Apply topically once daily. 60g 60 mL 11    tadalafil (Cialis) 20 mg tablet Take 1 tablet (20 mg) by mouth once daily as needed for erectile dysfunction. 30 tablet 8    traZODone (Desyrel) 100 mg tablet Take 1 tablet (100 mg) by mouth once daily at bedtime. 90 tablet 1     No current facility-administered medications for this visit.     Allergies   Allergen Reactions    Fluoxetine Hcl Unknown     sex dysfunction    House Dust Mite Cough     Social History     Socioeconomic History    Marital status:      Spouse name: Not on file    Number of children: Not on file    Years of education: Not on file    Highest education level: Not on file   Occupational History     Employer: [employer]   Tobacco Use    Smoking status: Former     Current packs/day:  0.00     Average packs/day: 0.4 packs/day for 30.0 years (12.5 ttl pk-yrs)     Types: Cigarettes     Start date: 1977     Quit date: 1987     Years since quittin.5    Smokeless tobacco: Never    Tobacco comments:     Casual smoker   Vaping Use    Vaping status: Never Used   Substance and Sexual Activity    Alcohol use: Yes     Alcohol/week: 4.0 standard drinks of alcohol     Types: 4 Glasses of wine per week     Comment: occasionally 1-4    Drug use: Not Currently    Sexual activity: Not Currently     Partners: Female     Birth control/protection: Abstinence   Other Topics Concern    Not on file   Social History Narrative    Not on file     Social Drivers of Health     Financial Resource Strain: Not on file   Food Insecurity: Not on file   Transportation Needs: No Transportation Needs (2023)    OASIS : Transportation     Lack of Transportation (Medical): No     Lack of Transportation (Non-Medical): No     Patient Unable or Declines to Respond: No   Physical Activity: Not on file   Stress: Not on file   Social Connections: Feeling Somewhat Isolated (2023)    OASIS : Social Isolation     Frequency of experiencing loneliness or isolation: Sometimes   Intimate Partner Violence: Not on file   Housing Stability: Not on file       Review of Systems  Pertinent items are noted in HPI.    Objective             Lab Review  Lab Results   Component Value Date    WBC 7.7 2024    RBC 4.98 2024    HGB 15.8 2024    HCT 46.9 2024     2024      Lab Results   Component Value Date    BUN 12 2024    CREATININE 0.88 2024      Lab Results   Component Value Date    PSA 0.45 2023       Assessment/Plan   Diagnoses and all orders for this visit:  BPH with obstruction/lower urinary tract symptoms  Urinary urgency        BPH s/p HoLEP 2023   BNC s/p bladder neck incision on 10/19/2023 and repeat BNI on 24 with Dr. Davison    Patient reports  complete resolution of all bothersome urinary symptoms.      He will hold off on Gemtesa to see if his bothersome symptoms return.     Advised him to restart Gemtesa if his symptoms reoccur. Patient understands.       All questions were answered to the patient's satisfaction. Patient agrees with the plan and wishes to proceed. Follow-up will be scheduled appropriately.     E&M visit today is associated with current or anticipated ongoing medical care services related to a patient's single, serious condition or a complex condition.    I spent 20 minutes of dedicated E&M time, including preparation and review of records, notes, and data, time spent with patient/family, and documentation.     Scribed for Dr. Leon by Dena Benitez. I , Dr Leon, have personally reviewed and agreed with the information entered by the Virtual Scribe.

## 2025-03-03 ENCOUNTER — APPOINTMENT (OUTPATIENT)
Dept: UROLOGY | Facility: CLINIC | Age: 70
End: 2025-03-03
Payer: MEDICARE

## 2025-03-03 DIAGNOSIS — N13.8 BPH WITH OBSTRUCTION/LOWER URINARY TRACT SYMPTOMS: ICD-10-CM

## 2025-03-03 DIAGNOSIS — N40.1 BPH WITH OBSTRUCTION/LOWER URINARY TRACT SYMPTOMS: ICD-10-CM

## 2025-03-03 DIAGNOSIS — R39.15 URINARY URGENCY: ICD-10-CM

## 2025-03-03 PROCEDURE — 1157F ADVNC CARE PLAN IN RCRD: CPT | Performed by: UROLOGY

## 2025-03-03 PROCEDURE — G2211 COMPLEX E/M VISIT ADD ON: HCPCS | Performed by: UROLOGY

## 2025-03-03 PROCEDURE — 99213 OFFICE O/P EST LOW 20 MIN: CPT | Performed by: UROLOGY

## 2025-03-17 NOTE — DISCHARGE INSTRUCTIONS
What To Do After Your Cystoscopy    Remove your catheter tomorrow    Drink at least 8 (8-ounce) cups of fluids, such as water, every day for the next few days. The liquids will help flush your bladder. This is important to help lower the amount of bleeding you may have. It also helps prevent infection.    You can limit how much you drink after 8 p.m. to avoid trips to the bathroom during the night.    You can go back to doing your usual activities after your cystoscopy. When there’s no blood in your urine, you can go back to having sexual activity.    If you’re feeling pain, ask your healthcare provider if it’s safe to take medication, such as acetaminophen (Tylenol). If you keep feeling pain after taking pain medication, call your healthcare provider.    Read the labels on all the medications you’re taking. This is very important if you’re taking acetaminophen. Acetaminophen is an ingredient in many over the counter and prescription medications. Taking too much can harm your liver. Do not take more than one medication that has acetaminophen without talking with a member of your care team.     Treatment Goal Explanation (Does Not Render In The Note): Stable for the purposes of categorizing medical decision making is defined by the specific treatment goals for an individual patient. A patient that is not at their treatment goal is not stable, even if the condition has not changed and there is no short- term threat to life or function.

## 2025-03-20 DIAGNOSIS — N52.9 ERECTILE DYSFUNCTION, UNSPECIFIED ERECTILE DYSFUNCTION TYPE: ICD-10-CM

## 2025-03-21 RX ORDER — TADALAFIL 20 MG/1
20 TABLET ORAL DAILY PRN
Qty: 30 TABLET | Refills: 8 | Status: SHIPPED | OUTPATIENT
Start: 2025-03-21 | End: 2026-03-21

## 2025-05-29 ENCOUNTER — PATIENT MESSAGE (OUTPATIENT)
Dept: PRIMARY CARE | Facility: CLINIC | Age: 70
End: 2025-05-29
Payer: MEDICARE

## 2025-05-29 DIAGNOSIS — Z13.29 SCREENING FOR ENDOCRINE, METABOLIC AND IMMUNITY DISORDER: ICD-10-CM

## 2025-05-29 DIAGNOSIS — Z13.228 SCREENING FOR ENDOCRINE, METABOLIC AND IMMUNITY DISORDER: ICD-10-CM

## 2025-05-29 DIAGNOSIS — Z13.0 SCREENING FOR ENDOCRINE, METABOLIC AND IMMUNITY DISORDER: ICD-10-CM

## 2025-05-29 DIAGNOSIS — R79.9 ABNORMAL BLOOD CHEMISTRY: Primary | ICD-10-CM

## 2025-05-29 DIAGNOSIS — E78.00 ELEVATED LDL CHOLESTEROL LEVEL: ICD-10-CM

## 2025-05-31 LAB
ANION GAP SERPL CALCULATED.4IONS-SCNC: 7 MMOL/L (CALC) (ref 7–17)
BUN SERPL-MCNC: 17 MG/DL (ref 7–25)
BUN/CREAT SERPL: ABNORMAL (CALC) (ref 6–22)
CALCIUM SERPL-MCNC: 9.2 MG/DL (ref 8.6–10.3)
CHLORIDE SERPL-SCNC: 108 MMOL/L (ref 98–110)
CO2 SERPL-SCNC: 27 MMOL/L (ref 20–32)
CREAT SERPL-MCNC: 1.02 MG/DL (ref 0.7–1.35)
EGFRCR SERPLBLD CKD-EPI 2021: 81 ML/MIN/1.73M2
EST. AVERAGE GLUCOSE BLD GHB EST-MCNC: 108 MG/DL
EST. AVERAGE GLUCOSE BLD GHB EST-SCNC: 6 MMOL/L
GLUCOSE SERPL-MCNC: 107 MG/DL (ref 65–99)
HBA1C MFR BLD: 5.4 %
POTASSIUM SERPL-SCNC: 5.1 MMOL/L (ref 3.5–5.3)
SODIUM SERPL-SCNC: 142 MMOL/L (ref 135–146)

## 2025-06-02 ENCOUNTER — APPOINTMENT (OUTPATIENT)
Dept: PRIMARY CARE | Facility: CLINIC | Age: 70
End: 2025-06-02
Payer: MEDICARE

## 2025-06-02 VITALS — WEIGHT: 198 LBS | DIASTOLIC BLOOD PRESSURE: 76 MMHG | BODY MASS INDEX: 27.62 KG/M2 | SYSTOLIC BLOOD PRESSURE: 128 MMHG

## 2025-06-02 DIAGNOSIS — F43.29 STRESS AND ADJUSTMENT REACTION: ICD-10-CM

## 2025-06-02 DIAGNOSIS — E78.00 ELEVATED LDL CHOLESTEROL LEVEL: Primary | ICD-10-CM

## 2025-06-02 DIAGNOSIS — G47.9 SLEEP DISTURBANCE: ICD-10-CM

## 2025-06-02 PROBLEM — E11.9 TYPE 2 DIABETES MELLITUS WITHOUT COMPLICATION, UNSPECIFIED WHETHER LONG TERM INSULIN USE: Status: RESOLVED | Noted: 2024-10-04 | Resolved: 2025-06-02

## 2025-06-02 PROBLEM — G35 MULTIPLE SCLEROSIS (MULTI): Status: RESOLVED | Noted: 2024-10-04 | Resolved: 2025-06-02

## 2025-06-02 PROCEDURE — 1159F MED LIST DOCD IN RCRD: CPT | Performed by: INTERNAL MEDICINE

## 2025-06-02 PROCEDURE — 99214 OFFICE O/P EST MOD 30 MIN: CPT | Performed by: INTERNAL MEDICINE

## 2025-06-02 RX ORDER — TRAZODONE HYDROCHLORIDE 100 MG/1
100 TABLET ORAL NIGHTLY
Qty: 90 TABLET | Refills: 1 | Status: SHIPPED | OUTPATIENT
Start: 2025-06-02

## 2025-06-02 RX ORDER — BUPROPION HYDROCHLORIDE 100 MG/1
100 TABLET, EXTENDED RELEASE ORAL 2 TIMES DAILY
Qty: 180 TABLET | Refills: 1 | Status: SHIPPED | OUTPATIENT
Start: 2025-06-02

## 2025-06-02 ASSESSMENT — PATIENT HEALTH QUESTIONNAIRE - PHQ9
1. LITTLE INTEREST OR PLEASURE IN DOING THINGS: NOT AT ALL
2. FEELING DOWN, DEPRESSED OR HOPELESS: NOT AT ALL
SUM OF ALL RESPONSES TO PHQ9 QUESTIONS 1 AND 2: 0

## 2025-06-02 NOTE — PATIENT INSTRUCTIONS
Keep a food diary to check what type , including drinks , spices?in relation to the occurrence of the stomach upset   If still with symptoms despite trials off these certain foods or drinks then take famotidine 20 mg a day if still not better come in for a follow-up visit     Look up Align or Culturelle for probiotcs and comparative generic brands

## 2025-06-02 NOTE — PROGRESS NOTES
Subjective   Patient ID: Mohit Villatoro is a 69 y.o. male who presents for Follow-up (6 month fuv).    HPI  Patient in for a visit  Glucose is good, not prediabetic   Still with occl burping stomach upset with certain foods even water can make   Review of Systems  General: Denies fever, chills, night sweats,  Eyes: Negative for recent visual changes  Ears, Nose, Throat :  Negative for hearing changes, sinus discomfort  Dermatologic: Negative for new skin conditions, rash  Respiratory: Negative for wheezing, shortness of breath, cough  Cardiovascular: Negative for chest pain, palpitations, or leg swelling  Gastrointestinal: Negative for nausea/vomiting, abdominal pain, changes in bowel habits  Genitourinary Negative for Urinary Incontinence  urgency , frequency, discomfort   Musculoskeletal: see hpi  Neurological: Negative for headaches, dizziness    Previous history  Medical History[1]  Surgical History[2]  Social History[3]  Family History[4]  Allergies[5]  Current Outpatient Medications   Medication Instructions    benzoyl peroxide 5 % external wash 1 Application, Topical, Daily, Dispense 240 mL    buPROPion SR (WELLBUTRIN SR) 100 mg, oral, 2 times daily, Do not crush, chew, or split.    clindamycin (Cleocin T) 1 % lotion Topical, Daily, 60g    tadalafil (CIALIS) 20 mg, oral, Daily PRN    traZODone (DESYREL) 100 mg, oral, Nightly       Objective       Physical Exam  Vital Signs: as recorded above  General: Well groomed, well nourished   Orientation:  Alert , oriented to time, place , and person   Mood and Affect:  Cooperative , no apparent distress normal affect  Skin: Good color, good turgor  Eyes: Extra ocular muscle movements intact, anicteric sclerae  Neck: Supple, full range of movement  Chest: Normal breath sounds, normal chest wall exam, symmetric, good air entry, clear to auscultation  Heart: Regular rate and rhythm, without murmur, gallop, or rubs  Abdomen soft nontender no masses felt no  hepatosplenomegaly, no rebound or guarding  BACK:  no CTLS spine tenderness, no flank tenderness  Extremities: full range of movement  bilateral UE and bilateral LE,  no lower extremity edema  Neurological: Alert, oriented, cranial nerves II-XII grossly intact except for visual acuity  Sensation:  Intact   Gait: normal steady      Assessment/Plan   Mohit Villatoro is a 69 y.o. male who presents for the concerns below:    Problem List Items Addressed This Visit    None    Not prediabetic anymore will also check lipid panel next time LDL goal     STRESS MANAGEMENT:PLAN:  Patient symptoms stable with current medication, will update refills.  No adverse side effects. Follow-up visit in 4-6 months , sooner in 2 months if we are revising dosage. Rest/sleep hygiene, exercise for stress relief  No thoughts of hurting self or others. Follow-up as planned.    STOMACH discomfort  Plan:  keep food diary trial off foods that cause problems  diet modification weight control reflux precautions  , if any change in pattern will consider gastroenterology consult. Patient follow-up as discussed.    MS and DM entered in error pt never had diagnosis for MS or DM   The latter only prediabetes        Discussed with:   Return in : 3 months     Portions of this note were generated using digital voice recognition software, and may contain grammatical errors       Valentin Vazquez MD  06/02/25  8:42 AM       [1]   Past Medical History:  Diagnosis Date    Abnormal finding of blood chemistry, unspecified 11/30/2022    Abnormal blood chemistry    Adjustment disorder, unspecified 12/17/2021    Adult situational stress disorder    Allergic rhinitis, unspecified 09/01/2017    Allergic rhinitis    Benign prostatic hyperplasia without lower urinary tract symptoms 12/16/2022    BPH (benign prostatic hyperplasia)    Cataract     Encounter for general adult medical examination without abnormal findings 11/30/2022    Welcome to Medicare  preventive visit    Encounter for screening for malignant neoplasm of prostate 10/12/2022    Screening for prostate cancer    Nocturia 2017    Nocturia    Obstructive sleep apnea syndrome 10/04/2023    Other abnormality of red blood cells 10/12/2022    Elevated MCV    Pain in right knee 2023    Bilateral knee pain    Poor urinary stream 2022    Weak urine stream    Pure hypercholesterolemia, unspecified 10/12/2022    Elevated LDL cholesterol level    Sleep disorder, unspecified 2021    Sleep disturbance    Type 2 diabetes mellitus without complication, unspecified whether long term insulin use 10/4/2024    Unspecified abnormal findings in urine 09/10/2020    Urine abnormality    Unspecified skin changes 2021    Skin change   [2]   Past Surgical History:  Procedure Laterality Date    LASER OF PROSTATE W/ GREEN LIGHT PVP      Holmium laser of prostate(not green light)    OTHER SURGICAL HISTORY  2018    Hernia repair   [3]   Social History  Tobacco Use    Smoking status: Former     Current packs/day: 0.00     Average packs/day: 0.4 packs/day for 30.0 years (12.5 ttl pk-yrs)     Types: Cigarettes     Start date: 1977     Quit date: 1987     Years since quittin.7     Passive exposure: Never    Smokeless tobacco: Never    Tobacco comments:     Casual smoker   Vaping Use    Vaping status: Never Used   Substance Use Topics    Alcohol use: Yes     Alcohol/week: 4.0 standard drinks of alcohol     Types: 4 Glasses of wine per week     Comment: occasionally 1-4    Drug use: Not Currently   [4]   Family History  Problem Relation Name Age of Onset    Hypertension Mother      Heart failure Mother      Emphysema Mother      Diabetes Sister      Hashimoto's thyroiditis Sister      No Known Problems Son Jacob     No Known Problems Son Jamil     No Known Problems Son Lanre     Other (cardiac disorder) Other      Other (allergy) Other      Hypertension Other     [5]   Allergies  Allergen  Reactions    Fluoxetine Hcl Unknown     sex dysfunction    House Dust Mite Cough

## 2025-07-07 ENCOUNTER — APPOINTMENT (OUTPATIENT)
Dept: UROLOGY | Facility: CLINIC | Age: 70
End: 2025-07-07
Payer: MEDICARE

## 2025-08-29 ENCOUNTER — TELEPHONE (OUTPATIENT)
Dept: SCHEDULING | Age: 70
End: 2025-08-29
Payer: MEDICARE

## 2025-09-09 ENCOUNTER — APPOINTMENT (OUTPATIENT)
Dept: PRIMARY CARE | Facility: CLINIC | Age: 70
End: 2025-09-09
Payer: MEDICARE

## 2026-03-03 ENCOUNTER — APPOINTMENT (OUTPATIENT)
Dept: UROLOGY | Facility: CLINIC | Age: 71
End: 2026-03-03
Payer: MEDICARE

## (undated) DEVICE — SUTURE, VICRYL, 0, 18 IN, CT-1, UNDYED

## (undated) DEVICE — Device

## (undated) DEVICE — SOLUTION, INJECTION, 0.9% SODIUM CHL, USP LIFECARE 1000 MI

## (undated) DEVICE — BAG, DRAINAGE, CONTINUOUS IRRIGATION, 4L

## (undated) DEVICE — STRIP, SKIN CLOSURE, STERI STRIP, REINFORCED, 0.5 X 4 IN

## (undated) DEVICE — SYRINGE, 60 CC, IRRIGATION, BULB, CONTRO-BULB, PAPER POUCH

## (undated) DEVICE — DRAPE, SHEET, U, W/ADHESIVE STRIP, IMPERVIOUS, 60 X 70 IN, DISPOSABLE, LF, STERILE

## (undated) DEVICE — SUTURE, CTD, VICRYL, 2-0, UND, BR, CT-2

## (undated) DEVICE — GLOVE, SURGICAL, PROTEXIS PI , 6.5, PF, LF

## (undated) DEVICE — GLOVE, SURGICAL, PROTEXIS PI BLUE W/NEUTHERA, 7.0, PF, LF

## (undated) DEVICE — GOWN, SURGICAL, SIRUS, NON REINFORCED, LARGE

## (undated) DEVICE — NEEDLE, SPINAL, QUINCKE, 18 G X 3.5 IN, PINK HUB

## (undated) DEVICE — SUTURE, MONOCRYL, 3-0, 27 IN, PS-2, UNDYED

## (undated) DEVICE — SUTURE, ETHIBOND, P2, V-37, 30 IN, GREEN

## (undated) DEVICE — BANDAGE, COFLEX, 6 X 5 YDS, TAN, STERILE, LF

## (undated) DEVICE — DRAIN, WOUND, ROUND, W/TROCAR, HOLE PATTERN, 10 IN, MEDIUM/LARGE, 3/16 X 49 IN

## (undated) DEVICE — LOOP, BIPOLAR CUTTING, 24/26 FR, F/PROSTATE, 0.40MM, STERILE

## (undated) DEVICE — TIP, SUCTION, YANKAUER, FLEXIBLE

## (undated) DEVICE — GLOVE, SURGICAL, BIOGEL, OPTIFIT, SZ 8.0

## (undated) DEVICE — TUBING, IRRIGATION, HIGH FLOW, HAND PIECE SET

## (undated) DEVICE — HOOD, SURGICAL, FLYTE HYBRID

## (undated) DEVICE — CATHETER, URETHRAL, FOLEY, 2 WAY, BARDEX LUBRICATH, MEDIUM LENGTH, 18 FR, 30 CC, LATEX

## (undated) DEVICE — BLANKET, LOWER BODY, VHA PLUS, ADULT

## (undated) DEVICE — DRAPE, IRRIGATION, W/POUCH, ADHESIVE STRIP, STERI DRAPE, 19 X 23 IN, DISPOSABLE, STERILE

## (undated) DEVICE — IRRIGATION SET, CYSTOSCOPY, TURP, Y, CONTINUOUS, 81 IN

## (undated) DEVICE — UROVAC BLADDER EVACUATOR

## (undated) DEVICE — PREP TRAY, BASIC

## (undated) DEVICE — GLOVE, SURGICAL, PROTEXIS PI , 7.5, PF, LF

## (undated) DEVICE — LOOP, BIPOLAR CUTTING, 24/26 FR, F/URO, 0.35MM, STERILE

## (undated) DEVICE — CHANNEL DRAIN, BARD, 15FR, ROUND HUBLESS, FULL FLUTED

## (undated) DEVICE — SYRINGE, 50 CC, LUER LOCK

## (undated) DEVICE — CEMENT, MIXEVAC III, 10S BOWL, KNEES

## (undated) DEVICE — DRAPE, INCISE, ANTIMICROBIAL, IOBAN 2, STERI DRAPE, 23 X 33 IN, DISPOSABLE, STERILE

## (undated) DEVICE — DRAPE, ISOLATION, INCISE, W/POUCH, STERI DRAPE, 125 X 83 IN, DISPOSABLE, STERILE

## (undated) DEVICE — CUFF, TOURNIQUET, 30 X 4, DUAL PORT/SNGL BLADDER, DISP, LF

## (undated) DEVICE — SOLUTION, INJECTION, USP, LACTATED RINGERS, LIFECARE, 1000ML